# Patient Record
Sex: MALE | Race: ASIAN | NOT HISPANIC OR LATINO
[De-identification: names, ages, dates, MRNs, and addresses within clinical notes are randomized per-mention and may not be internally consistent; named-entity substitution may affect disease eponyms.]

---

## 2018-03-16 ENCOUNTER — TRANSCRIPTION ENCOUNTER (OUTPATIENT)
Age: 68
End: 2018-03-16

## 2018-04-13 ENCOUNTER — APPOINTMENT (OUTPATIENT)
Dept: RADIOLOGY | Facility: IMAGING CENTER | Age: 68
End: 2018-04-13
Payer: MEDICARE

## 2018-04-13 ENCOUNTER — OUTPATIENT (OUTPATIENT)
Dept: OUTPATIENT SERVICES | Facility: HOSPITAL | Age: 68
LOS: 1 days | End: 2018-04-13
Payer: MEDICARE

## 2018-04-13 DIAGNOSIS — Z00.8 ENCOUNTER FOR OTHER GENERAL EXAMINATION: ICD-10-CM

## 2018-04-13 PROCEDURE — 71046 X-RAY EXAM CHEST 2 VIEWS: CPT | Mod: 26

## 2018-04-13 PROCEDURE — 71046 X-RAY EXAM CHEST 2 VIEWS: CPT

## 2018-08-23 ENCOUNTER — APPOINTMENT (OUTPATIENT)
Dept: ORTHOPEDIC SURGERY | Facility: CLINIC | Age: 68
End: 2018-08-23
Payer: MEDICARE

## 2018-08-23 DIAGNOSIS — Z78.9 OTHER SPECIFIED HEALTH STATUS: ICD-10-CM

## 2018-08-23 DIAGNOSIS — S80.12XA CONTUSION OF LEFT LOWER LEG, INITIAL ENCOUNTER: ICD-10-CM

## 2018-08-23 PROCEDURE — 99214 OFFICE O/P EST MOD 30 MIN: CPT

## 2018-08-23 PROCEDURE — 73562 X-RAY EXAM OF KNEE 3: CPT | Mod: LT

## 2018-08-23 PROCEDURE — 73560 X-RAY EXAM OF KNEE 1 OR 2: CPT | Mod: RT

## 2018-10-08 ENCOUNTER — OUTPATIENT (OUTPATIENT)
Dept: OUTPATIENT SERVICES | Facility: HOSPITAL | Age: 68
LOS: 1 days | End: 2018-10-08
Payer: MEDICARE

## 2018-10-08 VITALS
OXYGEN SATURATION: 98 % | HEIGHT: 62 IN | DIASTOLIC BLOOD PRESSURE: 84 MMHG | TEMPERATURE: 98 F | WEIGHT: 180.78 LBS | SYSTOLIC BLOOD PRESSURE: 140 MMHG | RESPIRATION RATE: 16 BRPM | HEART RATE: 78 BPM

## 2018-10-08 DIAGNOSIS — M25.562 PAIN IN LEFT KNEE: ICD-10-CM

## 2018-10-08 DIAGNOSIS — Z96.652 PRESENCE OF LEFT ARTIFICIAL KNEE JOINT: Chronic | ICD-10-CM

## 2018-10-08 DIAGNOSIS — M17.12 UNILATERAL PRIMARY OSTEOARTHRITIS, LEFT KNEE: ICD-10-CM

## 2018-10-08 DIAGNOSIS — Z01.818 ENCOUNTER FOR OTHER PREPROCEDURAL EXAMINATION: ICD-10-CM

## 2018-10-08 LAB
ALBUMIN SERPL ELPH-MCNC: 4.4 G/DL — SIGNIFICANT CHANGE UP (ref 3.3–5)
ALP SERPL-CCNC: 69 U/L — SIGNIFICANT CHANGE UP (ref 30–120)
ALT FLD-CCNC: 22 U/L DA — SIGNIFICANT CHANGE UP (ref 10–60)
ANION GAP SERPL CALC-SCNC: 2 MMOL/L — LOW (ref 5–17)
APTT BLD: 34.7 SEC — SIGNIFICANT CHANGE UP (ref 27.5–37.4)
AST SERPL-CCNC: 17 U/L — SIGNIFICANT CHANGE UP (ref 10–40)
BILIRUB SERPL-MCNC: 0.4 MG/DL — SIGNIFICANT CHANGE UP (ref 0.2–1.2)
BLD GP AB SCN SERPL QL: SIGNIFICANT CHANGE UP
BUN SERPL-MCNC: 18 MG/DL — SIGNIFICANT CHANGE UP (ref 7–23)
CALCIUM SERPL-MCNC: 10.1 MG/DL — SIGNIFICANT CHANGE UP (ref 8.4–10.5)
CHLORIDE SERPL-SCNC: 103 MMOL/L — SIGNIFICANT CHANGE UP (ref 96–108)
CO2 SERPL-SCNC: 34 MMOL/L — HIGH (ref 22–31)
CREAT SERPL-MCNC: 1.02 MG/DL — SIGNIFICANT CHANGE UP (ref 0.5–1.3)
GLUCOSE SERPL-MCNC: 122 MG/DL — HIGH (ref 70–99)
HCT VFR BLD CALC: 46.1 % — SIGNIFICANT CHANGE UP (ref 39–50)
HGB BLD-MCNC: 15 G/DL — SIGNIFICANT CHANGE UP (ref 13–17)
INR BLD: 0.97 RATIO — SIGNIFICANT CHANGE UP (ref 0.88–1.16)
MCHC RBC-ENTMCNC: 27.3 PG — SIGNIFICANT CHANGE UP (ref 27–34)
MCHC RBC-ENTMCNC: 32.5 GM/DL — SIGNIFICANT CHANGE UP (ref 32–36)
MCV RBC AUTO: 84 FL — SIGNIFICANT CHANGE UP (ref 80–100)
MRSA PCR RESULT.: SIGNIFICANT CHANGE UP
NRBC # BLD: 0 /100 WBCS — SIGNIFICANT CHANGE UP (ref 0–0)
PLATELET # BLD AUTO: 334 K/UL — SIGNIFICANT CHANGE UP (ref 150–400)
POTASSIUM SERPL-MCNC: 4.3 MMOL/L — SIGNIFICANT CHANGE UP (ref 3.5–5.3)
POTASSIUM SERPL-SCNC: 4.3 MMOL/L — SIGNIFICANT CHANGE UP (ref 3.5–5.3)
PROT SERPL-MCNC: 7.9 G/DL — SIGNIFICANT CHANGE UP (ref 6–8.3)
PROTHROM AB SERPL-ACNC: 10.6 SEC — SIGNIFICANT CHANGE UP (ref 9.8–12.7)
RBC # BLD: 5.49 M/UL — SIGNIFICANT CHANGE UP (ref 4.2–5.8)
RBC # FLD: 13.9 % — SIGNIFICANT CHANGE UP (ref 10.3–14.5)
S AUREUS DNA NOSE QL NAA+PROBE: SIGNIFICANT CHANGE UP
SODIUM SERPL-SCNC: 139 MMOL/L — SIGNIFICANT CHANGE UP (ref 135–145)
WBC # BLD: 6.1 K/UL — SIGNIFICANT CHANGE UP (ref 3.8–10.5)
WBC # FLD AUTO: 6.1 K/UL — SIGNIFICANT CHANGE UP (ref 3.8–10.5)

## 2018-10-08 PROCEDURE — 80053 COMPREHEN METABOLIC PANEL: CPT

## 2018-10-08 PROCEDURE — 93005 ELECTROCARDIOGRAM TRACING: CPT

## 2018-10-08 PROCEDURE — 85730 THROMBOPLASTIN TIME PARTIAL: CPT

## 2018-10-08 PROCEDURE — 86900 BLOOD TYPING SEROLOGIC ABO: CPT

## 2018-10-08 PROCEDURE — 93010 ELECTROCARDIOGRAM REPORT: CPT

## 2018-10-08 PROCEDURE — 87640 STAPH A DNA AMP PROBE: CPT

## 2018-10-08 PROCEDURE — 86850 RBC ANTIBODY SCREEN: CPT

## 2018-10-08 PROCEDURE — G0463: CPT

## 2018-10-08 PROCEDURE — 85027 COMPLETE CBC AUTOMATED: CPT

## 2018-10-08 PROCEDURE — 85610 PROTHROMBIN TIME: CPT

## 2018-10-08 PROCEDURE — 86901 BLOOD TYPING SEROLOGIC RH(D): CPT

## 2018-10-08 PROCEDURE — 36415 COLL VENOUS BLD VENIPUNCTURE: CPT

## 2018-10-08 NOTE — H&P PST ADULT - PMH
BPH (benign prostatic hyperplasia)    Hyperlipidemia    Hypothyroid    Sleep apnea  nasal mask- BPH (benign prostatic hyperplasia)    Chronic pain of left knee    Hyperlipidemia    Hypothyroid    Sleep apnea  nasal mask-

## 2018-10-08 NOTE — H&P PST ADULT - ASSESSMENT
Patient presents to PST.  Pre op instructions reviewed and understood.  Medical clearance apt to be scheduled.

## 2018-10-08 NOTE — H&P PST ADULT - HISTORY OF PRESENT ILLNESS
66 yo male presents with 68 yo male presents with over 1 year history of left knee pain.  Pain increases with extension and ambulalation.  Mild relief with aleve. 66 yo male presents with over 1 year history of left knee pain.  Pain increases with extension and ambulalation.  Mild relief with aleve, which patient will stop 1 week pre op. 66 yo male presents with over 1 year history of left knee pain.  Pain increases with extension and ambulation.  Mild relief with aleve, which patient will stop 1 week pre op.

## 2018-10-08 NOTE — H&P PST ADULT - FAMILY HISTORY
Father  Still living? No  Family history of heart attack, Age at diagnosis: Age Unknown  Family history of stroke, Age at diagnosis: Age Unknown     Mother  Still living? Yes, Estimated age: 81-90  Family history of osteoarthritis, Age at diagnosis: Age Unknown

## 2018-10-11 ENCOUNTER — APPOINTMENT (OUTPATIENT)
Dept: ORTHOPEDIC SURGERY | Facility: CLINIC | Age: 68
End: 2018-10-11
Payer: MEDICARE

## 2018-10-11 VITALS — WEIGHT: 162 LBS | HEIGHT: 63 IN | BODY MASS INDEX: 28.7 KG/M2

## 2018-10-11 PROCEDURE — 99214 OFFICE O/P EST MOD 30 MIN: CPT

## 2018-10-11 PROCEDURE — 73562 X-RAY EXAM OF KNEE 3: CPT | Mod: LT

## 2018-10-19 RX ORDER — MAGNESIUM HYDROXIDE 400 MG/1
30 TABLET, CHEWABLE ORAL DAILY
Qty: 0 | Refills: 0 | Status: DISCONTINUED | OUTPATIENT
Start: 2018-10-23 | End: 2018-10-26

## 2018-10-19 RX ORDER — POLYETHYLENE GLYCOL 3350 17 G/17G
17 POWDER, FOR SOLUTION ORAL DAILY
Qty: 0 | Refills: 0 | Status: DISCONTINUED | OUTPATIENT
Start: 2018-10-23 | End: 2018-10-26

## 2018-10-19 RX ORDER — SENNA PLUS 8.6 MG/1
2 TABLET ORAL AT BEDTIME
Qty: 0 | Refills: 0 | Status: DISCONTINUED | OUTPATIENT
Start: 2018-10-23 | End: 2018-10-26

## 2018-10-19 RX ORDER — DOCUSATE SODIUM 100 MG
100 CAPSULE ORAL THREE TIMES A DAY
Qty: 0 | Refills: 0 | Status: DISCONTINUED | OUTPATIENT
Start: 2018-10-23 | End: 2018-10-26

## 2018-10-19 RX ORDER — SODIUM CHLORIDE 9 MG/ML
1000 INJECTION, SOLUTION INTRAVENOUS
Qty: 0 | Refills: 0 | Status: DISCONTINUED | OUTPATIENT
Start: 2018-10-23 | End: 2018-10-25

## 2018-10-19 RX ORDER — ONDANSETRON 8 MG/1
4 TABLET, FILM COATED ORAL EVERY 6 HOURS
Qty: 0 | Refills: 0 | Status: DISCONTINUED | OUTPATIENT
Start: 2018-10-23 | End: 2018-10-26

## 2018-10-19 RX ORDER — PANTOPRAZOLE SODIUM 20 MG/1
40 TABLET, DELAYED RELEASE ORAL
Qty: 0 | Refills: 0 | Status: DISCONTINUED | OUTPATIENT
Start: 2018-10-23 | End: 2018-10-26

## 2018-10-22 ENCOUNTER — TRANSCRIPTION ENCOUNTER (OUTPATIENT)
Age: 68
End: 2018-10-22

## 2018-10-23 ENCOUNTER — RESULT REVIEW (OUTPATIENT)
Age: 68
End: 2018-10-23

## 2018-10-23 ENCOUNTER — INPATIENT (INPATIENT)
Facility: HOSPITAL | Age: 68
LOS: 2 days | Discharge: HOME | DRG: 470 | End: 2018-10-26
Attending: SPECIALIST | Admitting: SPECIALIST
Payer: MEDICARE

## 2018-10-23 ENCOUNTER — APPOINTMENT (OUTPATIENT)
Dept: ORTHOPEDIC SURGERY | Facility: HOSPITAL | Age: 68
End: 2018-10-23

## 2018-10-23 VITALS
TEMPERATURE: 98 F | HEART RATE: 84 BPM | DIASTOLIC BLOOD PRESSURE: 85 MMHG | RESPIRATION RATE: 21 BRPM | HEIGHT: 63 IN | OXYGEN SATURATION: 100 % | SYSTOLIC BLOOD PRESSURE: 151 MMHG | WEIGHT: 174.39 LBS

## 2018-10-23 DIAGNOSIS — N40.0 BENIGN PROSTATIC HYPERPLASIA WITHOUT LOWER URINARY TRACT SYMPTOMS: ICD-10-CM

## 2018-10-23 DIAGNOSIS — E03.9 HYPOTHYROIDISM, UNSPECIFIED: ICD-10-CM

## 2018-10-23 DIAGNOSIS — M17.12 UNILATERAL PRIMARY OSTEOARTHRITIS, LEFT KNEE: ICD-10-CM

## 2018-10-23 DIAGNOSIS — Z01.818 ENCOUNTER FOR OTHER PREPROCEDURAL EXAMINATION: ICD-10-CM

## 2018-10-23 DIAGNOSIS — Z96.652 PRESENCE OF LEFT ARTIFICIAL KNEE JOINT: Chronic | ICD-10-CM

## 2018-10-23 DIAGNOSIS — E78.5 HYPERLIPIDEMIA, UNSPECIFIED: ICD-10-CM

## 2018-10-23 DIAGNOSIS — G47.30 SLEEP APNEA, UNSPECIFIED: ICD-10-CM

## 2018-10-23 DIAGNOSIS — M25.562 PAIN IN LEFT KNEE: ICD-10-CM

## 2018-10-23 LAB
ANION GAP SERPL CALC-SCNC: 6 MMOL/L — SIGNIFICANT CHANGE UP (ref 5–17)
BUN SERPL-MCNC: 15 MG/DL — SIGNIFICANT CHANGE UP (ref 7–23)
CALCIUM SERPL-MCNC: 8.7 MG/DL — SIGNIFICANT CHANGE UP (ref 8.4–10.5)
CHLORIDE SERPL-SCNC: 101 MMOL/L — SIGNIFICANT CHANGE UP (ref 96–108)
CO2 SERPL-SCNC: 27 MMOL/L — SIGNIFICANT CHANGE UP (ref 22–31)
CREAT SERPL-MCNC: 1.2 MG/DL — SIGNIFICANT CHANGE UP (ref 0.5–1.3)
GLUCOSE SERPL-MCNC: 217 MG/DL — HIGH (ref 70–99)
HCT VFR BLD CALC: 42.8 % — SIGNIFICANT CHANGE UP (ref 39–50)
HGB BLD-MCNC: 13.9 G/DL — SIGNIFICANT CHANGE UP (ref 13–17)
POTASSIUM SERPL-MCNC: 4.2 MMOL/L — SIGNIFICANT CHANGE UP (ref 3.5–5.3)
POTASSIUM SERPL-SCNC: 4.2 MMOL/L — SIGNIFICANT CHANGE UP (ref 3.5–5.3)
SODIUM SERPL-SCNC: 134 MMOL/L — LOW (ref 135–145)

## 2018-10-23 PROCEDURE — 88305 TISSUE EXAM BY PATHOLOGIST: CPT | Mod: 26

## 2018-10-23 PROCEDURE — 99222 1ST HOSP IP/OBS MODERATE 55: CPT

## 2018-10-23 PROCEDURE — 88311 DECALCIFY TISSUE: CPT | Mod: 26

## 2018-10-23 PROCEDURE — 27447 TOTAL KNEE ARTHROPLASTY: CPT | Mod: AS,LT

## 2018-10-23 PROCEDURE — 27447 TOTAL KNEE ARTHROPLASTY: CPT | Mod: LT

## 2018-10-23 RX ORDER — TAMSULOSIN HYDROCHLORIDE 0.4 MG/1
0.4 CAPSULE ORAL AT BEDTIME
Qty: 0 | Refills: 0 | Status: DISCONTINUED | OUTPATIENT
Start: 2018-10-23 | End: 2018-10-26

## 2018-10-23 RX ORDER — OXYCODONE HYDROCHLORIDE 5 MG/1
10 TABLET ORAL
Qty: 0 | Refills: 0 | Status: DISCONTINUED | OUTPATIENT
Start: 2018-10-23 | End: 2018-10-26

## 2018-10-23 RX ORDER — SODIUM CHLORIDE 9 MG/ML
1000 INJECTION, SOLUTION INTRAVENOUS
Qty: 0 | Refills: 0 | Status: DISCONTINUED | OUTPATIENT
Start: 2018-10-23 | End: 2018-10-23

## 2018-10-23 RX ORDER — APREPITANT 80 MG/1
40 CAPSULE ORAL ONCE
Qty: 0 | Refills: 0 | Status: COMPLETED | OUTPATIENT
Start: 2018-10-23 | End: 2018-10-23

## 2018-10-23 RX ORDER — CHLORHEXIDINE GLUCONATE 213 G/1000ML
1 SOLUTION TOPICAL ONCE
Qty: 0 | Refills: 0 | Status: COMPLETED | OUTPATIENT
Start: 2018-10-23 | End: 2018-10-23

## 2018-10-23 RX ORDER — ACETAMINOPHEN 500 MG
1000 TABLET ORAL ONCE
Qty: 0 | Refills: 0 | Status: COMPLETED | OUTPATIENT
Start: 2018-10-23 | End: 2018-10-23

## 2018-10-23 RX ORDER — ONDANSETRON 8 MG/1
4 TABLET, FILM COATED ORAL ONCE
Qty: 0 | Refills: 0 | Status: DISCONTINUED | OUTPATIENT
Start: 2018-10-23 | End: 2018-10-23

## 2018-10-23 RX ORDER — CEFAZOLIN SODIUM 1 G
2000 VIAL (EA) INJECTION EVERY 8 HOURS
Qty: 0 | Refills: 0 | Status: COMPLETED | OUTPATIENT
Start: 2018-10-23 | End: 2018-10-24

## 2018-10-23 RX ORDER — OXYCODONE HYDROCHLORIDE 5 MG/1
5 TABLET ORAL
Qty: 0 | Refills: 0 | Status: DISCONTINUED | OUTPATIENT
Start: 2018-10-23 | End: 2018-10-26

## 2018-10-23 RX ORDER — HYDROMORPHONE HYDROCHLORIDE 2 MG/ML
0.5 INJECTION INTRAMUSCULAR; INTRAVENOUS; SUBCUTANEOUS
Qty: 0 | Refills: 0 | Status: DISCONTINUED | OUTPATIENT
Start: 2018-10-23 | End: 2018-10-26

## 2018-10-23 RX ORDER — LEVOTHYROXINE SODIUM 125 MCG
50 TABLET ORAL DAILY
Qty: 0 | Refills: 0 | Status: DISCONTINUED | OUTPATIENT
Start: 2018-10-23 | End: 2018-10-26

## 2018-10-23 RX ORDER — SIMVASTATIN 20 MG/1
10 TABLET, FILM COATED ORAL AT BEDTIME
Qty: 0 | Refills: 0 | Status: DISCONTINUED | OUTPATIENT
Start: 2018-10-23 | End: 2018-10-26

## 2018-10-23 RX ORDER — CEFAZOLIN SODIUM 1 G
2000 VIAL (EA) INJECTION ONCE
Qty: 0 | Refills: 0 | Status: COMPLETED | OUTPATIENT
Start: 2018-10-23 | End: 2018-10-23

## 2018-10-23 RX ORDER — ASPIRIN/CALCIUM CARB/MAGNESIUM 324 MG
81 TABLET ORAL
Qty: 0 | Refills: 0 | Status: DISCONTINUED | OUTPATIENT
Start: 2018-10-24 | End: 2018-10-26

## 2018-10-23 RX ORDER — TRANEXAMIC ACID 100 MG/ML
1000 INJECTION, SOLUTION INTRAVENOUS ONCE
Qty: 0 | Refills: 0 | Status: COMPLETED | OUTPATIENT
Start: 2018-10-23 | End: 2018-10-23

## 2018-10-23 RX ORDER — ACETAMINOPHEN 500 MG
1000 TABLET ORAL EVERY 6 HOURS
Qty: 0 | Refills: 0 | Status: COMPLETED | OUTPATIENT
Start: 2018-10-23 | End: 2018-10-24

## 2018-10-23 RX ORDER — CELECOXIB 200 MG/1
200 CAPSULE ORAL
Qty: 0 | Refills: 0 | Status: DISCONTINUED | OUTPATIENT
Start: 2018-10-23 | End: 2018-10-26

## 2018-10-23 RX ORDER — INFLUENZA VIRUS VACCINE 15; 15; 15; 15 UG/.5ML; UG/.5ML; UG/.5ML; UG/.5ML
0.5 SUSPENSION INTRAMUSCULAR ONCE
Qty: 0 | Refills: 0 | Status: COMPLETED | OUTPATIENT
Start: 2018-10-23 | End: 2018-10-26

## 2018-10-23 RX ORDER — HYDROMORPHONE HYDROCHLORIDE 2 MG/ML
0.5 INJECTION INTRAMUSCULAR; INTRAVENOUS; SUBCUTANEOUS
Qty: 0 | Refills: 0 | Status: DISCONTINUED | OUTPATIENT
Start: 2018-10-23 | End: 2018-10-23

## 2018-10-23 RX ORDER — ACETAMINOPHEN 500 MG
1000 TABLET ORAL EVERY 8 HOURS
Qty: 0 | Refills: 0 | Status: DISCONTINUED | OUTPATIENT
Start: 2018-10-24 | End: 2018-10-26

## 2018-10-23 RX ADMIN — SODIUM CHLORIDE 75 MILLILITER(S): 9 INJECTION, SOLUTION INTRAVENOUS at 13:48

## 2018-10-23 RX ADMIN — APREPITANT 40 MILLIGRAM(S): 80 CAPSULE ORAL at 13:48

## 2018-10-23 RX ADMIN — HYDROMORPHONE HYDROCHLORIDE 0.5 MILLIGRAM(S): 2 INJECTION INTRAMUSCULAR; INTRAVENOUS; SUBCUTANEOUS at 18:10

## 2018-10-23 RX ADMIN — HYDROMORPHONE HYDROCHLORIDE 0.5 MILLIGRAM(S): 2 INJECTION INTRAMUSCULAR; INTRAVENOUS; SUBCUTANEOUS at 18:30

## 2018-10-23 RX ADMIN — HYDROMORPHONE HYDROCHLORIDE 0.5 MILLIGRAM(S): 2 INJECTION INTRAMUSCULAR; INTRAVENOUS; SUBCUTANEOUS at 17:55

## 2018-10-23 RX ADMIN — HYDROMORPHONE HYDROCHLORIDE 0.5 MILLIGRAM(S): 2 INJECTION INTRAMUSCULAR; INTRAVENOUS; SUBCUTANEOUS at 18:09

## 2018-10-23 RX ADMIN — Medication 1000 MILLIGRAM(S): at 23:03

## 2018-10-23 RX ADMIN — SODIUM CHLORIDE 100 MILLILITER(S): 9 INJECTION, SOLUTION INTRAVENOUS at 18:09

## 2018-10-23 RX ADMIN — HYDROMORPHONE HYDROCHLORIDE 0.5 MILLIGRAM(S): 2 INJECTION INTRAMUSCULAR; INTRAVENOUS; SUBCUTANEOUS at 19:45

## 2018-10-23 RX ADMIN — Medication 400 MILLIGRAM(S): at 22:59

## 2018-10-23 RX ADMIN — Medication 100 MILLIGRAM(S): at 22:59

## 2018-10-23 RX ADMIN — CHLORHEXIDINE GLUCONATE 1 APPLICATION(S): 213 SOLUTION TOPICAL at 13:48

## 2018-10-23 RX ADMIN — HYDROMORPHONE HYDROCHLORIDE 0.5 MILLIGRAM(S): 2 INJECTION INTRAMUSCULAR; INTRAVENOUS; SUBCUTANEOUS at 22:59

## 2018-10-23 NOTE — CONSULT NOTE ADULT - SUBJECTIVE AND OBJECTIVE BOX
Date/Time Patient Seen:  		  Referring MD:   Data Reviewed	       Patient is a 67y old  Male who presents with a chief complaint of pain left knee (23 Oct 2018 17:59)      Subjective/HPI    in bed  seen and examined  vs and meds reviewed  awake  verbal  medical records reviewed  uses CPAP for GILSON  post op today    H and P reviewed      H&P PST Adult [Charted Location: [facility]-Pre-Surgical Testing] [Authored: 08-Oct-2018 11:45]- for Visit: [id number], Complete, Revised, Signed in Full, General       General:  ·  Admission Date: 08-Oct-2018.   ·  Arrived From home.  ·  Source of Information patient.    Care Providers:   Provider Role	Provider Name	Occupation  · Attending	Quincy Brasher MD     Other Care Providers:  · Primary Care Provider	Dr Julian  [phone]    Chief Complaint/Reason for Visit/HPI:    Reason for Admission:  Reason for Admission	" My left knee is painful."     History of Present Illness:  History of Present Illness	  66 yo male presents with over 1 year history of left knee pain.  Pain increases with extension and ambulation.  Mild relief with aleve, which patient will stop 1 week pre op.     PAST MEDICAL & SURGICAL HISTORY:  Chronic pain of left knee  Sleep apnea: nasal mask-  BPH (benign prostatic hyperplasia)  Hyperlipidemia  Hypothyroid  Status post left knee replacement: 2016  No significant past surgical history        Medication list         MEDICATIONS  (STANDING):  influenza   Vaccine 0.5 milliLiter(s) IntraMuscular once  lactated ringers. 1000 milliLiter(s) (100 mL/Hr) IV Continuous <Continuous>    MEDICATIONS  (PRN):  HYDROmorphone  Injectable 0.5 milliGRAM(s) IV Push every 10 minutes PRN Moderate Pain (4 - 6)  ondansetron Injectable 4 milliGRAM(s) IV Push once PRN Nausea and/or Vomiting         Vitals log        ICU Vital Signs Last 24 Hrs  T(C): 36.8 (23 Oct 2018 17:45), Max: 36.8 (23 Oct 2018 13:19)  T(F): 98.2 (23 Oct 2018 17:45), Max: 98.3 (23 Oct 2018 13:19)  HR: 90 (23 Oct 2018 18:28) (72 - 90)  BP: 154/80 (23 Oct 2018 18:15) (137/66 - 159/82)  BP(mean): --  ABP: --  ABP(mean): --  RR: 15 (23 Oct 2018 18:15) (14 - 21)  SpO2: 100% (23 Oct 2018 18:28) (95% - 100%)     non smoker  non drinker    lives at home        Input and Output:  I&O's Detail    23 Oct 2018 07:01  -  23 Oct 2018 18:52  --------------------------------------------------------  IN:    lactated ringers.: 1400 mL  Total IN: 1400 mL    OUT:    Intermittent Catheterization - Urethral: 50 mL  Total OUT: 50 mL    Total NET: 1350 mL          Lab Data                  Review of Systems	      Objective     Physical Examination    obese  head at  heart s1s2  lung dec BS  abd soft  cn grossly int      Pertinent Lab findings & Imaging      Irene:  NO   Adequate UO     I&O's Detail    23 Oct 2018 07:01  -  23 Oct 2018 18:52  --------------------------------------------------------  IN:    lactated ringers.: 1400 mL  Total IN: 1400 mL    OUT:    Intermittent Catheterization - Urethral: 50 mL  Total OUT: 50 mL    Total NET: 1350 mL               Discussed with:     Cultures:	        Radiology
68 yo male with pmh of cad/hypothyroid/ bph/ GILSON on cpap , s/p left total knee replacement day 0    Allergies:        Allergies:  	No Known Allergies:     Home Medications:   * Incomplete Medication History as of 08-Oct-2018 12:07 documented in Structured Notes  · 	aspirin 81 mg oral tablet: Last Dose Taken:  , 1 tab(s) orally once a day  · 	L THYROXINE ROCHE: Last Dose Taken:  , 50 microgram(s) orally once a day  · 	simvastatin 10 mg oral tablet: Last Dose Taken:  , 1 tab(s) orally once a day (at bedtime)  · 	tamsulosin 0.4 mg oral capsule: Last Dose Taken:  , 1 cap(s) orally once a day  · 	multivitamin with minerals: Last Dose Taken:  , 1 tab(s) orally once a day    .    PMH/PSH/FH/SH:    Past Medical History:  BPH (benign prostatic hyperplasia)    Chronic pain of left knee    Hyperlipidemia    Hypothyroid    Sleep apnea  nasal mask-.     Past Surgical History:  Status post left knee replacement  2016.     Family History:  Father  Still living? No  Family history of heart attack, Age at diagnosis: Age Unknown  Family history of stroke, Age at diagnosis: Age Unknown     Mother  Still living? Yes, Estimated age: 81-90  Family history of osteoarthritis, Age at diagnosis: Age Unknown.     Social History:  · Marital Status		  · Lives With	spouse	      Review of Systems:   · General	negative	  · Skin/Breast	negative	  · Ophthalmologic	negative	  · ENMT	negative	  · Respiratory and Thorax	negative	  · Cardiovascular	negative	  · Gastrointestinal	negative	  · Genitourinary	negative	  · Musculoskeletal Symptoms	arthritis; joint pain; stiffness; leg pain L	  · Neurological	negative	  · Psychiatric	negative	  · Hematology/Lymphatics	negative	  · Endocrine	negative	  · Allergic/Immunologic	negative	       Physical Exam:  · Constitutional	Well-developed, well nourished	  · Eyes	EOMI; PERRL; no drainage or redness	  · ENMT	No oral lesions; no gross abnormalities	  · Neck	No bruits; no thyromegaly or nodules	  · Breasts	not examined	  · Back	No deformity or limitation of movement	  · Respiratory	detailed exam	  · Respiratory Details	airway patent; breath sounds equal; good air movement	  · Cardiovascular	detailed exam	  · Cardiovascular Details	regular rate and rhythm  no murmur	  · Gastrointestinal	Soft, non-tender, no hepatosplenomegaly, normal bowel sounds	  · Genitourinary	not examined	  · Rectal	not examined	  · Extremities	detailed exam	  · Extremities Details	no pedal edema	  · Vascular	detailed exam	  · DP Pulse	right normal; left normal	  · Neurological	Alert & oriented; no sensory, motor or coordination deficits, normal reflexes	  · Skin	No lesions; no rash	  · Lymph Nodes	not examined	  · Musculoskeletal	detailed exam	  · Musculoskeletal Details	no calf tenderness; decreased ROM due to pain; diminished strength	  · Factors that Aggravate Pain	activity	  · Factors that Relieve Pain	medications	    Functional Assessment:    Function Level Prior:  · Ambulation	0 = independent	  · Transferring	0 = independent	  · Toileting	0 = independent	  · Bathing	0 = independent	  · Dressing	0 = independent	  · Eating	0 = independent	  · Communication	0 = understands/communicates without difficulty	  · Swallowing	(0) swallows foods and liquids w/o difficulty

## 2018-10-23 NOTE — CONSULT NOTE ADULT - PROBLEM SELECTOR RECOMMENDATION 2
GILSON  obesity  cpap 12 cm water pressure at night time with nasal mask  sleep hygiene discussed  weight management discussed  keep sat > 88 pct  will follow and monitor  discussed with pt
tele monitoring  cpap

## 2018-10-23 NOTE — BRIEF OPERATIVE NOTE - PROCEDURE
<<-----Click on this checkbox to enter Procedure Left total knee arthroplasty  10/23/2018    Active  Kwadwo Prakash

## 2018-10-23 NOTE — CONSULT NOTE ADULT - PROBLEM SELECTOR RECOMMENDATION 9
post op   PT  ortho follow up  wound care  skin care  pain regimen and bowel regimen  caution with opioids  serial labs and serial PE  medical management of comorbidities
s/p left total knee replacement day 0  pt/ot  pain contrl  encourage ambulation, incentive spirometer

## 2018-10-24 ENCOUNTER — TRANSCRIPTION ENCOUNTER (OUTPATIENT)
Age: 68
End: 2018-10-24

## 2018-10-24 LAB
ANION GAP SERPL CALC-SCNC: 7 MMOL/L — SIGNIFICANT CHANGE UP (ref 5–17)
BUN SERPL-MCNC: 13 MG/DL — SIGNIFICANT CHANGE UP (ref 7–23)
CALCIUM SERPL-MCNC: 8.9 MG/DL — SIGNIFICANT CHANGE UP (ref 8.4–10.5)
CHLORIDE SERPL-SCNC: 100 MMOL/L — SIGNIFICANT CHANGE UP (ref 96–108)
CO2 SERPL-SCNC: 29 MMOL/L — SIGNIFICANT CHANGE UP (ref 22–31)
CREAT SERPL-MCNC: 1.02 MG/DL — SIGNIFICANT CHANGE UP (ref 0.5–1.3)
GLUCOSE SERPL-MCNC: 155 MG/DL — HIGH (ref 70–99)
HCT VFR BLD CALC: 40 % — SIGNIFICANT CHANGE UP (ref 39–50)
HGB BLD-MCNC: 13.2 G/DL — SIGNIFICANT CHANGE UP (ref 13–17)
MCHC RBC-ENTMCNC: 27.7 PG — SIGNIFICANT CHANGE UP (ref 27–34)
MCHC RBC-ENTMCNC: 33 GM/DL — SIGNIFICANT CHANGE UP (ref 32–36)
MCV RBC AUTO: 83.9 FL — SIGNIFICANT CHANGE UP (ref 80–100)
NRBC # BLD: 0 /100 WBCS — SIGNIFICANT CHANGE UP (ref 0–0)
PLATELET # BLD AUTO: 327 K/UL — SIGNIFICANT CHANGE UP (ref 150–400)
POTASSIUM SERPL-MCNC: 4.1 MMOL/L — SIGNIFICANT CHANGE UP (ref 3.5–5.3)
POTASSIUM SERPL-SCNC: 4.1 MMOL/L — SIGNIFICANT CHANGE UP (ref 3.5–5.3)
RBC # BLD: 4.77 M/UL — SIGNIFICANT CHANGE UP (ref 4.2–5.8)
RBC # FLD: 14.1 % — SIGNIFICANT CHANGE UP (ref 10.3–14.5)
SODIUM SERPL-SCNC: 136 MMOL/L — SIGNIFICANT CHANGE UP (ref 135–145)
WBC # BLD: 12.55 K/UL — HIGH (ref 3.8–10.5)
WBC # FLD AUTO: 12.55 K/UL — HIGH (ref 3.8–10.5)

## 2018-10-24 PROCEDURE — 73562 X-RAY EXAM OF KNEE 3: CPT | Mod: 26,LT

## 2018-10-24 PROCEDURE — 99232 SBSQ HOSP IP/OBS MODERATE 35: CPT

## 2018-10-24 RX ORDER — CELECOXIB 200 MG/1
1 CAPSULE ORAL
Qty: 0 | Refills: 0 | COMMUNITY
Start: 2018-10-24

## 2018-10-24 RX ORDER — ACETAMINOPHEN 500 MG
2 TABLET ORAL
Qty: 0 | Refills: 0 | DISCHARGE
Start: 2018-10-24 | End: 2018-11-06

## 2018-10-24 RX ORDER — DOCUSATE SODIUM 100 MG
1 CAPSULE ORAL
Qty: 0 | Refills: 0 | DISCHARGE
Start: 2018-10-24

## 2018-10-24 RX ORDER — POLYETHYLENE GLYCOL 3350 17 G/17G
17 POWDER, FOR SOLUTION ORAL
Qty: 0 | Refills: 0 | DISCHARGE
Start: 2018-10-24

## 2018-10-24 RX ORDER — PANTOPRAZOLE SODIUM 20 MG/1
1 TABLET, DELAYED RELEASE ORAL
Qty: 30 | Refills: 0
Start: 2018-10-24 | End: 2018-11-22

## 2018-10-24 RX ORDER — OXYCODONE HYDROCHLORIDE 5 MG/1
1 TABLET ORAL
Qty: 42 | Refills: 0
Start: 2018-10-24 | End: 2018-10-30

## 2018-10-24 RX ORDER — ASPIRIN/CALCIUM CARB/MAGNESIUM 324 MG
1 TABLET ORAL
Qty: 0 | Refills: 0 | COMMUNITY
Start: 2018-10-24

## 2018-10-24 RX ORDER — PANTOPRAZOLE SODIUM 20 MG/1
1 TABLET, DELAYED RELEASE ORAL
Qty: 0 | Refills: 0 | DISCHARGE
Start: 2018-10-24

## 2018-10-24 RX ORDER — ASPIRIN/CALCIUM CARB/MAGNESIUM 324 MG
1 TABLET ORAL
Qty: 0 | Refills: 0 | COMMUNITY

## 2018-10-24 RX ORDER — CELECOXIB 200 MG/1
1 CAPSULE ORAL
Qty: 60 | Refills: 0
Start: 2018-10-24 | End: 2018-11-22

## 2018-10-24 RX ORDER — ASPIRIN/CALCIUM CARB/MAGNESIUM 324 MG
1 TABLET ORAL
Qty: 84 | Refills: 0
Start: 2018-10-24 | End: 2018-12-04

## 2018-10-24 RX ORDER — SENNA PLUS 8.6 MG/1
2 TABLET ORAL
Qty: 0 | Refills: 0 | DISCHARGE
Start: 2018-10-24

## 2018-10-24 RX ADMIN — Medication 100 MILLIGRAM(S): at 07:16

## 2018-10-24 RX ADMIN — Medication 1000 MILLIGRAM(S): at 05:30

## 2018-10-24 RX ADMIN — Medication 81 MILLIGRAM(S): at 05:29

## 2018-10-24 RX ADMIN — Medication 1000 MILLIGRAM(S): at 10:50

## 2018-10-24 RX ADMIN — Medication 1000 MILLIGRAM(S): at 17:54

## 2018-10-24 RX ADMIN — Medication 81 MILLIGRAM(S): at 17:54

## 2018-10-24 RX ADMIN — CELECOXIB 200 MILLIGRAM(S): 200 CAPSULE ORAL at 09:07

## 2018-10-24 RX ADMIN — Medication 100 MILLIGRAM(S): at 05:29

## 2018-10-24 RX ADMIN — Medication 400 MILLIGRAM(S): at 10:34

## 2018-10-24 RX ADMIN — OXYCODONE HYDROCHLORIDE 5 MILLIGRAM(S): 5 TABLET ORAL at 21:11

## 2018-10-24 RX ADMIN — CELECOXIB 200 MILLIGRAM(S): 200 CAPSULE ORAL at 09:36

## 2018-10-24 RX ADMIN — TAMSULOSIN HYDROCHLORIDE 0.4 MILLIGRAM(S): 0.4 CAPSULE ORAL at 21:11

## 2018-10-24 RX ADMIN — OXYCODONE HYDROCHLORIDE 10 MILLIGRAM(S): 5 TABLET ORAL at 09:36

## 2018-10-24 RX ADMIN — OXYCODONE HYDROCHLORIDE 5 MILLIGRAM(S): 5 TABLET ORAL at 22:05

## 2018-10-24 RX ADMIN — HYDROMORPHONE HYDROCHLORIDE 0.5 MILLIGRAM(S): 2 INJECTION INTRAMUSCULAR; INTRAVENOUS; SUBCUTANEOUS at 00:01

## 2018-10-24 RX ADMIN — OXYCODONE HYDROCHLORIDE 10 MILLIGRAM(S): 5 TABLET ORAL at 09:07

## 2018-10-24 RX ADMIN — SIMVASTATIN 10 MILLIGRAM(S): 20 TABLET, FILM COATED ORAL at 21:12

## 2018-10-24 RX ADMIN — Medication 100 MILLIGRAM(S): at 21:11

## 2018-10-24 RX ADMIN — CELECOXIB 200 MILLIGRAM(S): 200 CAPSULE ORAL at 21:12

## 2018-10-24 RX ADMIN — Medication 1000 MILLIGRAM(S): at 18:16

## 2018-10-24 RX ADMIN — PANTOPRAZOLE SODIUM 40 MILLIGRAM(S): 20 TABLET, DELAYED RELEASE ORAL at 05:29

## 2018-10-24 RX ADMIN — Medication 400 MILLIGRAM(S): at 05:29

## 2018-10-24 RX ADMIN — Medication 50 MICROGRAM(S): at 05:29

## 2018-10-24 RX ADMIN — SENNA PLUS 2 TABLET(S): 8.6 TABLET ORAL at 21:11

## 2018-10-24 RX ADMIN — Medication 100 MILLIGRAM(S): at 13:21

## 2018-10-24 RX ADMIN — CELECOXIB 200 MILLIGRAM(S): 200 CAPSULE ORAL at 21:11

## 2018-10-24 NOTE — DISCHARGE NOTE ADULT - NS AS ACTIVITY OBS
Do not drive or operate machinery/No Heavy lifting/straining/Walking-Outdoors allowed Showering allowed/Walking-Indoors allowed/No Heavy lifting/straining/Walking-Outdoors allowed/Do not drive or operate machinery

## 2018-10-24 NOTE — DISCHARGE NOTE ADULT - ADDITIONAL INSTRUCTIONS
Follow up Dr. Brasher office call MD for appt  Call Primary Care MD following  discharge from facility Follow up Dr. Brasher office in two weeks, call the office for an appointment  Call Primary Care MD following  discharge from facility

## 2018-10-24 NOTE — DISCHARGE NOTE ADULT - PATIENT PORTAL LINK FT
You can access the [facility] Patient Portal, offered by [facility], by registering with the following website: [email or web address]

## 2018-10-24 NOTE — OCCUPATIONAL THERAPY INITIAL EVALUATION ADULT - PERTINENT HX OF CURRENT PROBLEM, REHAB EVAL
66 y/o male s/p Left TKR on 10/23/18 by Dr. Brasher. 68 y/o male s/p Left TKR on 10/23/18 by Dr. Brasher. Pt s/p Right TKR 2016.

## 2018-10-24 NOTE — DISCHARGE NOTE ADULT - CARE PLAN
Principal Discharge DX:	Total knee replacement status  Goal:	improve activities of daily living  Assessment and plan of treatment:	- Physical Therapy /Occupational Therapy Total Knee Protocol - Ambulation  - Stairs                                                                                                                  -  Transfers      - Activities of daily living  - ROM Goals: Ext = 0 drg.; Flex = 110-120 deg as tolerated  - Prineo tape/Suture removal Post Op Day # 14.  - Apply Ice packs to knee for 30 min. Q 3 hrsdaily.  CONTINUE   -  -Ecotrin 81mg BID x 6 wks more  Assessment and plan of treatment:	- Use CPM 2 hours 3 times a day  - Start daily @ 6AM.  - Settings: Extension = 0; Flexion now at  xx degrees  - Advance 5-10degrees each session as tolerated  to goal 120 degrees  - Consider Pain meds prior to CPM  -  Apply ice to knee Post CPM

## 2018-10-24 NOTE — DISCHARGE NOTE ADULT - PLAN OF CARE
improve activities of daily living - Physical Therapy /Occupational Therapy Total Knee Protocol - Ambulation  - Stairs                                                                                                                  -  Transfers      - Activities of daily living  - ROM Goals: Ext = 0 drg.; Flex = 110-120 deg as tolerated  - Prineo tape/Suture removal Post Op Day # 14.  - Apply Ice packs to knee for 30 min. Q 3 hrsdaily.  CONTINUE   -  -Ecotrin 81mg BID x 6 wks more - Use CPM 2 hours 3 times a day  - Start daily @ 6AM.  - Settings: Extension = 0; Flexion now at  xx degrees  - Advance 5-10degrees each session as tolerated  to goal 120 degrees  - Consider Pain meds prior to CPM  -  Apply ice to knee Post CPM

## 2018-10-24 NOTE — DISCHARGE NOTE ADULT - MEDICATION SUMMARY - MEDICATIONS TO TAKE
I will START or STAY ON the medications listed below when I get home from the hospital:    cpm  -- s/p total knee replacement   start at 0 - 50 degrees in crease 5- 10 degrees  each session as tolerated   2 hours 3 x a day as tolerated  -- Indication: For equipement    acetaminophen 500 mg oral tablet  -- 2 tab(s) by mouth every 8 hours  -- Indication: For as needed for pain    aspirin 81 mg oral delayed release tablet  -- 1 tab(s) by mouth 2 times a day MDD:2 for prevention of blod clots for  6 weeks  -- Indication: For To prevent blood clots    oxyCODONE 5 mg oral tablet  -- 1 tab(s) by mouth every 4 hours, As Needed -Mild Pain (1 - 3) MDD:6  -- Indication: For as needed for pain    celecoxib 200 mg oral capsule  -- 1 cap(s) by mouth 2 times a day MDD:2  -- Indication: For Pain and swelling    tamsulosin 0.4 mg oral capsule  -- 1 cap(s) by mouth once a day  -- Indication: For BPH (benign prostatic hyperplasia)    simvastatin 10 mg oral tablet  -- 1 tab(s) by mouth once a day (at bedtime)  -- Indication: For High cholesterol    docusate sodium 100 mg oral capsule  -- 1 cap(s) by mouth 3 times a day  -- Indication: For Constipation    senna oral tablet  -- 2 tab(s) by mouth once a day (at bedtime)  -- Indication: For Constipation    polyethylene glycol 3350 oral powder for reconstitution  -- 17 gram(s) by mouth once a day, As needed, Constipation  -- Indication: For Constipation    pantoprazole 40 mg oral delayed release tablet  -- 1 tab(s) by mouth   -- Indication: For reflux    pantoprazole 40 mg oral delayed release tablet  -- 1 tab(s) by mouth once a day MDD:1  -- Indication: For reflux    L THYROXINE ROCHE  -- 50 microgram(s) by mouth once a day  -- Indication: For Thyroid    multivitamin with minerals  -- 1 tab(s) by mouth once a day  -- Indication: For Suppliments

## 2018-10-24 NOTE — DISCHARGE NOTE ADULT - HOSPITAL COURSE
The patient is a ___________  y.o.  ________ who presents with  known severe osteoarthritis of the _________ with severe adverse impact into quality of mobility & daily activities, and having failed conservative treatment,  for planned elective _____________ by _____________ scheduled for ___________.  A pre-operative Orthopedic & Radiographic work-up was performed by the Surgeon. The patient received an admitting History & Physical exam performed in Pre Surgical Testing detailing her current clinical problem, past medical history including all medications, and clinical exam findings. Pre surgical testing, pre-anesthesia assessment, and a pre-operative medical evaluation were performed.  After admission on 10/23/18_ and receiving pre-operative parenteral prophylactic antibiotics, the patient  underwent an uneventful and uncomplicated Left total knee replacement by Dr. Brasher. After completion of surgery and emergence from anesthesia, the patient was transferred to the PACU for routine hemodynamic, airway, and pain monitoring. After a stable PACU coure, the patient was transferred to the surgical unit for acute post-operative care.  The patient had a stable and uncomplicated hospital course with no medical complications.  A course of post-op parenteral antibiotics was received to complete surgical prophylaxis. Pre-operative medications were continued in the post-op course and adjusted as medically needed. A medical consultation from the Hospitalist service was obtained for post-operative medical co-management. Typical Physical & occupational therapy modalities post total knee replacement were performed. Lab work were followed by the medical & Orthopedic team.   The patient had a clean appearing surgical incision with no sign of surgical site infections and had a stable neuro / vascular exam of the operated extremity.  After progression of mobility guided by the PT/ OT staff,  the patient was felt to benefit from further rehabilitative care for restoration to level of function. This was felt to best be accomplished in a home.  Discharge and Orthopedic Care instructions were delineated in the Discharge Plan and reviewed with the patient. All medications were delineated in the medication reconcilitaion tool and costa points were reviewed with the patient. They were deemed stable from an Orthopedic & medical standpoint for discharge today.  Upon ( discharge from the rehab facility ) or (completion of Home care ) they will be  following up with Dr. Brasher for office  follow up Orthopedic care. The patient is a 67  y.o. male_ who presents with  known severe osteoarthritis of the left knee  with severe adverse impact into quality of mobility & daily activities, and having failed conservative treatment,  for planned elective left total knee replacement_ by  scheduled for 10/23.  A pre-operative Orthopedic & Radiographic work-up was performed by the Surgeon. The patient received an admitting History & Physical exam performed in Pre Surgical Testing detailing her current clinical problem, past medical history including all medications, and clinical exam findings. Pre surgical testing, pre-anesthesia assessment, and a pre-operative medical evaluation were performed.  After admission on 10/23/18_ and receiving pre-operative parenteral prophylactic antibiotics, the patient  underwent an uneventful and uncomplicated Left total knee replacement by Dr. Brasher. After completion of surgery and emergence from anesthesia, the patient was transferred to the PACU for routine hemodynamic, airway, and pain monitoring. After a stable PACU coure, the patient was transferred to the surgical unit for acute post-operative care.  The patient had a stable and uncomplicated hospital course with no medical complications.  A course of post-op parenteral antibiotics was received to complete surgical prophylaxis. Pre-operative medications were continued in the post-op course and adjusted as medically needed. A medical consultation from the Hospitalist service was obtained for post-operative medical co-management. Typical Physical & occupational therapy modalities post total knee replacement were performed. Lab work were followed by the medical & Orthopedic team.   The patient had a clean appearing surgical incision with no sign of surgical site infections and had a stable neuro / vascular exam of the operated extremity.  After progression of mobility guided by the PT/ OT staff,  the patient was felt to benefit from further rehabilitative care for restoration to level of function. This was felt to best be accomplished in a home.  Discharge and Orthopedic Care instructions were delineated in the Discharge Plan and reviewed with the patient. All medications were delineated in the medication reconcilitaion tool and costa points were reviewed with the patient. They were deemed stable from an Orthopedic & medical standpoint for discharge today.  Upon ( discharge from the rehab facility ) or (completion of Home care ) they will be  following up with Dr. Brasher for office  follow up Orthopedic care.

## 2018-10-24 NOTE — DISCHARGE NOTE ADULT - CARE PROVIDER_API CALL
Quincy Brasher), Orthopaedic Surgery  [address] [place] [address]  Phone: [phone]  Fax: [phone]

## 2018-10-24 NOTE — OCCUPATIONAL THERAPY INITIAL EVALUATION ADULT - ADDITIONAL COMMENTS
Pt lives in a private house with 4STE no railing and steps inside the home. + Tub Yes Pt lives in a private house with 4STE no railing and no steps to access inside. Pt has a bathtub with sliding doors. Pt owns a rolling walker, cane and commode.

## 2018-10-25 LAB
ANION GAP SERPL CALC-SCNC: 9 MMOL/L — SIGNIFICANT CHANGE UP (ref 5–17)
BUN SERPL-MCNC: 24 MG/DL — HIGH (ref 7–23)
CALCIUM SERPL-MCNC: 8.8 MG/DL — SIGNIFICANT CHANGE UP (ref 8.4–10.5)
CHLORIDE SERPL-SCNC: 103 MMOL/L — SIGNIFICANT CHANGE UP (ref 96–108)
CO2 SERPL-SCNC: 26 MMOL/L — SIGNIFICANT CHANGE UP (ref 22–31)
CREAT SERPL-MCNC: 1.14 MG/DL — SIGNIFICANT CHANGE UP (ref 0.5–1.3)
GLUCOSE SERPL-MCNC: 203 MG/DL — HIGH (ref 70–99)
HCT VFR BLD CALC: 35.7 % — LOW (ref 39–50)
HGB BLD-MCNC: 11.6 G/DL — LOW (ref 13–17)
MCHC RBC-ENTMCNC: 27.5 PG — SIGNIFICANT CHANGE UP (ref 27–34)
MCHC RBC-ENTMCNC: 32.5 GM/DL — SIGNIFICANT CHANGE UP (ref 32–36)
MCV RBC AUTO: 84.6 FL — SIGNIFICANT CHANGE UP (ref 80–100)
NRBC # BLD: 0 /100 WBCS — SIGNIFICANT CHANGE UP (ref 0–0)
PLATELET # BLD AUTO: 299 K/UL — SIGNIFICANT CHANGE UP (ref 150–400)
POTASSIUM SERPL-MCNC: 4.1 MMOL/L — SIGNIFICANT CHANGE UP (ref 3.5–5.3)
POTASSIUM SERPL-SCNC: 4.1 MMOL/L — SIGNIFICANT CHANGE UP (ref 3.5–5.3)
RBC # BLD: 4.22 M/UL — SIGNIFICANT CHANGE UP (ref 4.2–5.8)
RBC # FLD: 14.4 % — SIGNIFICANT CHANGE UP (ref 10.3–14.5)
SODIUM SERPL-SCNC: 138 MMOL/L — SIGNIFICANT CHANGE UP (ref 135–145)
WBC # BLD: 8.52 K/UL — SIGNIFICANT CHANGE UP (ref 3.8–10.5)
WBC # FLD AUTO: 8.52 K/UL — SIGNIFICANT CHANGE UP (ref 3.8–10.5)

## 2018-10-25 PROCEDURE — 99232 SBSQ HOSP IP/OBS MODERATE 35: CPT

## 2018-10-25 RX ADMIN — SENNA PLUS 2 TABLET(S): 8.6 TABLET ORAL at 21:24

## 2018-10-25 RX ADMIN — Medication 50 MICROGRAM(S): at 05:20

## 2018-10-25 RX ADMIN — Medication 81 MILLIGRAM(S): at 17:52

## 2018-10-25 RX ADMIN — Medication 81 MILLIGRAM(S): at 05:20

## 2018-10-25 RX ADMIN — CELECOXIB 200 MILLIGRAM(S): 200 CAPSULE ORAL at 09:11

## 2018-10-25 RX ADMIN — Medication 1000 MILLIGRAM(S): at 05:20

## 2018-10-25 RX ADMIN — Medication 1000 MILLIGRAM(S): at 12:58

## 2018-10-25 RX ADMIN — CELECOXIB 200 MILLIGRAM(S): 200 CAPSULE ORAL at 09:12

## 2018-10-25 RX ADMIN — TAMSULOSIN HYDROCHLORIDE 0.4 MILLIGRAM(S): 0.4 CAPSULE ORAL at 21:26

## 2018-10-25 RX ADMIN — Medication 1000 MILLIGRAM(S): at 05:19

## 2018-10-25 RX ADMIN — OXYCODONE HYDROCHLORIDE 5 MILLIGRAM(S): 5 TABLET ORAL at 21:28

## 2018-10-25 RX ADMIN — CELECOXIB 200 MILLIGRAM(S): 200 CAPSULE ORAL at 21:24

## 2018-10-25 RX ADMIN — PANTOPRAZOLE SODIUM 40 MILLIGRAM(S): 20 TABLET, DELAYED RELEASE ORAL at 05:35

## 2018-10-25 RX ADMIN — OXYCODONE HYDROCHLORIDE 5 MILLIGRAM(S): 5 TABLET ORAL at 22:20

## 2018-10-25 RX ADMIN — Medication 100 MILLIGRAM(S): at 05:20

## 2018-10-25 RX ADMIN — OXYCODONE HYDROCHLORIDE 5 MILLIGRAM(S): 5 TABLET ORAL at 12:58

## 2018-10-25 RX ADMIN — SIMVASTATIN 10 MILLIGRAM(S): 20 TABLET, FILM COATED ORAL at 21:24

## 2018-10-25 RX ADMIN — OXYCODONE HYDROCHLORIDE 5 MILLIGRAM(S): 5 TABLET ORAL at 06:19

## 2018-10-25 RX ADMIN — OXYCODONE HYDROCHLORIDE 5 MILLIGRAM(S): 5 TABLET ORAL at 18:30

## 2018-10-25 RX ADMIN — OXYCODONE HYDROCHLORIDE 5 MILLIGRAM(S): 5 TABLET ORAL at 10:00

## 2018-10-25 RX ADMIN — Medication 1000 MILLIGRAM(S): at 22:14

## 2018-10-25 RX ADMIN — CELECOXIB 200 MILLIGRAM(S): 200 CAPSULE ORAL at 22:24

## 2018-10-25 RX ADMIN — OXYCODONE HYDROCHLORIDE 5 MILLIGRAM(S): 5 TABLET ORAL at 05:20

## 2018-10-25 RX ADMIN — OXYCODONE HYDROCHLORIDE 5 MILLIGRAM(S): 5 TABLET ORAL at 17:52

## 2018-10-25 RX ADMIN — Medication 100 MILLIGRAM(S): at 21:24

## 2018-10-25 RX ADMIN — OXYCODONE HYDROCHLORIDE 5 MILLIGRAM(S): 5 TABLET ORAL at 09:11

## 2018-10-25 RX ADMIN — OXYCODONE HYDROCHLORIDE 5 MILLIGRAM(S): 5 TABLET ORAL at 13:30

## 2018-10-25 RX ADMIN — Medication 1000 MILLIGRAM(S): at 21:25

## 2018-10-25 RX ADMIN — Medication 100 MILLIGRAM(S): at 12:57

## 2018-10-26 VITALS
RESPIRATION RATE: 16 BRPM | DIASTOLIC BLOOD PRESSURE: 72 MMHG | OXYGEN SATURATION: 98 % | HEART RATE: 94 BPM | TEMPERATURE: 98 F | SYSTOLIC BLOOD PRESSURE: 132 MMHG

## 2018-10-26 LAB
HCT VFR BLD CALC: 34.7 % — LOW (ref 39–50)
HGB BLD-MCNC: 11.2 G/DL — LOW (ref 13–17)
MCHC RBC-ENTMCNC: 27.5 PG — SIGNIFICANT CHANGE UP (ref 27–34)
MCHC RBC-ENTMCNC: 32.3 GM/DL — SIGNIFICANT CHANGE UP (ref 32–36)
MCV RBC AUTO: 85.3 FL — SIGNIFICANT CHANGE UP (ref 80–100)
NRBC # BLD: 0 /100 WBCS — SIGNIFICANT CHANGE UP (ref 0–0)
PLATELET # BLD AUTO: 278 K/UL — SIGNIFICANT CHANGE UP (ref 150–400)
RBC # BLD: 4.07 M/UL — LOW (ref 4.2–5.8)
RBC # FLD: 14.6 % — HIGH (ref 10.3–14.5)
WBC # BLD: 7.8 K/UL — SIGNIFICANT CHANGE UP (ref 3.8–10.5)
WBC # FLD AUTO: 7.8 K/UL — SIGNIFICANT CHANGE UP (ref 3.8–10.5)

## 2018-10-26 PROCEDURE — 99232 SBSQ HOSP IP/OBS MODERATE 35: CPT

## 2018-10-26 RX ADMIN — Medication 1000 MILLIGRAM(S): at 15:27

## 2018-10-26 RX ADMIN — INFLUENZA VIRUS VACCINE 0.5 MILLILITER(S): 15; 15; 15; 15 SUSPENSION INTRAMUSCULAR at 15:35

## 2018-10-26 RX ADMIN — CELECOXIB 200 MILLIGRAM(S): 200 CAPSULE ORAL at 09:01

## 2018-10-26 RX ADMIN — Medication 50 MICROGRAM(S): at 05:56

## 2018-10-26 RX ADMIN — Medication 81 MILLIGRAM(S): at 16:55

## 2018-10-26 RX ADMIN — PANTOPRAZOLE SODIUM 40 MILLIGRAM(S): 20 TABLET, DELAYED RELEASE ORAL at 06:00

## 2018-10-26 RX ADMIN — Medication 100 MILLIGRAM(S): at 05:56

## 2018-10-26 RX ADMIN — OXYCODONE HYDROCHLORIDE 5 MILLIGRAM(S): 5 TABLET ORAL at 09:31

## 2018-10-26 RX ADMIN — Medication 1000 MILLIGRAM(S): at 05:55

## 2018-10-26 RX ADMIN — Medication 100 MILLIGRAM(S): at 15:25

## 2018-10-26 RX ADMIN — POLYETHYLENE GLYCOL 3350 17 GRAM(S): 17 POWDER, FOR SOLUTION ORAL at 15:25

## 2018-10-26 RX ADMIN — Medication 1000 MILLIGRAM(S): at 15:25

## 2018-10-26 RX ADMIN — OXYCODONE HYDROCHLORIDE 5 MILLIGRAM(S): 5 TABLET ORAL at 09:01

## 2018-10-26 RX ADMIN — Medication 81 MILLIGRAM(S): at 05:56

## 2018-10-26 RX ADMIN — OXYCODONE HYDROCHLORIDE 5 MILLIGRAM(S): 5 TABLET ORAL at 06:50

## 2018-10-26 RX ADMIN — OXYCODONE HYDROCHLORIDE 5 MILLIGRAM(S): 5 TABLET ORAL at 05:58

## 2018-10-26 RX ADMIN — Medication 1000 MILLIGRAM(S): at 07:21

## 2018-10-26 NOTE — PROGRESS NOTE ADULT - ASSESSMENT
68 yo male with pmh of cad/hypothyroid/ bph/ GILSON on cpap , s/p left total knee replacement day 1
68 yo male with pmh of cad/hypothyroid/ bph/ GILSON on cpap , s/p left total knee replacement day 2
68 yo male with pmh of cad/hypothyroid/ bph/ GILSON on cpap , s/p left total knee replacement day 3

## 2018-10-26 NOTE — PROGRESS NOTE ADULT - SUBJECTIVE AND OBJECTIVE BOX
Date/Time Patient Seen:  		  Referring MD:   Data Reviewed	       Patient is a 67y old  Male who presents with a chief complaint of S/P Left TKR 10/23 (25 Oct 2018 10:28)    in bed  seen and examined  vs and meds reviewed    Subjective/HPI     PAST MEDICAL & SURGICAL HISTORY:  Chronic pain of left knee  Sleep apnea: nasal mask-  BPH (benign prostatic hyperplasia)  Hyperlipidemia  Hypothyroid  Status post left knee replacement: 2016  No significant past surgical history        Medication list         MEDICATIONS  (STANDING):  acetaminophen   Tablet .. 1000 milliGRAM(s) Oral every 8 hours  aspirin enteric coated 81 milliGRAM(s) Oral two times a day  celecoxib 200 milliGRAM(s) Oral two times a day  docusate sodium 100 milliGRAM(s) Oral three times a day  influenza   Vaccine 0.5 milliLiter(s) IntraMuscular once  levothyroxine 50 MICROGram(s) Oral daily  pantoprazole    Tablet 40 milliGRAM(s) Oral <User Schedule>  senna 2 Tablet(s) Oral at bedtime  simvastatin 10 milliGRAM(s) Oral at bedtime  tamsulosin 0.4 milliGRAM(s) Oral at bedtime    MEDICATIONS  (PRN):  aluminum hydroxide/magnesium hydroxide/simethicone Suspension 30 milliLiter(s) Oral four times a day PRN Indigestion  bisacodyl Suppository 10 milliGRAM(s) Rectal daily PRN If no bowel movement by postoperative day #2  HYDROmorphone  Injectable 0.5 milliGRAM(s) IV Push every 3 hours PRN Severe Pain (7 - 10)  magnesium hydroxide Suspension 30 milliLiter(s) Oral daily PRN Constipation  ondansetron Injectable 4 milliGRAM(s) IV Push every 6 hours PRN Nausea and/or Vomiting  oxyCODONE    IR 5 milliGRAM(s) Oral every 3 hours PRN Mild Pain (1 - 3)  oxyCODONE    IR 10 milliGRAM(s) Oral every 3 hours PRN Moderate Pain (4 - 6)  polyethylene glycol 3350 17 Gram(s) Oral daily PRN Constipation         Vitals log        ICU Vital Signs Last 24 Hrs  T(C): 36.7 (26 Oct 2018 07:15), Max: 37.1 (25 Oct 2018 23:00)  T(F): 98 (26 Oct 2018 07:15), Max: 98.7 (25 Oct 2018 23:00)  HR: 74 (26 Oct 2018 07:15) (74 - 91)  BP: 135/80 (26 Oct 2018 07:15) (102/64 - 135/80)  BP(mean): --  ABP: --  ABP(mean): --  RR: 16 (26 Oct 2018 07:15) (14 - 16)  SpO2: 96% (26 Oct 2018 07:15) (96% - 98%)           Input and Output:  I&O's Detail    25 Oct 2018 07:01  -  26 Oct 2018 07:00  --------------------------------------------------------  IN:  Total IN: 0 mL    OUT:    Voided: 400 mL  Total OUT: 400 mL    Total NET: -400 mL          Lab Data                        11.2   7.80  )-----------( 278      ( 26 Oct 2018 08:11 )             34.7     10-25    138  |  103  |  24<H>  ----------------------------<  203<H>  4.1   |  26  |  1.14    Ca    8.8      25 Oct 2018 08:00              Review of Systems	      Objective     Physical Examination    heart s1s2  lung dec BS  abd soft      Pertinent Lab findings & Imaging      Irene:  NO   Adequate UO     I&O's Detail    25 Oct 2018 07:01  -  26 Oct 2018 07:00  --------------------------------------------------------  IN:  Total IN: 0 mL    OUT:    Voided: 400 mL  Total OUT: 400 mL    Total NET: -400 mL               Discussed with:     Cultures:	        Radiology
Date/Time Patient Seen:  		  Referring MD:   Data Reviewed	       Patient is a 67y old  Male who presents with a chief complaint of S/P Left TKR 10/23 (25 Oct 2018 10:28)  vs and meds reviewed      Subjective/HPI     PAST MEDICAL & SURGICAL HISTORY:  Chronic pain of left knee  Sleep apnea: nasal mask-  BPH (benign prostatic hyperplasia)  Hyperlipidemia  Hypothyroid  Status post left knee replacement: 2016  No significant past surgical history        Medication list         MEDICATIONS  (STANDING):  acetaminophen   Tablet .. 1000 milliGRAM(s) Oral every 8 hours  aspirin enteric coated 81 milliGRAM(s) Oral two times a day  celecoxib 200 milliGRAM(s) Oral two times a day  docusate sodium 100 milliGRAM(s) Oral three times a day  influenza   Vaccine 0.5 milliLiter(s) IntraMuscular once  levothyroxine 50 MICROGram(s) Oral daily  pantoprazole    Tablet 40 milliGRAM(s) Oral <User Schedule>  senna 2 Tablet(s) Oral at bedtime  simvastatin 10 milliGRAM(s) Oral at bedtime  tamsulosin 0.4 milliGRAM(s) Oral at bedtime    MEDICATIONS  (PRN):  aluminum hydroxide/magnesium hydroxide/simethicone Suspension 30 milliLiter(s) Oral four times a day PRN Indigestion  bisacodyl Suppository 10 milliGRAM(s) Rectal daily PRN If no bowel movement by postoperative day #2  HYDROmorphone  Injectable 0.5 milliGRAM(s) IV Push every 3 hours PRN Severe Pain (7 - 10)  magnesium hydroxide Suspension 30 milliLiter(s) Oral daily PRN Constipation  ondansetron Injectable 4 milliGRAM(s) IV Push every 6 hours PRN Nausea and/or Vomiting  oxyCODONE    IR 5 milliGRAM(s) Oral every 3 hours PRN Mild Pain (1 - 3)  oxyCODONE    IR 10 milliGRAM(s) Oral every 3 hours PRN Moderate Pain (4 - 6)  polyethylene glycol 3350 17 Gram(s) Oral daily PRN Constipation         Vitals log        ICU Vital Signs Last 24 Hrs  T(C): 36.7 (25 Oct 2018 11:38), Max: 36.7 (24 Oct 2018 19:02)  T(F): 98.1 (25 Oct 2018 11:38), Max: 98.1 (24 Oct 2018 23:25)  HR: 91 (25 Oct 2018 11:38) (69 - 91)  BP: 102/64 (25 Oct 2018 11:38) (102/64 - 156/88)  BP(mean): --  ABP: --  ABP(mean): --  RR: 16 (25 Oct 2018 11:38) (16 - 16)  SpO2: 96% (25 Oct 2018 11:38) (94% - 98%)           Input and Output:  I&O's Detail    24 Oct 2018 07:01  -  25 Oct 2018 07:00  --------------------------------------------------------  IN:  Total IN: 0 mL    OUT:    Accordian: 180 mL    Voided: 1200 mL  Total OUT: 1380 mL    Total NET: -1380 mL          Lab Data                        11.6   8.52  )-----------( 299      ( 25 Oct 2018 08:00 )             35.7     10-25    138  |  103  |  24<H>  ----------------------------<  203<H>  4.1   |  26  |  1.14    Ca    8.8      25 Oct 2018 08:00              Review of Systems	      Objective     Physical Examination    heart s1s2  lung dc BS  abd soft  obese    Pertinent Lab findings & Imaging      Irene:  NO   Adequate UO     I&O's Detail    24 Oct 2018 07:01  -  25 Oct 2018 07:00  --------------------------------------------------------  IN:  Total IN: 0 mL    OUT:    Accordian: 180 mL    Voided: 1200 mL  Total OUT: 1380 mL    Total NET: -1380 mL               Discussed with:     Cultures:	        Radiology
Discharge medication calendar:  Aspirin EC 81mg q12h x 6 weeks  APAP 1000mg q8h x 2-3 weeks  Celecoxib 200mg q12h x 2-3 weeks  Pantoprazole 40mg QAM x 6 weeks  Narcotic PRN  Docusate 100mg TID while taking narcotic  Miralax, Senna, or Bisacodyl PRN for treatment of constipation
GLENNY GARCIAS                                                                [id number]                                                     Allergies---No Known Allergies        Pt is a 67y year old Male s/p left TKR.   Pt. is A&O x 3, resting comfortably, with no complaints.   Pain is 2/10.   Tolerating the diet.   Denies chest pain / shortness of breath / dyspnea / nausea / vomiting / headaches or light headed ness.         Vital Signs Last 24 Hrs  T(F): 98 (10-26-18 @ 07:15), Max: 98.7 (10-25-18 @ 23:00)  HR: 74 (10-26-18 @ 07:15)  BP: 135/80 (10-26-18 @ 07:15)  RR: 16 (10-26-18 @ 07:15)  SpO2: 96% (10-26-18 @ 07:15)    I&O's Detail    25 Oct 2018 07:01  -  26 Oct 2018 07:00  --------------------------------------------------------  IN:  Total IN: 0 mL    OUT:    Voided: 400 mL  Total OUT: 400 mL    Total NET: -400 mL        PE:   Left Lower Extremity:   Dressing is C/D/I.   Dressing changed.   Incision is clean and dry.   The wound is closed with sutures.   No redness, swelling, heat, discharge or other evidence of infection, superficial or deep.   Neurovascularly intact.   No gross evidence of motor or sensory deficit.   +2 DP/PT pulses.   EHL/FHL/TA intact.   Toes are pink and mobile.   Capillary refill < 2 seconds.   Negative calf tenderness.   PAS on.   CPM: 0-90                             11.2   7.80  )--------------( 278                          10-26-18 @ 08:11               34.7         A:   Pt is a 67y year old Male S/P left total knee replacement, Post Op Day #3        Plan:    - Follow up with Medicine    - OOB with PT/OT   - Pain control    - Incentive spirometry   - Discharge Planning home   - continue Use CPM   - DVT ppx = PAS +  aspirin enteric coated 81 milliGRAM(s) Oral two times a day                                                                                                                                                                             Nelson ARMIJO
ORTHOPEDIC ATTENDING PROGRESS NOTE  GLENNY GARCIAS      67y Male                                                                                                                               POD #  2      STATUS POST:               Pre-Op Dx: Primary localized osteoarthritis of left knee    Post-Op Dx:  Primary localized osteoarthritis of left knee    Procedure: Left total knee arthroplasty                                                Pain (0-10):  Pt reports  Current Pain Management:  [ ] PCA   [ x] Po Analgesics [ ] IM /IV Anagesics     T(F): 97.9  HR: 72  BP: 123/74  RR: 16  SpO2: 95%               Physical Exam :    -  Dressing C/D/I.   -   Distal Neurvascular status intact grossly.   -   Warm well perfused; capillary refill <3 seconds   -   (+)EHL/FHL   -   (+) Sensation to light touch  -   (-) Calf tenderness Bilaterally    A/P: 67y Male s/p Left total knee arthroplasty     -   Ortho Stable  -   Pain control   -   Medicine to follow  -   DVT ppx:     [ ]SCDs     [ x] ASA     [ ] Eliquis     [ ] Lovenox  -   Weight bearing status:  WBAT [x ]        PWB    [ ]     TTWB  [ ]      NWB  [ ]   -  Dispo:     Home [x ]     Acute Rehab [ ]     RICKY [ ]     TBD [ ]
ORTHOPEDIC ATTENDING PROGRESS NOTE  GLENNY GARCIAS      67y Male                                                                                                                               POD # 1       STATUS POST:               Pre-Op Dx: Primary localized osteoarthritis of left knee    Post-Op Dx:  Primary localized osteoarthritis of left knee    Procedure: Left total knee arthroplasty                                                Pain (0-10):  Pt reports  Current Pain Management:  [ ] PCA   [x ] Po Analgesics [ ] IM /IV Anagesics     T(F): 97.6  HR: 80  BP: 143/79  RR: 16  SpO2: 95%                         13.2   12.55 )-----------( 327      ( 24 Oct 2018 07:16 )             40.0         10-24    136  |  100  |  13  ----------------------------<  155<H>  4.1   |  29  |  1.02    Ca    8.9      24 Oct 2018 07:16      Physical Exam :    -  Dressing C/D/I.   -   Distal Neurvascular status intact grossly.   -   Warm well perfused; capillary refill <3 seconds   -   (+)EHL/FHL   -   (+) Sensation to light touch  -   (-) Calf tenderness Bilaterally    A/P: 67y Male s/p Left total knee arthroplasty     -   Ortho Stable  -   Pain control   -   Medicine to follow  -   DVT ppx:     [ ]SCDs     [x ] ASA     [ ] Eliquis     [ ] Lovenox  -   Weight bearing status:  WBAT [x ]        PWB    [ ]     TTWB  [ ]      NWB  [ ]   -  Dispo:     Home [x ]     Acute Rehab [ ]     RICKY [ ]     TBD [ ]
POD #: 2  S: Pt without complaints. No SOB,CP, N/V. Tolerated Diet well.  Pain comfortable (3/10 ) on  Interval Rx.   No BM yet, + flatus, No abdominal pain.  PT activity yesterday: Stood & stepped / Walked with walker  Pain Rx:  acetaminophen   Tablet .. 1000 milliGRAM(s) Oral every 8 hours  celecoxib 200 milliGRAM(s) Oral two times a day  HYDROmorphone  Injectable 0.5 milliGRAM(s) IV Push every 3 hours PRN  ondansetron Injectable 4 milliGRAM(s) IV Push every 6 hours PRN  oxyCODONE    IR 5 milliGRAM(s) Oral every 3 hours PRN  oxyCODONE    IR 10 milliGRAM(s) Oral every 3 hours PRN    O: General: On exam, No Apparent Distress  Vital Signs Last 24 Hrs  T(C): 36.6 (25 Oct 2018 07:55), Max: 36.7 (24 Oct 2018 19:02)  T(F): 97.9 (25 Oct 2018 07:55), Max: 98.1 (24 Oct 2018 23:25)  HR: 72 (25 Oct 2018 07:55) (69 - 90)  BP: 123/74 (25 Oct 2018 07:55) (120/74 - 156/88)  RR: 16 (25 Oct 2018 07:55) (16 - 17)  SpO2: 95% (25 Oct 2018 07:55) (94% - 98%)    Lungs: BS clear bilat.  Heart: RR&R  [Abdomen]: + BS, soft , benign exam     Ext(Knee): Left Knee [ [place] ] Dressing removed > [Incision] clean, dry, & intact,   Nylon sutures intact; no  dehiscence; No  cellulitis; MIn-mod effusion Minimal soft tissue swelling knee/thigh.   ROM: Extension 0 deg. ; Flexion 90 deg. PROM  CPM = 70 deg flexion[Increased to 75 deg]  Neurologic:  Has sensation over feet & toes bilat. Full AROM bilat feet & toes. EHL/AT = 5/5  Vascular: Feet toes warm, pink. DP = 2+. No calf tenderness bilat..  Urine Out [11P-7A] = Voided ; HVAC = 50 ml O/N > Removed  VTEP: On Bilat. Venodynes + aspirin enteric coated 81 milliGRAM(s) Oral two times a day    Activity in PT yesterday Noted.[Walked 50ft. with walker]. [Sat up for 1 hours].    Hospitalist input noted.  Labs Today:   CBC:                      11.6   8.52  )-----------( 299      ( 25 Oct 2018 08:00 )             35.7       10-25  Chem:  138  |  103  |  24<H>  ----------------------------<  203<H>  4.1   |  26  |  1.14    Primary Orthopedic Assessment:  • Stable from Orthopedic perspective  • Neuro motor exam stable  • Labs: CBC / Chem stable      Plan:   • Continue:  PT/OT/WBAT with assistance of a walker/Ice to knee/ Knee ROM /CPm / Incentive spirometry encouraged   • Continue DVT prophylaxis as prescribed, including use of compression devices and ankle pumps  • Continue Pain Rx  • Plans per Medicine   • Discharge planning – anticipated discharge is Home D/C with Home care & Home PT /   when medically stable & cleared by PT/OT
Patient is a 67y old  Male who presents with a chief complaint of S/P Left TKR 10/23 (25 Oct 2018 10:28)        HPI:68 yo male with pmh of cad/hypothyroid/ bph/ GILSON on cpap , s/p left total knee replacement day 2      SUBJECTIVE & OBJECTIVE: Pt seen and examined at bedside, no acute complaints     PHYSICAL EXAM:  T(C): 36.7 (10-25-18 @ 11:38), Max: 36.7 (10-24-18 @ 19:02)  HR: 91 (10-25-18 @ 11:38) (69 - 91)  BP: 102/64 (10-25-18 @ 11:38) (102/64 - 156/88)  RR: 16 (10-25-18 @ 11:38) (16 - 16)  SpO2: 96% (10-25-18 @ 11:38) (94% - 98%)  Wt(kg): --   GENERAL: NAD, well-groomed, well-developed  ENMT: Moist mucous membranes  NECK: Supple, No JVD  NERVOUS SYSTEM:  Alert & Oriented X3,   CHEST/LUNG: Clear to auscultation bilaterally; No rales, rhonchi, wheezing, or rubs  HEART: Regular rate and rhythm; No murmurs, rubs, or gallops  ABDOMEN: Soft, Nontender, Nondistended; Bowel sounds present  EXTREMITIES:  2+ Peripheral Pulses, No clubbing, cyanosis, or edema        MEDICATIONS  (STANDING):  acetaminophen   Tablet .. 1000 milliGRAM(s) Oral every 8 hours  aspirin enteric coated 81 milliGRAM(s) Oral two times a day  celecoxib 200 milliGRAM(s) Oral two times a day  docusate sodium 100 milliGRAM(s) Oral three times a day  influenza   Vaccine 0.5 milliLiter(s) IntraMuscular once  levothyroxine 50 MICROGram(s) Oral daily  pantoprazole    Tablet 40 milliGRAM(s) Oral <User Schedule>  senna 2 Tablet(s) Oral at bedtime  simvastatin 10 milliGRAM(s) Oral at bedtime  tamsulosin 0.4 milliGRAM(s) Oral at bedtime    MEDICATIONS  (PRN):  aluminum hydroxide/magnesium hydroxide/simethicone Suspension 30 milliLiter(s) Oral four times a day PRN Indigestion  bisacodyl Suppository 10 milliGRAM(s) Rectal daily PRN If no bowel movement by postoperative day #2  HYDROmorphone  Injectable 0.5 milliGRAM(s) IV Push every 3 hours PRN Severe Pain (7 - 10)  magnesium hydroxide Suspension 30 milliLiter(s) Oral daily PRN Constipation  ondansetron Injectable 4 milliGRAM(s) IV Push every 6 hours PRN Nausea and/or Vomiting  oxyCODONE    IR 5 milliGRAM(s) Oral every 3 hours PRN Mild Pain (1 - 3)  oxyCODONE    IR 10 milliGRAM(s) Oral every 3 hours PRN Moderate Pain (4 - 6)  polyethylene glycol 3350 17 Gram(s) Oral daily PRN Constipation      LABS:                        11.6   8.52  )-----------( 299      ( 25 Oct 2018 08:00 )             35.7     10-25    138  |  103  |  24<H>  ----------------------------<  203<H>  4.1   |  26  |  1.14    Ca    8.8      25 Oct 2018 08:00            CAPILLARY BLOOD GLUCOSE          CAPILLARY BLOOD GLUCOSE        CAPILLARY BLOOD GLUCOSE                RECENT CULTURES:      RADIOLOGY & ADDITIONAL TESTS:                        DVT/GI ppx  Discussed with pt @ bedside
Patient is a 67y old  Male who presents with a chief complaint of s/p total knee replacement (24 Oct 2018 08:38)        HPI:  66 yo male with pmh of cad/hypothyroid/ bph/ GILSON on cpap , s/p left total knee replacement day 1    SUBJECTIVE & OBJECTIVE: Pt seen and examined at bedside, no acute complaints     PHYSICAL EXAM:  T(C): 36.4 (10-24-18 @ 07:52), Max: 36.8 (10-23-18 @ 13:19)  HR: 80 (10-24-18 @ 07:52) (68 - 103)  BP: 143/79 (10-24-18 @ 07:52) (91/71 - 165/80)  RR: 16 (10-24-18 @ 07:52) (13 - 21)  SpO2: 95% (10-24-18 @ 07:52) (95% - 100%)  Wt(kg): -- Height (cm): 160.02 (10-23 @ 13:19)  Weight (kg): 79.1 (10-23 @ 13:19)  BMI (kg/m2): 30.9 (10-23 @ 13:19)  BSA (m2): 1.82 (10-23 @ 13:19)  GENERAL: NAD, well-groomed, well-developed  HEAD:  Atraumatic, Normocephalic  NERVOUS SYSTEM:  Alert & Oriented X3, Motor Strength 5/5 B/L upper and lower extremities; DTRs 2+ intact and symmetric  CHEST/LUNG: decrease air entry at the bases   HEART: Regular rate and rhythm; No murmurs, rubs, or gallops  ABDOMEN: Soft, Nontender, Nondistended; Bowel sounds present  EXTREMITIES:  2+ Peripheral Pulses, No clubbing, cyanosis, or edema        MEDICATIONS  (STANDING):  acetaminophen   Tablet .. 1000 milliGRAM(s) Oral every 8 hours  acetaminophen  IVPB .. 1000 milliGRAM(s) IV Intermittent every 6 hours  aspirin enteric coated 81 milliGRAM(s) Oral two times a day  celecoxib 200 milliGRAM(s) Oral two times a day  docusate sodium 100 milliGRAM(s) Oral three times a day  influenza   Vaccine 0.5 milliLiter(s) IntraMuscular once  lactated ringers. 1000 milliLiter(s) (125 mL/Hr) IV Continuous <Continuous>  levothyroxine 50 MICROGram(s) Oral daily  pantoprazole    Tablet 40 milliGRAM(s) Oral <User Schedule>  senna 2 Tablet(s) Oral at bedtime  simvastatin 10 milliGRAM(s) Oral at bedtime  tamsulosin 0.4 milliGRAM(s) Oral at bedtime    MEDICATIONS  (PRN):  aluminum hydroxide/magnesium hydroxide/simethicone Suspension 30 milliLiter(s) Oral four times a day PRN Indigestion  HYDROmorphone  Injectable 0.5 milliGRAM(s) IV Push every 3 hours PRN Severe Pain (7 - 10)  magnesium hydroxide Suspension 30 milliLiter(s) Oral daily PRN Constipation  ondansetron Injectable 4 milliGRAM(s) IV Push every 6 hours PRN Nausea and/or Vomiting  oxyCODONE    IR 5 milliGRAM(s) Oral every 3 hours PRN Mild Pain (1 - 3)  oxyCODONE    IR 10 milliGRAM(s) Oral every 3 hours PRN Moderate Pain (4 - 6)  polyethylene glycol 3350 17 Gram(s) Oral daily PRN Constipation      LABS:                        13.2   12.55 )-----------( 327      ( 24 Oct 2018 07:16 )             40.0     10-24    136  |  100  |  13  ----------------------------<  155<H>  4.1   |  29  |  1.02    Ca    8.9      24 Oct 2018 07:16            CAPILLARY BLOOD GLUCOSE          CAPILLARY BLOOD GLUCOSE        CAPILLARY BLOOD GLUCOSE                RECENT CULTURES:      RADIOLOGY & ADDITIONAL TESTS:                        DVT/GI ppx  Discussed with pt @ bedside
Date/Time Patient Seen:  		  Referring MD:   Data Reviewed	       Patient is a 67y old  Male who presents with a chief complaint of pain left knee (23 Oct 2018 17:59)  in bed  seen and examined  vs and meds reviewed      Subjective/HPI     PAST MEDICAL & SURGICAL HISTORY:  Chronic pain of left knee  Sleep apnea: nasal mask-  BPH (benign prostatic hyperplasia)  Hyperlipidemia  Hypothyroid  Status post left knee replacement: 2016  No significant past surgical history        Medication list         MEDICATIONS  (STANDING):  acetaminophen   Tablet .. 1000 milliGRAM(s) Oral every 8 hours  acetaminophen  IVPB .. 1000 milliGRAM(s) IV Intermittent every 6 hours  aspirin enteric coated 81 milliGRAM(s) Oral two times a day  ceFAZolin   IVPB 2000 milliGRAM(s) IV Intermittent every 8 hours  celecoxib 200 milliGRAM(s) Oral two times a day  docusate sodium 100 milliGRAM(s) Oral three times a day  influenza   Vaccine 0.5 milliLiter(s) IntraMuscular once  lactated ringers. 1000 milliLiter(s) (125 mL/Hr) IV Continuous <Continuous>  levothyroxine 50 MICROGram(s) Oral daily  pantoprazole    Tablet 40 milliGRAM(s) Oral <User Schedule>  senna 2 Tablet(s) Oral at bedtime  simvastatin 10 milliGRAM(s) Oral at bedtime  tamsulosin 0.4 milliGRAM(s) Oral at bedtime    MEDICATIONS  (PRN):  aluminum hydroxide/magnesium hydroxide/simethicone Suspension 30 milliLiter(s) Oral four times a day PRN Indigestion  HYDROmorphone  Injectable 0.5 milliGRAM(s) IV Push every 3 hours PRN Severe Pain (7 - 10)  magnesium hydroxide Suspension 30 milliLiter(s) Oral daily PRN Constipation  ondansetron Injectable 4 milliGRAM(s) IV Push every 6 hours PRN Nausea and/or Vomiting  oxyCODONE    IR 5 milliGRAM(s) Oral every 3 hours PRN Mild Pain (1 - 3)  oxyCODONE    IR 10 milliGRAM(s) Oral every 3 hours PRN Moderate Pain (4 - 6)  polyethylene glycol 3350 17 Gram(s) Oral daily PRN Constipation         Vitals log        ICU Vital Signs Last 24 Hrs  T(C): 36.3 (24 Oct 2018 02:30), Max: 36.8 (23 Oct 2018 13:19)  T(F): 97.4 (24 Oct 2018 02:30), Max: 98.3 (23 Oct 2018 13:19)  HR: 90 (24 Oct 2018 02:30) (68 - 103)  BP: 150/68 (24 Oct 2018 02:30) (91/71 - 165/80)  BP(mean): --  ABP: --  ABP(mean): --  RR: 16 (24 Oct 2018 02:30) (13 - 21)  SpO2: 97% (24 Oct 2018 02:30) (95% - 100%)           Input and Output:  I&O's Detail    23 Oct 2018 07:01  -  24 Oct 2018 06:30  --------------------------------------------------------  IN:    lactated ringers.: 1400 mL    Oral Fluid: 400 mL  Total IN: 1800 mL    OUT:    Accordian: 60 mL    Intermittent Catheterization - Urethral: 50 mL    Voided: 900 mL  Total OUT: 1010 mL    Total NET: 790 mL          Lab Data                        13.9   x     )-----------( x        ( 23 Oct 2018 20:16 )             42.8     10-23    134<L>  |  101  |  15  ----------------------------<  217<H>  4.2   |  27  |  1.20    Ca    8.7      23 Oct 2018 20:16              Review of Systems	      Objective     Physical Examination    heart s1s2  lung dec BS  head at  obese      Pertinent Lab findings & Imaging      Irene:  NO   Adequate UO     I&O's Detail    23 Oct 2018 07:01  -  24 Oct 2018 06:30  --------------------------------------------------------  IN:    lactated ringers.: 1400 mL    Oral Fluid: 400 mL  Total IN: 1800 mL    OUT:    Accordian: 60 mL    Intermittent Catheterization - Urethral: 50 mL    Voided: 900 mL  Total OUT: 1010 mL    Total NET: 790 mL               Discussed with:     Cultures:	        Radiology
Patient is a 67y old  Male who presents with a chief complaint of S/P Left TKR 10/23 (25 Oct 2018 10:28)        HPI:66 yo male with pmh of cad/hypothyroid/ bph/ GILSON on cpap , s/p left total knee replacement day 3      SUBJECTIVE & OBJECTIVE: Pt seen and examined at bedside, no acute complaints      PHYSICAL EXAM:  T(C): 36.7 (10-26-18 @ 07:15), Max: 37.1 (10-25-18 @ 23:00)  HR: 74 (10-26-18 @ 07:15) (74 - 91)  BP: 135/80 (10-26-18 @ 07:15) (102/64 - 135/80)  RR: 16 (10-26-18 @ 07:15) (14 - 16)  SpO2: 96% (10-26-18 @ 07:15) (96% - 98%)  Wt(kg): --   GENERAL: NAD, well-groomed, well-developed  HEAD:  Atraumatic, Normocephalic  EYES: EOMI, PERRLA, conjunctiva and sclera clear  ENMT: Moist mucous membranes  NECK: Supple, No JVD  NERVOUS SYSTEM:  Alert & Oriented X3, Motor Strength 5/5 B/L upper and lower extremities; DTRs 2+ intact and symmetric  CHEST/LUNG: Clear to auscultation bilaterally; No rales, rhonchi, wheezing, or rubs  HEART: Regular rate and rhythm; No murmurs, rubs, or gallops  ABDOMEN: Soft, Nontender, Nondistended; Bowel sounds present  EXTREMITIES:  2+ Peripheral Pulses, No clubbing, cyanosis, or edema        MEDICATIONS  (STANDING):  acetaminophen   Tablet .. 1000 milliGRAM(s) Oral every 8 hours  aspirin enteric coated 81 milliGRAM(s) Oral two times a day  celecoxib 200 milliGRAM(s) Oral two times a day  docusate sodium 100 milliGRAM(s) Oral three times a day  influenza   Vaccine 0.5 milliLiter(s) IntraMuscular once  levothyroxine 50 MICROGram(s) Oral daily  pantoprazole    Tablet 40 milliGRAM(s) Oral <User Schedule>  senna 2 Tablet(s) Oral at bedtime  simvastatin 10 milliGRAM(s) Oral at bedtime  tamsulosin 0.4 milliGRAM(s) Oral at bedtime    MEDICATIONS  (PRN):  aluminum hydroxide/magnesium hydroxide/simethicone Suspension 30 milliLiter(s) Oral four times a day PRN Indigestion  bisacodyl Suppository 10 milliGRAM(s) Rectal daily PRN If no bowel movement by postoperative day #2  HYDROmorphone  Injectable 0.5 milliGRAM(s) IV Push every 3 hours PRN Severe Pain (7 - 10)  magnesium hydroxide Suspension 30 milliLiter(s) Oral daily PRN Constipation  ondansetron Injectable 4 milliGRAM(s) IV Push every 6 hours PRN Nausea and/or Vomiting  oxyCODONE    IR 5 milliGRAM(s) Oral every 3 hours PRN Mild Pain (1 - 3)  oxyCODONE    IR 10 milliGRAM(s) Oral every 3 hours PRN Moderate Pain (4 - 6)  polyethylene glycol 3350 17 Gram(s) Oral daily PRN Constipation      LABS:                        11.2   7.80  )-----------( 278      ( 26 Oct 2018 08:11 )             34.7     10-25    138  |  103  |  24<H>  ----------------------------<  203<H>  4.1   |  26  |  1.14    Ca    8.8      25 Oct 2018 08:00            CAPILLARY BLOOD GLUCOSE          CAPILLARY BLOOD GLUCOSE        CAPILLARY BLOOD GLUCOSE                RECENT CULTURES:      RADIOLOGY & ADDITIONAL TESTS:                        DVT/GI ppx  Discussed with pt @ bedside

## 2018-10-26 NOTE — PROGRESS NOTE ADULT - PROVIDER SPECIALTY LIST ADULT
Hospitalist
Hospitalist
Orthopedics
Pharmacy
Pulmonology
Hospitalist

## 2018-10-26 NOTE — PROGRESS NOTE ADULT - PROBLEM SELECTOR PLAN 1
post op care  I steven  oral and skin care  DM care and diet  CPAP night time for GILSON, uses Nasal Mask, keep sat > 88 pct  weight loss  sleep hygiene  am labs pending  monitor vs and HD and Sat  caution with Opioids  PT and Ortho follow up  will follow
karl  Dm  htn  cvs regimen  BP control  dm diet  dm care  PT  ortho follow up  CPAP use night time  sleep hygiene  weight management  wound care  pain management, bowel regimen  will follow and monitor
post op  GILSON  CPAP night time  I steven  sleep hygiene discussed  caution with Opioids  oral and skin care  wound care  PT  ortho follow up  medical management of comorbidities
s/p left total knee replacement  pt/ot  pain control
s/p left total knee replacement 2  pt/ot  pain control  d/c planning per SW
s/p left total knee replacement 3  pt/ot  pain control  d/c planning per SW

## 2018-10-26 NOTE — PROGRESS NOTE ADULT - PROBLEM SELECTOR PROBLEM 1
Chronic pain of left knee
Sleep apnea
Sleep apnea
Chronic pain of left knee

## 2018-11-08 ENCOUNTER — APPOINTMENT (OUTPATIENT)
Dept: ORTHOPEDIC SURGERY | Facility: CLINIC | Age: 68
End: 2018-11-08
Payer: MEDICARE

## 2018-11-08 PROCEDURE — 99024 POSTOP FOLLOW-UP VISIT: CPT

## 2018-11-08 PROCEDURE — 73560 X-RAY EXAM OF KNEE 1 OR 2: CPT | Mod: LT

## 2018-11-11 PROCEDURE — 73562 X-RAY EXAM OF KNEE 3: CPT

## 2018-11-11 PROCEDURE — 97535 SELF CARE MNGMENT TRAINING: CPT

## 2018-11-11 PROCEDURE — C1776: CPT

## 2018-11-11 PROCEDURE — 97165 OT EVAL LOW COMPLEX 30 MIN: CPT

## 2018-11-11 PROCEDURE — C1889: CPT

## 2018-11-11 PROCEDURE — 80048 BASIC METABOLIC PNL TOTAL CA: CPT

## 2018-11-11 PROCEDURE — 88305 TISSUE EXAM BY PATHOLOGIST: CPT

## 2018-11-11 PROCEDURE — 90686 IIV4 VACC NO PRSV 0.5 ML IM: CPT

## 2018-11-11 PROCEDURE — 97116 GAIT TRAINING THERAPY: CPT

## 2018-11-11 PROCEDURE — 85014 HEMATOCRIT: CPT

## 2018-11-11 PROCEDURE — 36415 COLL VENOUS BLD VENIPUNCTURE: CPT

## 2018-11-11 PROCEDURE — 97530 THERAPEUTIC ACTIVITIES: CPT

## 2018-11-11 PROCEDURE — 97110 THERAPEUTIC EXERCISES: CPT

## 2018-11-11 PROCEDURE — C1713: CPT

## 2018-11-11 PROCEDURE — 97161 PT EVAL LOW COMPLEX 20 MIN: CPT

## 2018-11-11 PROCEDURE — 94660 CPAP INITIATION&MGMT: CPT

## 2018-11-11 PROCEDURE — 85027 COMPLETE CBC AUTOMATED: CPT

## 2018-11-11 PROCEDURE — 85018 HEMOGLOBIN: CPT

## 2018-11-11 PROCEDURE — 88311 DECALCIFY TISSUE: CPT

## 2018-12-20 ENCOUNTER — APPOINTMENT (OUTPATIENT)
Dept: ORTHOPEDIC SURGERY | Facility: CLINIC | Age: 68
End: 2018-12-20
Payer: MEDICARE

## 2018-12-20 VITALS — BODY MASS INDEX: 28.7 KG/M2 | WEIGHT: 162 LBS | HEIGHT: 63 IN

## 2018-12-20 PROCEDURE — 73562 X-RAY EXAM OF KNEE 3: CPT | Mod: LT

## 2018-12-20 PROCEDURE — 99024 POSTOP FOLLOW-UP VISIT: CPT

## 2019-01-30 ENCOUNTER — APPOINTMENT (OUTPATIENT)
Dept: ORTHOPEDIC SURGERY | Facility: CLINIC | Age: 69
End: 2019-01-30
Payer: MEDICARE

## 2019-01-30 PROCEDURE — 99213 OFFICE O/P EST LOW 20 MIN: CPT

## 2019-01-30 PROCEDURE — 73562 X-RAY EXAM OF KNEE 3: CPT | Mod: LT

## 2019-04-24 ENCOUNTER — APPOINTMENT (OUTPATIENT)
Dept: ORTHOPEDIC SURGERY | Facility: CLINIC | Age: 69
End: 2019-04-24
Payer: MEDICARE

## 2019-04-24 VITALS — BODY MASS INDEX: 28.7 KG/M2 | HEIGHT: 63 IN | WEIGHT: 162 LBS

## 2019-04-24 PROCEDURE — 73562 X-RAY EXAM OF KNEE 3: CPT | Mod: LT

## 2019-04-24 PROCEDURE — 99214 OFFICE O/P EST MOD 30 MIN: CPT

## 2019-07-29 ENCOUNTER — APPOINTMENT (OUTPATIENT)
Dept: ORTHOPEDIC SURGERY | Facility: CLINIC | Age: 69
End: 2019-07-29

## 2019-08-08 ENCOUNTER — APPOINTMENT (OUTPATIENT)
Dept: ORTHOPEDIC SURGERY | Facility: CLINIC | Age: 69
End: 2019-08-08
Payer: MEDICARE

## 2019-08-08 VITALS — BODY MASS INDEX: 28.7 KG/M2 | HEIGHT: 63 IN | WEIGHT: 162 LBS

## 2019-08-08 DIAGNOSIS — M25.569 PAIN IN UNSPECIFIED KNEE: ICD-10-CM

## 2019-08-08 PROCEDURE — 73562 X-RAY EXAM OF KNEE 3: CPT | Mod: 50

## 2019-08-08 PROCEDURE — 99213 OFFICE O/P EST LOW 20 MIN: CPT

## 2019-10-02 ENCOUNTER — APPOINTMENT (OUTPATIENT)
Dept: ORTHOPEDIC SURGERY | Facility: CLINIC | Age: 69
End: 2019-10-02

## 2019-10-30 ENCOUNTER — APPOINTMENT (OUTPATIENT)
Dept: ORTHOPEDIC SURGERY | Facility: CLINIC | Age: 69
End: 2019-10-30
Payer: MEDICARE

## 2019-10-30 DIAGNOSIS — M65.342 TRIGGER FINGER, LEFT RING FINGER: ICD-10-CM

## 2019-10-30 PROCEDURE — 73562 X-RAY EXAM OF KNEE 3: CPT | Mod: LT

## 2019-10-30 PROCEDURE — 99214 OFFICE O/P EST MOD 30 MIN: CPT

## 2019-10-31 ENCOUNTER — APPOINTMENT (OUTPATIENT)
Dept: GASTROENTEROLOGY | Facility: CLINIC | Age: 69
End: 2019-10-31
Payer: MEDICARE

## 2019-10-31 VITALS
DIASTOLIC BLOOD PRESSURE: 72 MMHG | HEIGHT: 63 IN | WEIGHT: 177 LBS | TEMPERATURE: 98.1 F | HEART RATE: 67 BPM | BODY MASS INDEX: 31.36 KG/M2 | OXYGEN SATURATION: 96 % | SYSTOLIC BLOOD PRESSURE: 110 MMHG

## 2019-10-31 DIAGNOSIS — Z87.891 PERSONAL HISTORY OF NICOTINE DEPENDENCE: ICD-10-CM

## 2019-10-31 DIAGNOSIS — R06.6 HICCOUGH: ICD-10-CM

## 2019-10-31 DIAGNOSIS — Z80.0 FAMILY HISTORY OF MALIGNANT NEOPLASM OF DIGESTIVE ORGANS: ICD-10-CM

## 2019-10-31 DIAGNOSIS — Z86.39 PERSONAL HISTORY OF OTHER ENDOCRINE, NUTRITIONAL AND METABOLIC DISEASE: ICD-10-CM

## 2019-10-31 DIAGNOSIS — R12 HEARTBURN: ICD-10-CM

## 2019-10-31 DIAGNOSIS — Z82.49 FAMILY HISTORY OF ISCHEMIC HEART DISEASE AND OTHER DISEASES OF THE CIRCULATORY SYSTEM: ICD-10-CM

## 2019-10-31 PROCEDURE — 99203 OFFICE O/P NEW LOW 30 MIN: CPT

## 2019-10-31 RX ORDER — MULTIVITAMIN
TABLET ORAL
Refills: 0 | Status: ACTIVE | COMMUNITY

## 2019-10-31 RX ORDER — TAMSULOSIN HYDROCHLORIDE 0.4 MG/1
0.4 CAPSULE ORAL
Refills: 0 | Status: ACTIVE | COMMUNITY

## 2019-10-31 RX ORDER — ASPIRIN 81 MG
81 TABLET, DELAYED RELEASE (ENTERIC COATED) ORAL
Refills: 0 | Status: ACTIVE | COMMUNITY

## 2019-10-31 NOTE — PHYSICAL EXAM
[General Appearance - Alert] : alert [General Appearance - In No Acute Distress] : in no acute distress [Outer Ear] : the ears and nose were normal in appearance [Oropharynx] : the oropharynx was normal [Neck Appearance] : the appearance of the neck was normal [Neck Cervical Mass (___cm)] : no neck mass was observed [Jugular Venous Distention Increased] : there was no jugular-venous distention [Thyroid Diffuse Enlargement] : the thyroid was not enlarged [Thyroid Nodule] : there were no palpable thyroid nodules [Auscultation Breath Sounds / Voice Sounds] : lungs were clear to auscultation bilaterally [Heart Rate And Rhythm] : heart rate was normal and rhythm regular [Heart Sounds] : normal S1 and S2 [Heart Sounds Gallop] : no gallops [Murmurs] : no murmurs [Heart Sounds Pericardial Friction Rub] : no pericardial rub [Edema] : there was no peripheral edema [Bowel Sounds] : normal bowel sounds [Abdomen Soft] : soft [Abdomen Tenderness] : non-tender [] : no hepato-splenomegaly [Abdomen Mass (___ Cm)] : no abdominal mass palpated [No CVA Tenderness] : no ~M costovertebral angle tenderness [No Spinal Tenderness] : no spinal tenderness [Oriented To Time, Place, And Person] : oriented to person, place, and time [Impaired Insight] : insight and judgment were intact [Affect] : the affect was normal

## 2019-11-01 NOTE — CONSULT LETTER
[FreeTextEntry1] : Dear Dr. Eugenia Estevez,\par \par I had the pleasure of seeing your patient GLENNY GARCIAS in the office today.  My office note is attached.\par \par Thank you very much for allowing me to participate in the care of your patient.\par \par Sincerely,\par \par Tio Mackay M.D., FAC, FACP\par Director, Celiac Program at [facility]\par  of Medicine\par [employer] [facility] [place] Practice Director,\par [facility] - Gastroenterology/Internal Medicine at [place]\[facility] [address]\[place] [address]\par Tel: [phone]\par Email: [email or web address] \par \par The attached note has been created using a voice recognition system (Dragon).  There may be some misspellings and typos.  Please call my office if you have any issues or questions.

## 2019-11-01 NOTE — HISTORY OF PRESENT ILLNESS
[FreeTextEntry1] : The patient is a 68-year-old man who reports intermittent symptoms of heartburn usually with spicy foods.  He also gets intermittent episodes of hiccups.  He takes Protonix about twice a month.  He denies dysphasia or abdominal pain.  He has one solid bowel movement a day without melena or bright red blood per rectum.  The patient's weight is stable.  His last colonoscopy was in 2015.  He had a knee replacement 1 year ago.  Otherwise, the patient has not been hospitalized in the past year and denies any cardiac issues.

## 2019-11-01 NOTE — ASSESSMENT
[FreeTextEntry1] : Patient with symptoms of intermittent heartburn and hiccups.\par \par An EGD has been scheduled. The risks, benefits, alternatives, and limitations of the procedure were explained.\par \par Patient will require a colonoscopy in 2020.

## 2019-11-15 ENCOUNTER — APPOINTMENT (OUTPATIENT)
Dept: GASTROENTEROLOGY | Facility: AMBULATORY MEDICAL SERVICES | Age: 69
End: 2019-11-15
Payer: MEDICARE

## 2019-11-15 PROCEDURE — 43239 EGD BIOPSY SINGLE/MULTIPLE: CPT

## 2019-11-15 NOTE — DISCHARGE NOTE ADULT - HAS THE PATIENT RECEIVED THE INFLUENZA VACCINE THIS SEASON?
Detail Level: Detailed Plan: Parents advised to wash areas clean on extremities in 3 hours, forehead in 30 minutes. yes...

## 2019-11-21 ENCOUNTER — TRANSCRIPTION ENCOUNTER (OUTPATIENT)
Age: 69
End: 2019-11-21

## 2019-12-18 ENCOUNTER — APPOINTMENT (OUTPATIENT)
Dept: GASTROENTEROLOGY | Facility: CLINIC | Age: 69
End: 2019-12-18
Payer: MEDICARE

## 2019-12-18 VITALS
OXYGEN SATURATION: 97 % | BODY MASS INDEX: 31.54 KG/M2 | SYSTOLIC BLOOD PRESSURE: 110 MMHG | WEIGHT: 178 LBS | HEIGHT: 63 IN | TEMPERATURE: 98 F | DIASTOLIC BLOOD PRESSURE: 60 MMHG | HEART RATE: 73 BPM

## 2019-12-18 DIAGNOSIS — K44.9 DIAPHRAGMATIC HERNIA W/OUT OBSTRUCTION OR GANGRENE: ICD-10-CM

## 2019-12-18 DIAGNOSIS — K29.70 GASTRITIS, UNSPECIFIED, W/OUT BLEEDING: ICD-10-CM

## 2019-12-18 PROCEDURE — 99214 OFFICE O/P EST MOD 30 MIN: CPT

## 2019-12-18 NOTE — PHYSICAL EXAM
[General Appearance - Alert] : alert [General Appearance - In No Acute Distress] : in no acute distress [Outer Ear] : the ears and nose were normal in appearance [Neck Appearance] : the appearance of the neck was normal [Oropharynx] : the oropharynx was normal [Neck Cervical Mass (___cm)] : no neck mass was observed [Jugular Venous Distention Increased] : there was no jugular-venous distention [Thyroid Diffuse Enlargement] : the thyroid was not enlarged [Thyroid Nodule] : there were no palpable thyroid nodules [Heart Rate And Rhythm] : heart rate was normal and rhythm regular [Auscultation Breath Sounds / Voice Sounds] : lungs were clear to auscultation bilaterally [Heart Sounds] : normal S1 and S2 [Heart Sounds Gallop] : no gallops [Heart Sounds Pericardial Friction Rub] : no pericardial rub [Edema] : there was no peripheral edema [Murmurs] : no murmurs [Abdomen Soft] : soft [Bowel Sounds] : normal bowel sounds [Abdomen Tenderness] : non-tender [Abdomen Mass (___ Cm)] : no abdominal mass palpated [] : no hepato-splenomegaly [No Spinal Tenderness] : no spinal tenderness [No CVA Tenderness] : no ~M costovertebral angle tenderness [Oriented To Time, Place, And Person] : oriented to person, place, and time [Impaired Insight] : insight and judgment were intact [Affect] : the affect was normal

## 2019-12-22 NOTE — HISTORY OF PRESENT ILLNESS
[FreeTextEntry1] : The patient is status post EGD performed on November 15, 2019.  The examination was significant for a 1 cm hiatal hernia with grade 4 reflux esophagitis with linear ulcers and moderate gastritis.  Biopsies showed reflux esophagitis and the presence of H. pylori.  The patient has been on pantoprazole 40 mg a day and feels much better denying heartburn, dysphasia, or hiccups.\par \par \par Note from 10/31/2019 - The patient is a 68-year-old man who reports intermittent symptoms of heartburn usually with spicy foods.  He also gets intermittent episodes of hiccups.  He takes Protonix about twice a month.  He denies dysphasia or abdominal pain.  He has one solid bowel movement a day without melena or bright red blood per rectum.  The patient's weight is stable.  His last colonoscopy was in 2015.  He had a knee replacement 1 year ago.  Otherwise, the patient has not been hospitalized in the past year and denies any cardiac issues.

## 2019-12-22 NOTE — ASSESSMENT
[FreeTextEntry1] : Patient with H. pylori gastritis, grade 4 reflux esophagitis, and a hiatal hernia.\par \par We will treat the H. pylori with tetracycline 500 mg 3 times daily, metronidazole 500 mg 3 times daily, Pepto-Bismol 2 tablets 3 times a day, and pantoprazole 40 mg twice daily for 14 days.  I had a long discussion with the patient regarding potential side effects of the medication and the need to avoid alcohol for the entirety of the treatment and for 3 days afterwards.\par \par Patient will then continue pantoprazole 40 mg a day.\par \par Patient will return to see me in 6 to 8 weeks to assess for eradication.\par \par Patient is due for a colonoscopy next year.\par \par \par Plan from 10/31/2019 - Patient with symptoms of intermittent heartburn and hiccups.\par \par An EGD has been scheduled. The risks, benefits, alternatives, and limitations of the procedure were explained.\par \par Patient will require a colonoscopy in 2020.

## 2019-12-22 NOTE — CONSULT LETTER
[FreeTextEntry1] : Dear Dr. Eugenia Estevez,\par \par I had the pleasure of seeing your patient GLENNY GARCIAS in the office today.  My office note is attached.\par \par Thank you very much for allowing me to participate in the care of your patient.\par \par Sincerely,\par \par Tio Mackay M.D., FAC, FACP\par Director, Celiac Program at [facility]\par  of Medicine\par [employer] at [facility] Practice Director,\par [facility] - Gastroenterology/Internal Medicine at [place]\[facility] [address]\[place] [address]\par Tel: [phone]\par Email: [email or web address] \par \par The attached note has been created using a voice recognition system (Dragon).  There may be some misspellings and typos.  Please call my office if you have any issues or questions.

## 2020-01-01 ENCOUNTER — RX RENEWAL (OUTPATIENT)
Age: 70
End: 2020-01-01

## 2020-01-01 RX ORDER — PANTOPRAZOLE 40 MG/1
40 TABLET, DELAYED RELEASE ORAL
Qty: 90 | Refills: 1 | Status: ACTIVE | COMMUNITY
Start: 2019-11-15 | End: 1900-01-01

## 2020-01-13 ENCOUNTER — APPOINTMENT (OUTPATIENT)
Dept: ORTHOPEDIC SURGERY | Facility: CLINIC | Age: 70
End: 2020-01-13
Payer: MEDICARE

## 2020-01-13 VITALS — HEIGHT: 63 IN | BODY MASS INDEX: 31.54 KG/M2 | WEIGHT: 178 LBS

## 2020-01-13 PROCEDURE — 99213 OFFICE O/P EST LOW 20 MIN: CPT

## 2020-01-13 PROCEDURE — 73562 X-RAY EXAM OF KNEE 3: CPT | Mod: LT

## 2020-01-13 NOTE — PHYSICAL EXAM
[de-identified] : Constitutional\par o Appearance : well-nourished, well developed, alert, in no acute distress \par Head and Face\par o Head :\par ¦ Inspection : atraumatic, normocephalic\par o Face :\par ¦ Inspection : no visible rash or discoloration\par Respiratory\par o Respiratory Effort: breathing unlabored \par Neurologic\par o Mental Status Examination :\par ¦ Orientation : alert and oriented X 3\par Psychiatric\par o Mood and Affect: mood normal, affect appropriate\par Cardiovascular\par o Observation/Palpation : - no swelling\par Lymphatic\par o Additional Nodes : No palpable lymph nodes present\par \par Right Lower Extremity\par o Buttock : no tenderness, swelling or deformities \par o Right Hip :\par    - Inspection/Palpation : no tenderness, no swelling or deformities\par    - Range of Motion : full and painless in all planes, no crepitance\par    - Stability : joint stability intact\par    - Strength : extension, flexion, adduction, abduction, internal rotation and external rotation 4/5 \par    - Tests: Chata’s test negative\par o Right Knee :\par ¦ Inspection/Palpation : mild peripatellar  tenderness to palpation, no swelling, well-healed incision\par ¦ Range of Motion : active flexion and extension full and painless patellofemoral  crepitance with extension\par ¦ Stability : no valgus or varus instability present on provocative testing\par ¦ Strength : flexion and extension 4/5\par ¦ Tests and Signs : negative Anterior Drawer\par \par Left Upper Extremity \par o Left Hand :\par ¦ Inspection/Palpation : minimal tenderness over the A1 pulley tenderness to palpation, triggering of the ring finger. \par ¦ Range of Motion : full arc of motion in the small joints of the hand, no discomfort elicited\par ¦ Strength : all intrinsic and extrinsic hand muscles 5/5\par ¦ Stability : no joint instability on provocative testing \par o Sensation : sensation intact to light touch\par o Skin : no skin lesions or discoloration \par \par Left Lower Extremity\par o Buttock : no tenderness, swelling or deformities \par o Left Hip :\par    - Inspection/Palpation : no tenderness, no swelling or deformities\par    - Range of Motion : full and painless in all planes, no crepitance\par    - Stability : joint stability intact\par    - Strength : extension, flexion, adduction, abduction, internal rotation and external rotation 5/5 \par    - Tests: Chata’s test negative\par o Left Knee :\par ¦ Inspection/Palpation : no tenderness to palpation, no swelling, well-healed incision\par ¦ Range of Motion : active flexion and extension full and painless, no crepitance\par ¦ Stability : no valgus or varus instability present on provocative testing\par ¦ Strength : flexion and extension 4+/5\par ¦ Tests and Signs : negative Anterior Drawer\par \par Gait and Station:\par Gait: gait normal, no significant extremity swelling or lymphedema, good proprioception and balance\par \par Radiology Results\par o Left Knee : Standing AP,lateral and skyline views of the knee were obtained and revealed excellent position of the total knee replacement with central tracking of the patella.

## 2020-01-13 NOTE — DISCUSSION/SUMMARY
[de-identified] : I discussed the underlying pathophysiology of the patient's condition in great detail with the patient. I went over the patient's x-rays with them in great detail. The patient was instructed in ROM exercises they are to do at home. At-home strengthening exercises were discussed and demonstrated in great detail with the patient, with an exercise sheet being provided. I provided him with a theraband so can use at home. He should purchase Velcro ankle weight that range from 0-5 pounds and should begin a diligent at-home exercise program. He should fill a basin with cold water and squeeze a sponge as an exercise for his left hand.  Gone over these exercises extensively with him demonstrated them for him he understands the importance of doing them prior to restarting PT\par \par I showed the patient tightening and terminal  extension exercises for the legs. I recommend continuing weight loss and FU in 6 months. The patient is progressing well. For dental work no longer has to take antibiotics unless the dentist recommends so. I am prescribing PT.

## 2020-01-13 NOTE — ADDENDUM
[FreeTextEntry1] : I, German Cavanaugh, acted solely as a scribe for Dr. Brasher on 01/13/2020  .\par  \par All medical record entries made by the scribe were at my, Dr. Brasher, direction and personally dictated by me on 01/13/2020. I have reviewed the chart and agree that the record accurately reflects my personal performance of the history, physical exam, assessment and plan. I have also personally directed, reviewed, and agreed with the chart.

## 2020-01-13 NOTE — HISTORY OF PRESENT ILLNESS
[de-identified] : 68 year old male presents with left knee pain. He is s/p right TKR in 2016, and left TKR done on 10/23/18. He notes discomfort  in his right knee which has been ongoing since his  sounds of "clicking." He has some difficultly with stairs due to the pain, and pushing off his right knee. He was not able to go to PT because he was not approved. He notes difficulty kneeling on the right side has not been able to go to therapy as he has used up his visits.  He is not exercising at home on his own with weights or bands.  He feels the left knee is great he does have some mid arc discomfort on the right

## 2020-03-17 ENCOUNTER — APPOINTMENT (OUTPATIENT)
Dept: GASTROENTEROLOGY | Facility: CLINIC | Age: 70
End: 2020-03-17

## 2020-05-27 ENCOUNTER — APPOINTMENT (OUTPATIENT)
Dept: GASTROENTEROLOGY | Facility: CLINIC | Age: 70
End: 2020-05-27
Payer: MEDICARE

## 2020-05-27 VITALS
TEMPERATURE: 98.1 F | HEIGHT: 63 IN | BODY MASS INDEX: 30.12 KG/M2 | SYSTOLIC BLOOD PRESSURE: 124 MMHG | HEART RATE: 67 BPM | DIASTOLIC BLOOD PRESSURE: 70 MMHG | WEIGHT: 170 LBS | OXYGEN SATURATION: 98 %

## 2020-05-27 DIAGNOSIS — A04.8 OTHER SPECIFIED BACTERIAL INTESTINAL INFECTIONS: ICD-10-CM

## 2020-05-27 PROCEDURE — 99214 OFFICE O/P EST MOD 30 MIN: CPT

## 2020-05-27 RX ORDER — TETRACYCLINE HYDROCHLORIDE 500 MG/1
500 CAPSULE ORAL
Qty: 42 | Refills: 0 | Status: DISCONTINUED | COMMUNITY
Start: 2019-12-18 | End: 2020-05-27

## 2020-05-27 RX ORDER — BISMUTH SUBSALICYLATE 262 MG
262 TABLET,CHEWABLE ORAL 3 TIMES DAILY
Qty: 84 | Refills: 0 | Status: DISCONTINUED | COMMUNITY
Start: 2019-12-18 | End: 2020-05-27

## 2020-05-27 RX ORDER — METRONIDAZOLE 500 MG/1
500 TABLET ORAL 3 TIMES DAILY
Qty: 42 | Refills: 0 | Status: DISCONTINUED | COMMUNITY
Start: 2019-12-18 | End: 2020-05-27

## 2020-05-27 RX ORDER — PANTOPRAZOLE 40 MG/1
40 TABLET, DELAYED RELEASE ORAL
Qty: 104 | Refills: 1 | Status: DISCONTINUED | COMMUNITY
Start: 2019-12-18 | End: 2020-05-27

## 2020-05-27 NOTE — CONSULT LETTER
[FreeTextEntry1] : Dear Dr. Eugenia Estevez,\[facility] \[facility] I had the pleasure of seeing your patient GLENNY GARCIAS in the office today.  My office note is attached. PLEASE READ THE "ASSESSMENT" SECTION OF THE NOTE TO SEE MY IMPRESSION AND PLAN.\par \[facility] Thank you very much for allowing me to participate in the care of your patient.\[facility] \[facility] Sincerely,\[facility] \[facility] Tio Mackay M.D., FAC, [facility] Director, Celiac Program at [facility]  of Medicine\[facility] [employer] at [facility] Practice Director,\[facility] - Gastroenterology/Internal Medicine at [address]\[place], [place] [address] Tel: [phone]\[facility] Email: [email or web address] \[facility] \[facility] The attached note has been created using a voice recognition system (Dragon).  There may be some misspellings and typos.  Please call my office if you have any issues or questions.

## 2020-05-27 NOTE — HISTORY OF PRESENT ILLNESS
[FreeTextEntry1] : The patient is status post treatment for H. pylori with tetracycline, metronidazole, Pepto-Bismol, and pantoprazole for 14 days.  He is now on pantoprazole once a day for reflux esophagitis.  He denies heartburn, dysphasia, or abdominal pain.  He does however have frequent throat clearing.  He also notes nasal and sinus congestion.  Bowel movements are normal with 2 bowel movements a day.  He denies melena or bright red blood per rectum.  He has lost 8 pounds intentionally.\par \par \par Note from 12/18/2019 - The patient is status post EGD performed on November 15, 2019.  The examination was significant for a 1 cm hiatal hernia with grade 4 reflux esophagitis with linear ulcers and moderate gastritis.  Biopsies showed reflux esophagitis and the presence of H. pylori.  The patient has been on pantoprazole 40 mg a day and feels much better denying heartburn, dysphasia, or hiccups.\par \par \par Note from 10/31/2019 - The patient is a 68-year-old man who reports intermittent symptoms of heartburn usually with spicy foods.  He also gets intermittent episodes of hiccups.  He takes Protonix about twice a month.  He denies dysphasia or abdominal pain.  He has one solid bowel movement a day without melena or bright red blood per rectum.  The patient's weight is stable.  His last colonoscopy was in 2015.  He had a knee replacement 1 year ago.  Otherwise, the patient has not been hospitalized in the past year and denies any cardiac issues.

## 2020-05-27 NOTE — PHYSICAL EXAM
[General Appearance - Alert] : alert [General Appearance - In No Acute Distress] : in no acute distress [Neck Appearance] : the appearance of the neck was normal [Neck Cervical Mass (___cm)] : no neck mass was observed [Thyroid Diffuse Enlargement] : the thyroid was not enlarged [Jugular Venous Distention Increased] : there was no jugular-venous distention [Thyroid Nodule] : there were no palpable thyroid nodules [Auscultation Breath Sounds / Voice Sounds] : lungs were clear to auscultation bilaterally [Heart Rate And Rhythm] : heart rate was normal and rhythm regular [Murmurs] : no murmurs [Heart Sounds Gallop] : no gallops [Heart Sounds] : normal S1 and S2 [Heart Sounds Pericardial Friction Rub] : no pericardial rub [Bowel Sounds] : normal bowel sounds [Abdomen Soft] : soft [Edema] : there was no peripheral edema [] : no hepato-splenomegaly [Abdomen Tenderness] : non-tender [Abdomen Mass (___ Cm)] : no abdominal mass palpated [No CVA Tenderness] : no ~M costovertebral angle tenderness [No Spinal Tenderness] : no spinal tenderness [Impaired Insight] : insight and judgment were intact [Affect] : the affect was normal [Oriented To Time, Place, And Person] : oriented to person, place, and time

## 2020-05-27 NOTE — ASSESSMENT
[FreeTextEntry1] : Patient status post treatment for H. pylori gastritis.  He is on pantoprazole 40 mg a day for reflux esophagitis.  He denies heartburn but does have throat clearing as well as sinus and nasal congestion.\par \par Patient will go for an H. pylori breath test off of pantoprazole for 2 weeks.  The patient was advised that he may develop heartburn during this.  And that he can use Tums as needed.  He was also advised to be especially careful with his diet when he is off the pantoprazole.\par \par Patient was advised to see an ENT specialist to evaluate the congestion and throat clearing.\par \par Patient is due for a screening colonoscopy next year.\par \par \par Plan from 12/18/2019 - Patient with H. pylori gastritis, grade 4 reflux esophagitis, and a hiatal hernia.\par \par We will treat the H. pylori with tetracycline 500 mg 3 times daily, metronidazole 500 mg 3 times daily, Pepto-Bismol 2 tablets 3 times a day, and pantoprazole 40 mg twice daily for 14 days.  I had a long discussion with the patient regarding potential side effects of the medication and the need to avoid alcohol for the entirety of the treatment and for 3 days afterwards.\par \par Patient will then continue pantoprazole 40 mg a day.\par \par Patient will return to see me in 6 to 8 weeks to assess for eradication.\par \par Patient is due for a colonoscopy next year.\par \par \par Plan from 10/31/2019 - Patient with symptoms of intermittent heartburn and hiccups.\par \par An EGD has been scheduled. The risks, benefits, alternatives, and limitations of the procedure were explained.\par \par Patient will require a colonoscopy in 2020.

## 2020-06-01 ENCOUNTER — APPOINTMENT (OUTPATIENT)
Dept: OTOLARYNGOLOGY | Facility: CLINIC | Age: 70
End: 2020-06-01
Payer: MEDICARE

## 2020-06-01 VITALS
BODY MASS INDEX: 30.12 KG/M2 | HEART RATE: 65 BPM | SYSTOLIC BLOOD PRESSURE: 135 MMHG | WEIGHT: 170 LBS | DIASTOLIC BLOOD PRESSURE: 75 MMHG | HEIGHT: 63 IN | TEMPERATURE: 97.7 F

## 2020-06-01 DIAGNOSIS — R68.89 OTHER GENERAL SYMPTOMS AND SIGNS: ICD-10-CM

## 2020-06-01 DIAGNOSIS — R09.81 NASAL CONGESTION: ICD-10-CM

## 2020-06-01 DIAGNOSIS — R09.82 POSTNASAL DRIP: ICD-10-CM

## 2020-06-01 DIAGNOSIS — H69.83 OTHER SPECIFIED DISORDERS OF EUSTACHIAN TUBE, BILATERAL: ICD-10-CM

## 2020-06-01 PROCEDURE — 99204 OFFICE O/P NEW MOD 45 MIN: CPT | Mod: 25

## 2020-06-01 PROCEDURE — 31231 NASAL ENDOSCOPY DX: CPT

## 2020-06-01 PROCEDURE — 92557 COMPREHENSIVE HEARING TEST: CPT

## 2020-06-01 PROCEDURE — 92567 TYMPANOMETRY: CPT

## 2020-06-01 PROCEDURE — G0268 REMOVAL OF IMPACTED WAX MD: CPT

## 2020-06-01 NOTE — REVIEW OF SYSTEMS
[Post Nasal Drip] : post nasal drip [Sneezing] : sneezing [Hearing Loss] : hearing loss [Nasal Congestion] : nasal congestion [Throat Clearing] : throat clearing [Negative] : Heme/Lymph

## 2020-06-01 NOTE — ASSESSMENT
[FreeTextEntry1] : Patient complaining of postnasal drip difficulty breathing as well as ear discomfort and diminished hearing on examination massive cerumen impaction curetted out bilaterally revealing normal tympanic membranes endoscopically is a severely deviated septum is right nasal cavity and very swollen turbinates put him on Flonase nasal spray audiogram confirm essentially mild bilateral symmetrical hearing loss he will follow-up with us as needed no further acute interventions indicated at this time and he will continue with his Flonase nasal spray which should help him with his postnasal drip.

## 2020-06-01 NOTE — HISTORY OF PRESENT ILLNESS
[de-identified] : PAtient has had nasal congestion and ear congestion for the last few weeks. He does not have any dizziness or ringing in the ears. He thinks his hearing is good bilaterally but there is minor muffling since the ears have been clogged with what he thinks is wax. He has minor nasal congestion bilaterally that comes and goes. He has some sneezing during the allergy season but does not know if he has allergies. He feels like there is a lot of mucus in the throat and he clears his throat all the time. He does not have any throat pain right now nor issues eating and drinking.

## 2020-06-18 LAB — UREA BREATH TEST QL: NEGATIVE

## 2020-07-24 NOTE — PHYSICAL THERAPY INITIAL EVALUATION ADULT - DID THE PATIENT HAVE SURGERY?
[address]  [address] [facility], [address] [place] [address]  [phone]    Tele health follow up Note      Kelton Almaguer     Date of Visit:  7/24/2020    CC:  Tele health follow up   Chief Complaint   Patient presents with    Follow Up After Procedure      # 1 Fluoroscopic guided lumbar medial branch blocks Left at Levels: L2,3,4. HPI:    Follow up in his low back pain with no acute issues. .  Change in quality of symptoms:no. Medication side effects:not applicable . Recent diagnostic testing:none. Recent interventional procedures:Left L2,3,4 MBB good outcome with more than 60% improvement in his pain for one week.     He has not been on anticoagulation medications to include none. The patient  has not been on herbal supplements. The patient is not diabetic.     Imaging:   Lumbar spine MRI 2019    1.  At L2-3, there is a progressing left paracentral and foraminal disc   osteophyte complex abutting both the exiting L2 nerve and traversing L3   nerve root.  There is progressing moderate central canal stenosis. [place]   is progressing moderate to severe right foraminal stenosis. 2.  At L3-4, the central canal is well decompressed.  There is a   persistent left foraminal disc protrusion abutting the exiting L3 nerve.    There is stable moderate right foraminal stenosis. 3.  At L4-5, the central canal is well decompressed.  There is a   persistent right foraminal disc osteophyte complex abutting the exiting   L4 nerve.  There is progressing moderate to severe left foraminal   stenosis. 4.  At L5-S1, the central canal is well decompressed.  There is stable   mild bilateral foraminal stenosis. Anatomic variant: None.  L4-5 is considered the level of the iliac crest   and assume there are 5 lumbar-type vertebrae.     Previous treatments: Physical Therapy, Surgery L3-5 laminectomy and medications. Guadalupe Oliveros                                        Potential Aberrant Drug-Related Behavior:    None      Urine Drug Screening:  None, no opioids started      OARRS report:  07/2020 consistent     Past Medical History: Reviewed    Past Surgical History: Reviewed     Home Medications: Reviewed    Allergies: Reviewed     Social History: Reviewed     REVIEW OF SYSTEMS:     Pride Bernstein denies fever/chills, chest pain, shortness of breath, new bowel or bladder complaints. All other review of systems was negative. PHYSICAL EXAMINATION:      General:       General appearance:   pleasant and well-hydrated. , in mild to moderate discomfort and A & O x3  Build:Overweight     HEENT:     Head:normocephalic and atraumatic  Pupils:regular, round and equal.  Sclera: icterus absent,      Lungs:     Breathing:Breathing Pattern: regular, no distress     Abdomen:     Shape:obese and non-distended  Tenderness:none     Lumbar spine:     Spine inspection:surgical incision scar   CVA tenderness:yes right    Palpation:tenderness paravertebral muscles, facet loading, Left, positive and tenderness. Right negative  Range of motion:abnormal moderately Lateral bending, flexion, extension rotation Left and is  painful.     Musculoskeletal:     Trigger points in Paraveteral:absent bilaterally  SI joint tenderness:negative right, negative left              TRAE test:negative right, negative left  Piriformis tenderness:negative right, negative left  Trochanteric bursa tenderness:negative right, negative left  SLR:negative right, negative left, sitting      Extremities:     Tremors:None bilaterally upper and lower  Range of motion: pain with internal rotation of hips negative.   Intact:Yes  Edema:Normal     Neurological:     Sensory:diminished to light touch lateral aspect of right leg  Motor:                              Right Quadriceps5/5          Left Quadriceps5/5           Right Gastrocnemius5/5    Left Gastrocnemius5/5  Right Ant Tibialis5/5  Left Ant Tibialis5/5  Reflexes:    Right Quadriceps reflex2+  Left left TKR/yes

## 2020-09-09 ENCOUNTER — APPOINTMENT (OUTPATIENT)
Dept: ORTHOPEDIC SURGERY | Facility: CLINIC | Age: 70
End: 2020-09-09

## 2020-10-20 ENCOUNTER — APPOINTMENT (OUTPATIENT)
Dept: OTOLARYNGOLOGY | Facility: CLINIC | Age: 70
End: 2020-10-20

## 2021-08-16 ENCOUNTER — APPOINTMENT (OUTPATIENT)
Dept: ORTHOPEDIC SURGERY | Facility: CLINIC | Age: 71
End: 2021-08-16

## 2021-09-02 ENCOUNTER — APPOINTMENT (OUTPATIENT)
Dept: ORTHOPEDIC SURGERY | Facility: CLINIC | Age: 71
End: 2021-09-02
Payer: MEDICARE

## 2021-09-02 ENCOUNTER — NON-APPOINTMENT (OUTPATIENT)
Age: 71
End: 2021-09-02

## 2021-09-02 VITALS
BODY MASS INDEX: 30.12 KG/M2 | DIASTOLIC BLOOD PRESSURE: 83 MMHG | HEIGHT: 63 IN | SYSTOLIC BLOOD PRESSURE: 126 MMHG | WEIGHT: 170 LBS

## 2021-09-02 PROCEDURE — 73562 X-RAY EXAM OF KNEE 3: CPT | Mod: 50

## 2021-09-02 PROCEDURE — 99214 OFFICE O/P EST MOD 30 MIN: CPT

## 2021-09-02 NOTE — HISTORY OF PRESENT ILLNESS
[de-identified] : 70 year old male presents s/p bilateral TKR, right knee done in 2016 and left knee done 10/23/18. He was last seen in 01/2020. He was going to PT but stopped due to COVID-19. He has not returned to PT since. He is complaining of clicking in the right knee that worsens with exercise, deep squatting and on steps. It can be painful. He notes no swelling or buckling. He  Does however claim he has difficulty with steps and feels pain with stair descent.  Pmhx, He is fully COVID-19 vaccinated.

## 2021-09-02 NOTE — ADDENDUM
[FreeTextEntry1] : I, Everette Kuhn, acted solely as a scribe for Dr. Quincy Brasher on this date 09/02/2021.\par All medical record entries made by the Scribe were at my, Dr. Quincy Brasher, direction and personally dictated by me on 09/02/2021. I have reviewed the chart and agree that the record accurately reflects my personal performance of the history, physical exam, assessment and plan. I have also personally directed, reviewed, and agreed with the chart.

## 2021-09-02 NOTE — DISCUSSION/SUMMARY
[de-identified] : I discussed the underlying pathophysiology of the patient's condition in great detail with the patient and used models to aid in my explanation. I went over the patient's x-rays with them in great detail. We discussed a possible right knee liner exchange surgery to put in a thicker liner. I am recommending we try PT before considering surgery. At this time, he will start a course of physical therapy for strengthening and flexibility. A prescription for physical therapy was provided. He should avoid deep squatting. All of his questions were answered. He understands and consents to the plan.\par \par FU 4-6 weeks.\par after undergoing PT for both knees.

## 2021-09-02 NOTE — PHYSICAL EXAM
[de-identified] : Constitutional\par o Appearance : well-nourished, well developed, alert, in no acute distress \par Head and Face\par o Head :\par ¦ Inspection : atraumatic, normocephalic\par o Face :\par ¦ Inspection : no visible rash or discoloration\par Respiratory\par o Respiratory Effort: breathing unlabored \par Neurologic\par o Mental Status Examination :\par ¦ Orientation : alert and oriented X 3\par Psychiatric\par o Mood and Affect: mood normal, affect appropriate\par Cardiovascular\par o Observation/Palpation : - no swelling\par Lymphatic\par o Additional Nodes : No palpable lymph nodes present\par \par Right Lower Extremity\par o Buttock : no tenderness, swelling or deformities \par o Right Hip :\par    - Inspection/Palpation : no tenderness, no swelling or deformities\par    - Range of Motion : full and painless in all planes, no crepitance\par    - Stability : joint stability intact\par    - Strength : extension, flexion, adduction, abduction, internal rotation and external rotation 4/5 \par    - Tests: Chata’s test negative\par o Right Knee :\par ¦ Inspection/Palpation : mild peripatellar tenderness to palpation, no swelling, well-healed incision\par ¦ Range of Motion : 0-135° painless, goes into recurvatum, hypermobility of the patella with crepitance\par ¦ Stability : no valgus or varus instability present on provocative testing\par ¦ Strength : flexion and extension 4/5\par ¦ Tests and Signs : positive Anterior Drawer\par \par Left Lower Extremity\par o Buttock : no tenderness, swelling or deformities \par o Left Hip :\par    - Inspection/Palpation : no tenderness, no swelling or deformities\par    - Range of Motion : full and painless in all planes, no crepitance\par    - Stability : joint stability intact\par    - Strength : extension, flexion, adduction, abduction, internal rotation and external rotation 5/5 \par    - Tests: Chata’s test negative\par o Left Knee :\par ¦ Inspection/Palpation : no tenderness to palpation, no swelling, well-healed incision\par ¦ Range of Motion : 0-140°  painless, no crepitance, good patellofemoral glide \par ¦ Stability : no valgus or varus instability present on provocative testing\par ¦ Strength : flexion and extension 4+/5\par ¦ Tests and Signs : negative Anterior Drawer\par \par Gait and Station:\par Gait: gait normal, no significant extremity swelling or lymphedema, good proprioception and balance\par \par Radiology Results\par o Right Knee : Standing AP, Lateral and skyline views of the knee were obtained and revealed excellent position of his right total knee replacement with slight lateral tracking of the patella.\par o Left Knee : Standing AP, Lateral and skyline views of the knee were obtained and revealed excellent position of his left total knee replacement with central tracking of the patella.

## 2021-09-22 ENCOUNTER — INPATIENT (INPATIENT)
Facility: HOSPITAL | Age: 71
LOS: 0 days | Discharge: HOME | DRG: 287 | End: 2021-09-23
Attending: INTERNAL MEDICINE | Admitting: INTERNAL MEDICINE
Payer: COMMERCIAL

## 2021-09-22 VITALS
OXYGEN SATURATION: 98 % | TEMPERATURE: 97 F | SYSTOLIC BLOOD PRESSURE: 138 MMHG | DIASTOLIC BLOOD PRESSURE: 88 MMHG | RESPIRATION RATE: 18 BRPM | HEART RATE: 87 BPM

## 2021-09-22 DIAGNOSIS — N40.0 BENIGN PROSTATIC HYPERPLASIA WITHOUT LOWER URINARY TRACT SYMPTOMS: ICD-10-CM

## 2021-09-22 DIAGNOSIS — R91.8 OTHER NONSPECIFIC ABNORMAL FINDING OF LUNG FIELD: ICD-10-CM

## 2021-09-22 DIAGNOSIS — G47.33 OBSTRUCTIVE SLEEP APNEA (ADULT) (PEDIATRIC): ICD-10-CM

## 2021-09-22 DIAGNOSIS — R73.03 PREDIABETES: ICD-10-CM

## 2021-09-22 DIAGNOSIS — I20.0 UNSTABLE ANGINA: ICD-10-CM

## 2021-09-22 DIAGNOSIS — Z96.652 PRESENCE OF LEFT ARTIFICIAL KNEE JOINT: Chronic | ICD-10-CM

## 2021-09-22 DIAGNOSIS — R14.0 ABDOMINAL DISTENSION (GASEOUS): ICD-10-CM

## 2021-09-22 DIAGNOSIS — E03.9 HYPOTHYROIDISM, UNSPECIFIED: ICD-10-CM

## 2021-09-22 LAB
ALBUMIN SERPL ELPH-MCNC: 4.6 G/DL — SIGNIFICANT CHANGE UP (ref 3.3–5)
ALP SERPL-CCNC: 58 U/L — SIGNIFICANT CHANGE UP (ref 40–120)
ALT FLD-CCNC: 15 U/L — SIGNIFICANT CHANGE UP (ref 10–45)
ANION GAP SERPL CALC-SCNC: 13 MMOL/L — SIGNIFICANT CHANGE UP (ref 5–17)
APTT BLD: 31.4 SEC — SIGNIFICANT CHANGE UP (ref 27.5–35.5)
AST SERPL-CCNC: 20 U/L — SIGNIFICANT CHANGE UP (ref 10–40)
BASOPHILS # BLD AUTO: 0.05 K/UL — SIGNIFICANT CHANGE UP (ref 0–0.2)
BASOPHILS NFR BLD AUTO: 0.8 % — SIGNIFICANT CHANGE UP (ref 0–2)
BILIRUB SERPL-MCNC: 0.3 MG/DL — SIGNIFICANT CHANGE UP (ref 0.2–1.2)
BUN SERPL-MCNC: 14 MG/DL — SIGNIFICANT CHANGE UP (ref 7–23)
CALCIUM SERPL-MCNC: 9.7 MG/DL — SIGNIFICANT CHANGE UP (ref 8.4–10.5)
CHLORIDE SERPL-SCNC: 102 MMOL/L — SIGNIFICANT CHANGE UP (ref 96–108)
CO2 SERPL-SCNC: 24 MMOL/L — SIGNIFICANT CHANGE UP (ref 22–31)
CREAT SERPL-MCNC: 1 MG/DL — SIGNIFICANT CHANGE UP (ref 0.5–1.3)
D DIMER BLD IA.RAPID-MCNC: <150 NG/ML DDU — SIGNIFICANT CHANGE UP
EOSINOPHIL # BLD AUTO: 0.25 K/UL — SIGNIFICANT CHANGE UP (ref 0–0.5)
EOSINOPHIL NFR BLD AUTO: 3.8 % — SIGNIFICANT CHANGE UP (ref 0–6)
GLUCOSE SERPL-MCNC: 84 MG/DL — SIGNIFICANT CHANGE UP (ref 70–99)
HCT VFR BLD CALC: 44.8 % — SIGNIFICANT CHANGE UP (ref 39–50)
HGB BLD-MCNC: 14.2 G/DL — SIGNIFICANT CHANGE UP (ref 13–17)
IMM GRANULOCYTES NFR BLD AUTO: 0.3 % — SIGNIFICANT CHANGE UP (ref 0–1.5)
INR BLD: 0.92 RATIO — SIGNIFICANT CHANGE UP (ref 0.88–1.16)
LYMPHOCYTES # BLD AUTO: 1.29 K/UL — SIGNIFICANT CHANGE UP (ref 1–3.3)
LYMPHOCYTES # BLD AUTO: 19.8 % — SIGNIFICANT CHANGE UP (ref 13–44)
MCHC RBC-ENTMCNC: 27.2 PG — SIGNIFICANT CHANGE UP (ref 27–34)
MCHC RBC-ENTMCNC: 31.7 GM/DL — LOW (ref 32–36)
MCV RBC AUTO: 85.8 FL — SIGNIFICANT CHANGE UP (ref 80–100)
MONOCYTES # BLD AUTO: 0.47 K/UL — SIGNIFICANT CHANGE UP (ref 0–0.9)
MONOCYTES NFR BLD AUTO: 7.2 % — SIGNIFICANT CHANGE UP (ref 2–14)
NEUTROPHILS # BLD AUTO: 4.43 K/UL — SIGNIFICANT CHANGE UP (ref 1.8–7.4)
NEUTROPHILS NFR BLD AUTO: 68.1 % — SIGNIFICANT CHANGE UP (ref 43–77)
NRBC # BLD: 0 /100 WBCS — SIGNIFICANT CHANGE UP (ref 0–0)
PLATELET # BLD AUTO: 313 K/UL — SIGNIFICANT CHANGE UP (ref 150–400)
POTASSIUM SERPL-MCNC: 4 MMOL/L — SIGNIFICANT CHANGE UP (ref 3.5–5.3)
POTASSIUM SERPL-SCNC: 4 MMOL/L — SIGNIFICANT CHANGE UP (ref 3.5–5.3)
PROT SERPL-MCNC: 7.2 G/DL — SIGNIFICANT CHANGE UP (ref 6–8.3)
PROTHROM AB SERPL-ACNC: 11.1 SEC — SIGNIFICANT CHANGE UP (ref 10.6–13.6)
RBC # BLD: 5.22 M/UL — SIGNIFICANT CHANGE UP (ref 4.2–5.8)
RBC # FLD: 13.8 % — SIGNIFICANT CHANGE UP (ref 10.3–14.5)
SARS-COV-2 RNA SPEC QL NAA+PROBE: SIGNIFICANT CHANGE UP
SODIUM SERPL-SCNC: 139 MMOL/L — SIGNIFICANT CHANGE UP (ref 135–145)
TROPONIN T, HIGH SENSITIVITY RESULT: 7 NG/L — SIGNIFICANT CHANGE UP (ref 0–51)
TROPONIN T, HIGH SENSITIVITY RESULT: <6 NG/L — SIGNIFICANT CHANGE UP (ref 0–51)
WBC # BLD: 6.51 K/UL — SIGNIFICANT CHANGE UP (ref 3.8–10.5)
WBC # FLD AUTO: 6.51 K/UL — SIGNIFICANT CHANGE UP (ref 3.8–10.5)

## 2021-09-22 PROCEDURE — 93010 ELECTROCARDIOGRAM REPORT: CPT

## 2021-09-22 PROCEDURE — 75574 CT ANGIO HRT W/3D IMAGE: CPT | Mod: 26,MA

## 2021-09-22 PROCEDURE — 99223 1ST HOSP IP/OBS HIGH 75: CPT

## 2021-09-22 PROCEDURE — 71045 X-RAY EXAM CHEST 1 VIEW: CPT | Mod: 26

## 2021-09-22 PROCEDURE — 99285 EMERGENCY DEPT VISIT HI MDM: CPT | Mod: 25

## 2021-09-22 RX ORDER — CLOPIDOGREL BISULFATE 75 MG/1
600 TABLET, FILM COATED ORAL ONCE
Refills: 0 | Status: COMPLETED | OUTPATIENT
Start: 2021-09-22 | End: 2021-09-22

## 2021-09-22 RX ORDER — ATORVASTATIN CALCIUM 80 MG/1
80 TABLET, FILM COATED ORAL AT BEDTIME
Refills: 0 | Status: DISCONTINUED | OUTPATIENT
Start: 2021-09-22 | End: 2021-09-23

## 2021-09-22 RX ORDER — CLOPIDOGREL BISULFATE 75 MG/1
75 TABLET, FILM COATED ORAL DAILY
Refills: 0 | Status: DISCONTINUED | OUTPATIENT
Start: 2021-09-23 | End: 2021-09-23

## 2021-09-22 RX ORDER — TAMSULOSIN HYDROCHLORIDE 0.4 MG/1
0.4 CAPSULE ORAL AT BEDTIME
Refills: 0 | Status: DISCONTINUED | OUTPATIENT
Start: 2021-09-22 | End: 2021-09-23

## 2021-09-22 RX ORDER — LEVOTHYROXINE SODIUM 125 MCG
1 TABLET ORAL
Qty: 0 | Refills: 0 | DISCHARGE

## 2021-09-22 RX ORDER — METOPROLOL TARTRATE 50 MG
100 TABLET ORAL ONCE
Refills: 0 | Status: DISCONTINUED | OUTPATIENT
Start: 2021-09-22 | End: 2021-09-22

## 2021-09-22 RX ORDER — ASPIRIN/CALCIUM CARB/MAGNESIUM 324 MG
81 TABLET ORAL DAILY
Refills: 0 | Status: DISCONTINUED | OUTPATIENT
Start: 2021-09-22 | End: 2021-09-23

## 2021-09-22 RX ORDER — METOPROLOL TARTRATE 50 MG
12.5 TABLET ORAL
Refills: 0 | Status: DISCONTINUED | OUTPATIENT
Start: 2021-09-22 | End: 2021-09-23

## 2021-09-22 RX ORDER — METOPROLOL TARTRATE 50 MG
25 TABLET ORAL ONCE
Refills: 0 | Status: COMPLETED | OUTPATIENT
Start: 2021-09-22 | End: 2021-09-22

## 2021-09-22 RX ORDER — FINASTERIDE 5 MG/1
5 TABLET, FILM COATED ORAL DAILY
Refills: 0 | Status: DISCONTINUED | OUTPATIENT
Start: 2021-09-22 | End: 2021-09-23

## 2021-09-22 RX ORDER — LEVOTHYROXINE SODIUM 125 MCG
50 TABLET ORAL DAILY
Refills: 0 | Status: DISCONTINUED | OUTPATIENT
Start: 2021-09-22 | End: 2021-09-23

## 2021-09-22 RX ORDER — ACETAMINOPHEN 500 MG
650 TABLET ORAL EVERY 6 HOURS
Refills: 0 | Status: DISCONTINUED | OUTPATIENT
Start: 2021-09-22 | End: 2021-09-23

## 2021-09-22 RX ADMIN — CLOPIDOGREL BISULFATE 600 MILLIGRAM(S): 75 TABLET, FILM COATED ORAL at 22:08

## 2021-09-22 RX ADMIN — ATORVASTATIN CALCIUM 80 MILLIGRAM(S): 80 TABLET, FILM COATED ORAL at 22:08

## 2021-09-22 RX ADMIN — Medication 650 MILLIGRAM(S): at 21:34

## 2021-09-22 RX ADMIN — Medication 81 MILLIGRAM(S): at 23:43

## 2021-09-22 RX ADMIN — TAMSULOSIN HYDROCHLORIDE 0.4 MILLIGRAM(S): 0.4 CAPSULE ORAL at 22:08

## 2021-09-22 RX ADMIN — Medication 25 MILLIGRAM(S): at 15:05

## 2021-09-22 NOTE — ED PROVIDER NOTE - PHYSICAL EXAMINATION
GEN: Patient awake alert NAD.   HEENT: normocephalic, atraumatic, EOMI, no scleral icterus  CARDIAC: RRR, S1, S2, no murmur.   PULM: CTA B/L no wheeze, rhonchi, rales.   ABD: soft NT, ND, no rebound no guarding  MSK: Moving all extremities, no edema. 5/5 strength and full ROM in all extremities.     NEURO: A&Ox3, gait normal, no focal neurological deficits  SKIN: warm, dry, no rash.

## 2021-09-22 NOTE — H&P ADULT - ASSESSMENT
70-year-old male with medical history of GILSON (on nocturna CPAP), Hypothyroidism, BPH, pre-diabetes, and osteoarthritis s/p bilateral knee TKA who presented to the hospital for chest pain. Admitted for Unstable Angina with plan for cardiac catheterization tomorrow. Incidentally found bilateral pulmonary nodules.

## 2021-09-22 NOTE — H&P ADULT - PROBLEM SELECTOR PLAN 1
-Troponin 7--><6  -EKG personally reviewed and with NSR and no ST wave changes  -Initiate Aspirin 81 mg, Atorvastatin 80 mg (pt prev on Simvastatin), Metoprolol 12.5 mg BID  -Plavix load today with maintenance starting tomorrow  -Telemetry monitoring  -CT coronaries also suggestive of pulmonary hypertension (possibly related to GILSON), will follow-up TTE and cardiac catheterization   -Cardiology eval appreciated, plan for cardiac cath tomorrow

## 2021-09-22 NOTE — H&P ADULT - NSHPLABSRESULTS_GEN_ALL_CORE
Personally reviewed old records.  Personally reviewed labs.  Personally reviewed imaging.  Personally reviewed EKG.                          14.2   6.51  )-----------( 313      ( 22 Sep 2021 11:11 )             44.8       09-22    139  |  102  |  14  ----------------------------<  84  4.0   |  24  |  1.00    Ca    9.7      22 Sep 2021 11:11    TPro  7.2  /  Alb  4.6  /  TBili  0.3  /  DBili  x   /  AST  20  /  ALT  15  /  AlkPhos  58  09-22            LIVER FUNCTIONS - ( 22 Sep 2021 11:11 )  Alb: 4.6 g/dL / Pro: 7.2 g/dL / ALK PHOS: 58 U/L / ALT: 15 U/L / AST: 20 U/L / GGT: x             PT/INR - ( 22 Sep 2021 11:11 )   PT: 11.1 sec;   INR: 0.92 ratio         PTT - ( 22 Sep 2021 11:11 )  PTT:31.4 sec        < from: CT Angio Heart and Coronaries w/ IV Cont (09.22.21 @ 15:50) >    IMPRESSION:    1. Agatston Calcium Score: 388 ; Insufficient demographic data to assess WARE percentile.    2. Coronary CTA: Left dominant coronary circulation.  -Moderate stenosis along the mid left coronary artery as described above up to 65% by area just prior to 3rd OM branch.  -Severe stenosis of nondominant right coronary artery secondary to mixed plaque just beyond acute marginal takeoff.  -Additional multifocal mild disease, which nears 50% at the LAD origin.  -Calcified plaque also involves dominant first septal branch, first diagonal branches, distal LAD, and distal LCX prior to PDA branches; difficult to quantify due to small vessel caliber.    3. Prominent lobulated contrast filled structure is seen along the interatrial septum, possibly a prominent leftatrial diverticulum, image 164 series 15. Potential shunt/PFO/VSD is considered less likely given lack of high density contrast in the right atrium.    4. Main pulmonary artery enlargement may reflect pulmonary dural hypertension. Correlate with echocardiogram.    5. Pulmonary nodules measuring up to 0.6 cm of the left upper lobe in subpleural location and 0.3 cm of the right upper lobe adjacent to ill-defined groundglass can be followed with dedicated chest CT in 3 months.    < end of copied text >    < from: Xray Chest 1 View- PORTABLE-Urgent (09.22.21 @ 12:10) >    IMPRESSION:  Clear lungs.    < end of copied text >

## 2021-09-22 NOTE — CONSULT NOTE ADULT - ATTENDING COMMENTS
70 year old man presenting with chest pain, CT of heart and coronaries in ER indicates significant multivessel CAD. Arranged invasive coronary angiogram to be done this morning in anticipation of coronary revascularization.    After our Consult patient admitted to a cardiologist, Dr. Hampton, therefore we will not continue to participate in care.    To contact call Cardiology Fellow or Attending as listed on [email or web address] password: [email or web address].

## 2021-09-22 NOTE — ED ADULT NURSE REASSESSMENT NOTE - NS ED NURSE REASSESS COMMENT FT1
Spoke with Anne in CT to verify time for CT coronary, states patient will be going in about an hour and OK to give lopressor at this time.

## 2021-09-22 NOTE — ED PROVIDER NOTE - NSICDXFAMILYHX_GEN_ALL_CORE_FT
FAMILY HISTORY:  Father  Still living? No  Family history of heart attack, Age at diagnosis: Age Unknown  Family history of stroke, Age at diagnosis: Age Unknown    Mother  Still living? Yes, Estimated age: 81-90  Family history of osteoarthritis, Age at diagnosis: Age Unknown

## 2021-09-22 NOTE — ED ADULT NURSE NOTE - IS THE PATIENT ABLE TO BE SCREENED?
68 year old male with history of DM type II, etoh abuse and pancreatitis, CAD, s/p PCI, PVD admitted with worsening left foot infection     1. CAD s/p PCI  CV stable no chest pain or sob    recent echo revealing normal lv sys fx , minimal MR   recent cath performed in Feb 2018 ( CANDIDO x 1pLAD, CANDIDO x 1 dCx, CANDIDO x 1 pOM1 and RCA )  cont asa 81mg daily/ ticagrelor / BB     2. PAD  await LE bypass  patient is as cardiac optimized as can be expected and can proceed with intermediate cardiac risk   given recent PCI , needs to continue ASA 81 and Ticagrelor without interruption  if definite plans are made for le bypass, will hold brilinta for 5 days preop and plan for surgery at least 30 days post-PCI earliest date 3/9/18      3. Foot ulcer/OM  IV abx  podiatry / vascular followup   ID f/u     dvt ppx Yes

## 2021-09-22 NOTE — H&P ADULT - NSHPPHYSICALEXAM_GEN_ALL_CORE
ICU Vital Signs Last 24 Hrs  T(C): 37 (22 Sep 2021 17:00), Max: 37 (22 Sep 2021 13:59)  T(F): 98.6 (22 Sep 2021 17:00), Max: 98.6 (22 Sep 2021 13:59)  HR: 57 (22 Sep 2021 17:00) (57 - 84)  BP: 131/74 (22 Sep 2021 17:00) (131/74 - 157/88)  BP(mean): --  ABP: --  ABP(mean): --  RR: 18 (22 Sep 2021 17:00) (15 - 18)  SpO2: 95% (22 Sep 2021 17:00) (95% - 100%)

## 2021-09-22 NOTE — ED PROVIDER NOTE - NSICDXPASTMEDICALHX_GEN_ALL_CORE_FT
PAST MEDICAL HISTORY:  BPH (benign prostatic hyperplasia)     Chronic pain of left knee     Hyperlipidemia     Hypothyroid     Sleep apnea nasal mask-

## 2021-09-22 NOTE — ED PROVIDER NOTE - NS ED ROS FT
GENERAL: No fever, chills  EYES: no vision changes, no discharge.   ENT: no difficulty swallowing or speaking   CARDIAC: + chest pain no, SOB, lower extremity swelling  PULMONARY: no cough, SOB  GI: no abdominal pain, n/v/d  : no dysuria, no hematuria  SKIN: no rashes, no ecchymosis  NEURO: no headache, lightheadedness  MSK: No joint pain, myalgia, weakness.

## 2021-09-22 NOTE — ED PROVIDER NOTE - ATTENDING CONTRIBUTION TO CARE
Private Physician Chivo Acevedo  70y male pmh COVID Vaccine+, HLD, hypothyroid, GILSON, Sp Noah TKR, Pt comes to ed c/o chest pain onset 1030p . Pain throbbing/sharp, nonradiating,assocaited with weakness No shortness of breath,nvdc,fc,abd pain, palps, loc/dizziness,diphoresis. Now resolved. PE WDWN nad normocephalic atraumatic neck supple chest clear anterior & posterior cv no rubs, gallops or murmurs abd soft +bs  no mass guarding neruo no focal defects  Wilson Pena MD, Facep

## 2021-09-22 NOTE — ED PROVIDER NOTE - PROGRESS NOTE DETAILS
Endorsed to Dr TYSHAWN Pena MD, Facep Erika Jackson M.D. Resident  CT coronary shows moderate to severe stenosis of multiple coronaries. Cards consulted. Cards fellow will see patient. Erika Jackson M.D. Resident   Cards plan to cath tomorrow. Pt has no cardiologist, PMD admits to Dr. Hampton.

## 2021-09-22 NOTE — ED PROVIDER NOTE - OBJECTIVE STATEMENT
71 yo M hx of 69 yo M hx of HLD, hypothyroid, GILSON, diet controlled diabetes, pw chest pain x 1 day, now resolved. At 1030p last night, pt had acute onset left retrosternal chest pain, non exertional, non pleuritic, sharp stabbing, lasting ~1 hour, no radiation, no associated SOB, diaphoresis, nausea, vomiting. . This AM pt spoke to his pmd and pmd recommended pt present to ED. In the ED, pt is asymtpmatic, CP free.

## 2021-09-22 NOTE — H&P ADULT - HISTORY OF PRESENT ILLNESS
Chief Complaint: Chest pain    HPI: 70-year-old male with medical history of GILSON (on nocturna CPAP), Hypothyroidism, BPH, pre-diabetes, and osteoarthritis s/p bilateral knee TKA who presented to the hospital for chest pain. He stated the chest pain started last night as he was lying in bed around 10:30pm. He described it as intermittent, sharp, non-radiating, exacerbated with deep breaths, and without alleviating factors. He denied associated shortness of breath, diaphoresis, lightheadedness/dizziness. He has a headache which started today. He also mentioned he has had abdominal distension and weight gain of about 10 lbs over the past couple months despite exercising (tries to walk at least 1 mile per day).    He denies a personal history of CVA or MI. He has never had a cardiac catheterization in the past. He is a former smoker who quit 24 years ago but smoked 5-6 cigarettes/day for 5 years. Endorses former alcohol use but quit in 1995 due to his noah in god. Family history of MI in his father at age 70 years.

## 2021-09-22 NOTE — H&P ADULT - PROBLEM SELECTOR PLAN 3
-Pt with 10 lb weight gain and abdominal distension over past couple months despite exercise. Possibly related to hypothyroidism vs. abd pathology (ascites?- less likely)  -Will check TSH (pt on Synthroid) and abd US (slight fluid wave palpated on exam).

## 2021-09-22 NOTE — ED ADULT NURSE NOTE - OBJECTIVE STATEMENT
71 yo male with a PMH of HTN, HLD, BPH, hypothyroid presents to the ED via waiting room from home ambulatory with steady gait complaining of chest pain. States that chest pain started at 2230 last night when he was trying to go to bed. Describing as midsternal, L chest, non-radiating sharp constant pain. States he didn't come in last night because he did not want to worry his wife. This morning, pain persisted and patient called his PCP and was advised to come to the ER for further evaluation. Patient is endorsing "mild pain" at this time. Placed on cardiac monitor, MD Pena at bedside evaluating patient. Denies headache, dizziness, vision changes, shortness of breath, abdominal pain, nausea, vomiting, diarrhea, fevers, chills, dysuria, hematuria, recent illness travel or fall.

## 2021-09-22 NOTE — H&P ADULT - PROBLEM SELECTOR PLAN 2
-Incidental pulm nodules- 0.6 cm LY, 0.3 cm RUL  -Former smoker (see soc hx)  -Repeat CT in 3 months to follow-up. Informed patient of these findings.

## 2021-09-22 NOTE — ED PROVIDER NOTE - CLINICAL SUMMARY MEDICAL DECISION MAKING FREE TEXT BOX
69 yo M hx of HLD, hypothyroid, GILSON, diet controlled diabetes, pw chest pain x 1 day, now resolved. EKG normal sinus. Possible ACS. chest pain workup, coronary CT vs CDU for stress/echo.

## 2021-09-22 NOTE — ED ADULT NURSE NOTE - NSIMPLEMENTINTERV_GEN_ALL_ED
Implemented All Universal Safety Interventions:  [place] to call system. Call bell, personal items and telephone within reach. Instruct patient to call for assistance. Room bathroom lighting operational. Non-slip footwear when patient is off stretcher. Physically safe environment: no spills, clutter or unnecessary equipment. Stretcher in lowest position, wheels locked, appropriate side rails in place.

## 2021-09-22 NOTE — H&P ADULT - PROBLEM SELECTOR PLAN 7
-Pt unaware of CPAP settings. He is going to call spouse to see if she is aware  -CPAP ordered; RT notified via order in EMR

## 2021-09-22 NOTE — H&P ADULT - NSHPSOCIALHISTORY_GEN_ALL_CORE
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

## 2021-09-23 ENCOUNTER — TRANSCRIPTION ENCOUNTER (OUTPATIENT)
Age: 71
End: 2021-09-23

## 2021-09-23 VITALS
OXYGEN SATURATION: 95 % | RESPIRATION RATE: 16 BRPM | SYSTOLIC BLOOD PRESSURE: 129 MMHG | DIASTOLIC BLOOD PRESSURE: 97 MMHG | HEART RATE: 74 BPM

## 2021-09-23 LAB
A1C WITH ESTIMATED AVERAGE GLUCOSE RESULT: 6 % — HIGH (ref 4–5.6)
ANION GAP SERPL CALC-SCNC: 12 MMOL/L — SIGNIFICANT CHANGE UP (ref 5–17)
BASOPHILS # BLD AUTO: 0.05 K/UL — SIGNIFICANT CHANGE UP (ref 0–0.2)
BASOPHILS NFR BLD AUTO: 0.8 % — SIGNIFICANT CHANGE UP (ref 0–2)
BUN SERPL-MCNC: 16 MG/DL — SIGNIFICANT CHANGE UP (ref 7–23)
CALCIUM SERPL-MCNC: 9.5 MG/DL — SIGNIFICANT CHANGE UP (ref 8.4–10.5)
CHLORIDE SERPL-SCNC: 101 MMOL/L — SIGNIFICANT CHANGE UP (ref 96–108)
CHOLEST SERPL-MCNC: 155 MG/DL — SIGNIFICANT CHANGE UP
CO2 SERPL-SCNC: 26 MMOL/L — SIGNIFICANT CHANGE UP (ref 22–31)
COVID-19 SPIKE DOMAIN AB INTERP: POSITIVE
COVID-19 SPIKE DOMAIN ANTIBODY RESULT: 89.9 U/ML — HIGH
CREAT SERPL-MCNC: 1.06 MG/DL — SIGNIFICANT CHANGE UP (ref 0.5–1.3)
EOSINOPHIL # BLD AUTO: 0.36 K/UL — SIGNIFICANT CHANGE UP (ref 0–0.5)
EOSINOPHIL NFR BLD AUTO: 5.9 % — SIGNIFICANT CHANGE UP (ref 0–6)
ESTIMATED AVERAGE GLUCOSE: 126 MG/DL — HIGH (ref 68–114)
GLUCOSE SERPL-MCNC: 105 MG/DL — HIGH (ref 70–99)
HCT VFR BLD CALC: 45.4 % — SIGNIFICANT CHANGE UP (ref 39–50)
HDLC SERPL-MCNC: 38 MG/DL — LOW
HGB BLD-MCNC: 14.4 G/DL — SIGNIFICANT CHANGE UP (ref 13–17)
IMM GRANULOCYTES NFR BLD AUTO: 0.3 % — SIGNIFICANT CHANGE UP (ref 0–1.5)
LIPID PNL WITH DIRECT LDL SERPL: 85 MG/DL — SIGNIFICANT CHANGE UP
LYMPHOCYTES # BLD AUTO: 1.43 K/UL — SIGNIFICANT CHANGE UP (ref 1–3.3)
LYMPHOCYTES # BLD AUTO: 23.3 % — SIGNIFICANT CHANGE UP (ref 13–44)
MCHC RBC-ENTMCNC: 27.2 PG — SIGNIFICANT CHANGE UP (ref 27–34)
MCHC RBC-ENTMCNC: 31.7 GM/DL — LOW (ref 32–36)
MCV RBC AUTO: 85.7 FL — SIGNIFICANT CHANGE UP (ref 80–100)
MONOCYTES # BLD AUTO: 0.64 K/UL — SIGNIFICANT CHANGE UP (ref 0–0.9)
MONOCYTES NFR BLD AUTO: 10.4 % — SIGNIFICANT CHANGE UP (ref 2–14)
NEUTROPHILS # BLD AUTO: 3.64 K/UL — SIGNIFICANT CHANGE UP (ref 1.8–7.4)
NEUTROPHILS NFR BLD AUTO: 59.3 % — SIGNIFICANT CHANGE UP (ref 43–77)
NON HDL CHOLESTEROL: 117 MG/DL — SIGNIFICANT CHANGE UP
NRBC # BLD: 0 /100 WBCS — SIGNIFICANT CHANGE UP (ref 0–0)
PLATELET # BLD AUTO: 291 K/UL — SIGNIFICANT CHANGE UP (ref 150–400)
POTASSIUM SERPL-MCNC: 3.8 MMOL/L — SIGNIFICANT CHANGE UP (ref 3.5–5.3)
POTASSIUM SERPL-SCNC: 3.8 MMOL/L — SIGNIFICANT CHANGE UP (ref 3.5–5.3)
RBC # BLD: 5.3 M/UL — SIGNIFICANT CHANGE UP (ref 4.2–5.8)
RBC # FLD: 13.8 % — SIGNIFICANT CHANGE UP (ref 10.3–14.5)
SARS-COV-2 IGG+IGM SERPL QL IA: 89.9 U/ML — HIGH
SARS-COV-2 IGG+IGM SERPL QL IA: POSITIVE
SODIUM SERPL-SCNC: 139 MMOL/L — SIGNIFICANT CHANGE UP (ref 135–145)
TRIGL SERPL-MCNC: 160 MG/DL — HIGH
TSH SERPL-MCNC: 4.88 UIU/ML — HIGH (ref 0.27–4.2)
WBC # BLD: 6.14 K/UL — SIGNIFICANT CHANGE UP (ref 3.8–10.5)
WBC # FLD AUTO: 6.14 K/UL — SIGNIFICANT CHANGE UP (ref 3.8–10.5)

## 2021-09-23 PROCEDURE — 85379 FIBRIN DEGRADATION QUANT: CPT

## 2021-09-23 PROCEDURE — 84443 ASSAY THYROID STIM HORMONE: CPT

## 2021-09-23 PROCEDURE — 93454 CORONARY ARTERY ANGIO S&I: CPT | Mod: 26

## 2021-09-23 PROCEDURE — 86769 SARS-COV-2 COVID-19 ANTIBODY: CPT

## 2021-09-23 PROCEDURE — 83036 HEMOGLOBIN GLYCOSYLATED A1C: CPT

## 2021-09-23 PROCEDURE — 93458 L HRT ARTERY/VENTRICLE ANGIO: CPT

## 2021-09-23 PROCEDURE — 80053 COMPREHEN METABOLIC PANEL: CPT

## 2021-09-23 PROCEDURE — U0003: CPT

## 2021-09-23 PROCEDURE — 85025 COMPLETE CBC W/AUTO DIFF WBC: CPT

## 2021-09-23 PROCEDURE — 71045 X-RAY EXAM CHEST 1 VIEW: CPT

## 2021-09-23 PROCEDURE — 93306 TTE W/DOPPLER COMPLETE: CPT

## 2021-09-23 PROCEDURE — U0005: CPT

## 2021-09-23 PROCEDURE — 84484 ASSAY OF TROPONIN QUANT: CPT

## 2021-09-23 PROCEDURE — 93306 TTE W/DOPPLER COMPLETE: CPT | Mod: 26,77

## 2021-09-23 PROCEDURE — 85610 PROTHROMBIN TIME: CPT

## 2021-09-23 PROCEDURE — 93454 CORONARY ARTERY ANGIO S&I: CPT

## 2021-09-23 PROCEDURE — 99152 MOD SED SAME PHYS/QHP 5/>YRS: CPT

## 2021-09-23 PROCEDURE — 80048 BASIC METABOLIC PNL TOTAL CA: CPT

## 2021-09-23 PROCEDURE — 99285 EMERGENCY DEPT VISIT HI MDM: CPT

## 2021-09-23 PROCEDURE — 76705 ECHO EXAM OF ABDOMEN: CPT

## 2021-09-23 PROCEDURE — 75574 CT ANGIO HRT W/3D IMAGE: CPT | Mod: MA

## 2021-09-23 PROCEDURE — 80061 LIPID PANEL: CPT

## 2021-09-23 PROCEDURE — 94660 CPAP INITIATION&MGMT: CPT

## 2021-09-23 PROCEDURE — 76705 ECHO EXAM OF ABDOMEN: CPT | Mod: 26

## 2021-09-23 PROCEDURE — 86803 HEPATITIS C AB TEST: CPT

## 2021-09-23 PROCEDURE — 99223 1ST HOSP IP/OBS HIGH 75: CPT | Mod: GC

## 2021-09-23 PROCEDURE — 85730 THROMBOPLASTIN TIME PARTIAL: CPT

## 2021-09-23 PROCEDURE — 93306 TTE W/DOPPLER COMPLETE: CPT | Mod: 26

## 2021-09-23 RX ORDER — ASPIRIN/CALCIUM CARB/MAGNESIUM 324 MG
1 TABLET ORAL
Qty: 0 | Refills: 0 | DISCHARGE
Start: 2021-09-23 | End: 2021-11-21

## 2021-09-23 RX ORDER — ASPIRIN/CALCIUM CARB/MAGNESIUM 324 MG
1 TABLET ORAL
Qty: 60 | Refills: 0
Start: 2021-09-23 | End: 2021-11-21

## 2021-09-23 RX ORDER — ATORVASTATIN CALCIUM 80 MG/1
1 TABLET, FILM COATED ORAL
Qty: 1 | Refills: 0
Start: 2021-09-23 | End: 2021-10-22

## 2021-09-23 RX ORDER — ATORVASTATIN CALCIUM 80 MG/1
1 TABLET, FILM COATED ORAL
Qty: 30 | Refills: 0
Start: 2021-09-23 | End: 2021-10-22

## 2021-09-23 RX ORDER — ACETAMINOPHEN 500 MG
650 TABLET ORAL ONCE
Refills: 0 | Status: DISCONTINUED | OUTPATIENT
Start: 2021-09-23 | End: 2021-09-23

## 2021-09-23 RX ADMIN — Medication 30 MILLILITER(S): at 00:20

## 2021-09-23 RX ADMIN — Medication 50 MICROGRAM(S): at 06:05

## 2021-09-23 NOTE — DISCHARGE NOTE PROVIDER - CARE PROVIDER_API CALL
Juan Hampton)  Cardiovascular Disease  [address]  [place] [address]  Phone: [phone]  Fax: [phone]  Follow Up Time:    Preet Maynard)  Interventional Cardiology  [address] [facility], [address]  [place] [address]  Phone: [phone]  Fax: [phone]  Follow Up Time:

## 2021-09-23 NOTE — DISCHARGE NOTE PROVIDER - NSDCFUADDINST_GEN_ALL_CORE_FT
Wound Care:   the day AFTER your procedure remove bandage GENTLY, and clean using  mild soap and gentle warm, water stream, pat dry. leave OPEN to air. YOU MAY SHOWER   DO NOT apply lotions, creams, ointments, powder, parfumes to your incision site  DO NOT SOAK your site for 1 week ( no baths, no pools, no tubs, etc...)  Check  your groin and /or wrist  daily.A small amount of bruising, and soreness are normal    ACTIVITY: for 24 hours   - DO NOT DRIVE  - DO NOT make any important decisions or sign legal documents   - DO NOT operate heavy machinaries   - you may resume sexual activity in 48 hours, unless otherwise instructed by your cardiologist     If your procedure was done through the WRIST: for the NEXT 3DAYS:  - avoid pushing, pulling, with that affected wrist   - avoid repeated movement of that hand and wrist ( eg: typing, hammering)  - DO NOT LIFT anything more than 5 lbs     If your procedure was done through the GROIN: for the NEXT 5 DAYS  - Limit climbing stairs, DO NOT soak in bathtub or pool  - no strenous activities, pushing, pulling, straining  - Do not lift anything 10lbs or heavier     MEDICATION:   take your medications as explained ( see discharge paperwork)       Follow heart healthy diet recommended by your doctor, , if you smoke STOP SMOKING ( may call [phone] for center of tobacco control if you need assistance)     CALL your doctor to make appointment in 2 WEEKS     ***CALL YOUR DOCTOR***  if you experience: fever, chills, body aches, or severe pain, swelling, redness, heat or yellow discharge at incision site  If you experience Bleeding or excruciating pain at the procedural site, sweliing ( golf ball size) at your procedural site  If you experience CHEST PAIN  If you experience extremity numbness, tingling, temperature change ( of your procedural site)   If you are unable to reach your doctor, you may contact:   -Cardiology Office at [facility] at [phone] or   - [facility] [phone]  - [facility] [phone]

## 2021-09-23 NOTE — DISCHARGE NOTE NURSING/CASE MANAGEMENT/SOCIAL WORK - NSDCVIVACCINE_GEN_ALL_CORE_FT
influenza, injectable, quadrivalent, preservative free; 26-Oct-2018 15:35; Jovana Bolden (RN); [employer]; [id number] (Exp. Date: 30-Jun-2019); IntraMuscular; Deltoid Right.; 0.5 milliLiter(s); VIS (VIS Published: 07-Aug-2015, VIS Presented: 26-Oct-2018);

## 2021-09-23 NOTE — DISCHARGE NOTE PROVIDER - NSDCCPTREATMENT_GEN_ALL_CORE_FT
PRINCIPAL PROCEDURE  Procedure: Left heart catheterization  Findings and Treatment:       SECONDARY PROCEDURE  Procedure: Transthoracic echocardiogram  Findings and Treatment:

## 2021-09-23 NOTE — CONSULT NOTE ADULT - ASSESSMENT
71 yo M w/ PMH hypothyroid, HLD, BPH, GILSON on CPAP who presents with chest pain. Patient had constant chest pain 8-9/10 last night on the left side of chest now resolved.     #Chest pain  #Unstable angina.   EKG unremarkable. CXRAY wnl. CTA shows multivessel disease. Trop negative. Likely UA causing chest pain. Low suspicion for dissection or PE.   -Start plavix load and continue maintenance   -Continue aspirin 81mg daily   -Start atorvastatin 80mg bedtime   -Start metoprolol 12.5mg BID   -Plan for cath tomorrow 
Patient seen and examined, agree with above assessment and plan as transcribed above.    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

## 2021-09-23 NOTE — DISCHARGE NOTE PROVIDER - HOSPITAL COURSE
HPI:  Chief Complaint: Chest pain    HPI: 70-year-old male with medical history of GILSON (on nocturna CPAP), Hypothyroidism, BPH, pre-diabetes, and osteoarthritis s/p bilateral knee TKA who presented to the hospital for chest pain. He stated the chest pain started last night as he was lying in bed around 10:30pm. He described it as intermittent, sharp, non-radiating, exacerbated with deep breaths, and without alleviating factors. He denied associated shortness of breath, diaphoresis, lightheadedness/dizziness. He has a headache which started today. He also mentioned he has had abdominal distension and weight gain of about 10 lbs over the past couple months despite exercising (tries to walk at least 1 mile per day).    He denies a personal history of CVA or MI. He has never had a cardiac catheterization in the past. He is a former smoker who quit 24 years ago but smoked 5-6 cigarettes/day for 5 years. Endorses former alcohol use but quit in 1995 due to his noah in god. Family history of MI in his father at age 70 years.  (22 Sep 2021 21:37)    9/23 s/p [facility]  prelim report dCX 40%  pRCA 20%  Awaiting TTE   HPI:  Chief Complaint: Chest pain    HPI: 70-year-old male with medical history of GILSON (on nocturna CPAP), Hypothyroidism, BPH, pre-diabetes, and osteoarthritis s/p bilateral knee TKA who presented to the hospital for chest pain. He stated the chest pain started last night as he was lying in bed around 10:30pm. He described it as intermittent, sharp, non-radiating, exacerbated with deep breaths, and without alleviating factors. He denied associated shortness of breath, diaphoresis, lightheadedness/dizziness. He has a headache which started today. He also mentioned he has had abdominal distension and weight gain of about 10 lbs over the past couple months despite exercising (tries to walk at least 1 mile per day).    He denies a personal history of CVA or MI. He has never had a cardiac catheterization in the past. He is a former smoker who quit 24 years ago but smoked 5-6 cigarettes/day for 5 years. Endorses former alcohol use but quit in 1995 due to his noah in god. Family history of MI in his father at age 70 years.  (22 Sep 2021 21:37)  Troponins negative     9/23 s/p [facility]  prelim report dCX 40%  pRCA 20%.  RRA site benign- no hematoma or bleeding +radial pulse intact. Cap refill < 2 secs  Awaiting TTE   HPI:  Chief Complaint: Chest pain    HPI: 70-year-old male with medical history of GILSON (on nocturna CPAP), Hypothyroidism, BPH, pre-diabetes, and osteoarthritis s/p bilateral knee TKA who presented to the hospital for chest pain. He stated the chest pain started last night as he was lying in bed around 10:30pm. He described it as intermittent, sharp, non-radiating, exacerbated with deep breaths, and without alleviating factors. He denied associated shortness of breath, diaphoresis, lightheadedness/dizziness. He has a headache which started today. He also mentioned he has had abdominal distension and weight gain of about 10 lbs over the past couple months despite exercising (tries to walk at least 1 mile per day).    He denies a personal history of CVA or MI. He has never had a cardiac catheterization in the past. He is a former smoker who quit 24 years ago but smoked 5-6 cigarettes/day for 5 years. Endorses former alcohol use but quit in 1995 due to his noah in god. Family history of MI in his father at age 70 years.  (22 Sep 2021 21:37)  Troponins negative     9/23 s/p [facility]  prelim report dCX 40%  pRCA 20%.  RRA site benign- no hematoma or bleeding +radial pulse intact. Cap refill < 2 secs  Awaiting TTE    Seen by Dr Hampton      HPI:  Chief Complaint: Chest pain    HPI: 70-year-old male with medical history of GILSON (on nocturna CPAP), Hypothyroidism, BPH, pre-diabetes, and osteoarthritis s/p bilateral knee TKA who presented to the hospital for chest pain. He stated the chest pain started last night as he was lying in bed around 10:30pm. He described it as intermittent, sharp, non-radiating, exacerbated with deep breaths, and without alleviating factors. He denied associated shortness of breath, diaphoresis, lightheadedness/dizziness. He has a headache which started today. He also mentioned he has had abdominal distension and weight gain of about 10 lbs over the past couple months despite exercising (tries to walk at least 1 mile per day).    He denies a personal history of CVA or MI. He has never had a cardiac catheterization in the past. He is a former smoker who quit 24 years ago but smoked 5-6 cigarettes/day for 5 years. Endorses former alcohol use but quit in 1995 due to his noah in god. Family history of MI in his father at age 70 years.  (22 Sep 2021 21:37)  Troponins negative     9/23 s/p [facility]  prelim report dCX 40%  pRCA 20%.  RRA site benign- no hematoma or bleeding +radial pulse intact. Cap refill < 2 secs  TTE   1. Normal left ventricular internal dimensions and wall thicknesses.  2. Normal left ventricular systolic function. No segmental wall motion abnormalities.  3. Mild diastolic dysfunction (Stage I).  4. Normal right ventricular size and function.    Seen by Dr Hampton      HPI:  Chief Complaint: Chest pain    HPI: 70-year-old male with medical history of GILSON (on nocturna CPAP), Hypothyroidism, BPH, pre-diabetes, and osteoarthritis s/p bilateral knee TKA who presented to the hospital for chest pain. He stated the chest pain started last night as he was lying in bed around 10:30pm. He described it as intermittent, sharp, non-radiating, exacerbated with deep breaths, and without alleviating factors. He denied associated shortness of breath, diaphoresis, lightheadedness/dizziness. He has a headache which started today. He also mentioned he has had abdominal distension and weight gain of about 10 lbs over the past couple months despite exercising (tries to walk at least 1 mile per day).    He denies a personal history of CVA or MI. He has never had a cardiac catheterization in the past. He is a former smoker who quit 24 years ago but smoked 5-6 cigarettes/day for 5 years. Endorses former alcohol use but quit in 1995 due to his noah in god. Family history of MI in his father at age 70 years.  (22 Sep 2021 21:37)  Troponins negative     9/23 s/p [facility]  prelim report dCX 40%  pRCA 20%.  RRA site benign- no hematoma or bleeding +radial pulse intact. Cap refill < 2 secs  TTE   1. Normal left ventricular internal dimensions and wall thicknesses.  2. Normal left ventricular systolic function. No segmental wall motion abnormalities.  3. Mild diastolic dysfunction (Stage I).  4. Normal right ventricular size and function.    Seen by Dr Hampton and cleared for d/c home   Teaching done. Follow up with Dr Maynard in 1-2 weeks      HPI:  Chief Complaint: Chest pain    HPI: 70-year-old male with medical history of GILSON (on nocturna CPAP), Hypothyroidism, BPH, pre-diabetes, and osteoarthritis s/p bilateral knee TKA who presented to the hospital for chest pain. He stated the chest pain started last night as he was lying in bed around 10:30pm. He described it as intermittent, sharp, non-radiating, exacerbated with deep breaths, and without alleviating factors. He denied associated shortness of breath, diaphoresis, lightheadedness/dizziness. He has a headache which started today. He also mentioned he has had abdominal distension and weight gain of about 10 lbs over the past couple months despite exercising (tries to walk at least 1 mile per day).    He denies a personal history of CVA or MI. He has never had a cardiac catheterization in the past. He is a former smoker who quit 24 years ago but smoked 5-6 cigarettes/day for 5 years. Endorses former alcohol use but quit in 1995 due to his noah in god. Family history of MI in his father at age 70 years.  (22 Sep 2021 21:37)  Troponins negative     9/23 s/p [facility]  prelim report dCX 40%  pRCA 20%.  RRA site benign- no hematoma or bleeding +radial pulse intact. Cap refill < 2 secs  TTE   1. Normal left ventricular internal dimensions and wall thicknesses.  2. Normal left ventricular systolic function. No segmental wall motion abnormalities.  3. Mild diastolic dysfunction (Stage I).  4. Normal right ventricular size and function.  Bubble study  Agitated saline injection and color flow Doppler demonstrates no evidence of a patent foramen ovale or ASD.  No VSD seen.    Seen by Dr Hampton and cleared for d/c home   Teaching done. Follow up with Dr Maynard in 1-2 weeks      HPI:  Chief Complaint: Chest pain    HPI: 70-year-old male with medical history of GILSON (on nocturna CPAP), Hypothyroidism, BPH, pre-diabetes, and osteoarthritis s/p bilateral knee TKA who presented to the hospital for chest pain. He stated the chest pain started last night as he was lying in bed around 10:30pm. He described it as intermittent, sharp, non-radiating, exacerbated with deep breaths, and without alleviating factors. He denied associated shortness of breath, diaphoresis, lightheadedness/dizziness. He has a headache which started today. He also mentioned he has had abdominal distension and weight gain of about 10 lbs over the past couple months despite exercising (tries to walk at least 1 mile per day).    He denies a personal history of CVA or MI. He has never had a cardiac catheterization in the past. He is a former smoker who quit 24 years ago but smoked 5-6 cigarettes/day for 5 years. Endorses former alcohol use but quit in 1995 due to his noah in god. Family history of MI in his father at age 70 years.  (22 Sep 2021 21:37)  Troponins negative     9/23 s/p [facility]  prelim report dCX 40%  pRCA 20%.  RRA site benign- no hematoma or bleeding +radial pulse intact. Cap refill < 2 secs  TTE   1. Normal left ventricular internal dimensions and wall thicknesses.  2. Normal left ventricular systolic function. No segmental wall motion abnormalities.  3. Mild diastolic dysfunction (Stage I).  4. Normal right ventricular size and function.  Bubble study  Agitated saline injection and color flow Doppler demonstrates no evidence of a patent foramen ovale or ASD.  No VSD seen.    Seen by Dr Hampton and cleared for d/c home.   Continue ASA 81 mg daily and d/c simvastatin. Start atorvastatin 40 mg HS.   Teaching done. Follow up with Dr Maynard in 1-2 weeks

## 2021-09-23 NOTE — DISCHARGE NOTE PROVIDER - NSDCCPCAREPLAN_GEN_ALL_CORE_FT
PRINCIPAL DISCHARGE DIAGNOSIS  Diagnosis: Unstable angina pectoris  Assessment and Plan of Treatment:

## 2021-09-23 NOTE — CONSULT NOTE ADULT - SUBJECTIVE AND OBJECTIVE BOX
Patient seen and evaluated at bedside    Chief Complaint:    HPI:    69 yo M w/ PMH hypothyroid, HLD, BPH, GILSON on CPAP who presents with chest pain. Patient had constant chest pain 8-9/10 last night on the left side of chest. Did not radiate. Said he never had chest pain before. He was able to go to sleep but in the morning, was still having chest pain which was improved but still constant. Therefore, he decided to go to the ED. Since he came to the ED, his chest pain resolved and became intermittent. Gets worse with exertion per patient. Denied fevers, chills, palpitations, SOB, abd pain, NV. Takes levothyroxine simvastatin, aspirin, and tamsulosin at home. He denied any cardiac hx. He reports being pre-diabetic, no hx of HTN. Father passed away from MI. Denied smoking, drinking, drug use.     EKG shows sinus rhythm, no ST changes or T wave inversions.       PMHx:   Hypothyroid    Hyperlipidemia    BPH (benign prostatic hyperplasia)    Sleep apnea    Chronic pain of left knee        PSHx:   No significant past surgical history    Status post left knee replacement        Allergies:  No Known Allergies      Home Meds:    Current Medications:       FAMILY HISTORY:  Family history of heart attack (Father)    Family history of stroke (Father)    Family history of osteoarthritis (Mother)        Social History:  Smoking History:  Alcohol Use:  Drug Use:    REVIEW OF SYSTEMS:  Constitutional:     [x ] negative [ ] fevers [ ] chills [ ] weight loss [ ] weight gain  HEENT:                  [x ] negative [ ] dry eyes [ ] eye irritation [ ] postnasal drip [ ] nasal congestion  CV:                         [ x] negative  [ ] chest pain [ ] orthopnea [ ] palpitations [ ] murmur  Resp:                     [x ] negative [ ] cough [ ] shortness of breath [ ] dyspnea [ ] wheezing [ ] sputum [ ]hemoptysis  GI:                          [ x] negative [ ] nausea [ ] vomiting [ ] diarrhea [ ] constipation [ ] abd pain [ ] dysphagia   :                        [ x] negative [ ] dysuria [ ] nocturia [ ] hematuria [ ] increased urinary frequency  Musculoskeletal: [x ] negative [ ] back pain [ ] myalgias [ ] arthralgias [ ] fracture  Skin:                       [ x] negative [ ] rash [ ] itch  Neurological:        [ x] negative [ ] headache [ ] dizziness [ ] syncope [ ] weakness [ ] numbness  Psychiatric:           [ x] negative [ ] anxiety [ ] depression  Endocrine:            [ x] negative [ ] diabetes [ ] thyroid problem  Heme/Lymph:      [ x] negative [ ] anemia [ ] bleeding problem  Allergic/Immune: [ x] negative [ ] itchy eyes [ ] nasal discharge [ ] hives [ ] angioedema    [ x] All other systems negative  [ ] Unable to assess ROS due to      Physical Exam:  T(F): 98.6 (09-22), Max: 98.6 (09-22)  HR: 57 (09-22) (57 - 84)  BP: 131/74 (09-22) (131/74 - 157/88)  RR: 18 (09-22)  SpO2: 95% (09-22)  GENERAL: No acute distress, well-developed  HEAD:  Atraumatic, Normocephalic  ENT: EOMI, PERRLA, conjunctiva and sclera clear, Neck supple, No JVD, moist mucosa  CHEST/LUNG: Clear to auscultation bilaterally; No wheeze, equal breath sounds bilaterally   HEART: Regular rate and rhythm; No murmurs, rubs, or gallops  ABDOMEN: Soft, Nontender, Nondistended   EXTREMITIES:  No clubbing, cyanosis, or edema  PSYCH: Nl behavior, nl affect  NEUROLOGY: AAOx3, non-focal   SKIN: Normal color, No rashes or lesions  LINES:    Cardiovascular Diagnostic Testing:    ECG: Personally reviewed:    Echo: Personally reviewed:    Stress Testing:    Cath:    Imaging:    CXR: Personally reviewed    Labs: Personally reviewed                        14.2   6.51  )-----------( 313      ( 22 Sep 2021 11:11 )             44.8     09-22    139  |  102  |  14  ----------------------------<  84  4.0   |  24  |  1.00    Ca    9.7      22 Sep 2021 11:11    TPro  7.2  /  Alb  4.6  /  TBili  0.3  /  DBili  x   /  AST  20  /  ALT  15  /  AlkPhos  58  09-22    PT/INR - ( 22 Sep 2021 11:11 )   PT: 11.1 sec;   INR: 0.92 ratio         PTT - ( 22 Sep 2021 11:11 )  PTT:31.4 sec        
C A R D I O L O G Y  *********************    DATE OF SERVICE: 09-23-21    HISTORY OF PRESENT ILLNESS: HPI:  Patient is a 71 y/o male with PMH of GILSON (on nocturnal CPAP), Hypothyroidism, BPH, pre-diabetes, and osteoarthritis s/p bilateral knee TKA who presented with chest pain admitted with unstable angina found to have multivessel CAD on CT Cors. Cardiology consulted for further evaluation. He stated the chest pain started 2 nights ago as he was lying in bed around 10:30pm. He described it as intermittent, sharp, non-radiating, exacerbated with deep breaths, and without alleviating factors. He denies a personal history of CVA or MI. He has never had a cardiac catheterization in the past. He is a former smoker who quit 24 years ago but smoked 5-6 cigarettes/day for 5 years. Endorses former alcohol use but quit in 1995 due to his noah in god. Family history of MI in his father at age 70 years. Denies further chest pain, SOB, palpitations, dizziness, or syncope.      PAST MEDICAL & SURGICAL HISTORY:  Hypothyroid    Hyperlipidemia    BPH (benign prostatic hyperplasia)    Sleep apnea  nasal mask-    Chronic pain of left knee    Status post left knee replacement  2016      MEDICATIONS:  MEDICATIONS  (STANDING):  acetaminophen   Tablet .. 650 milliGRAM(s) Oral once  aspirin enteric coated 81 milliGRAM(s) Oral daily  atorvastatin 80 milliGRAM(s) Oral at bedtime  clopidogrel Tablet 75 milliGRAM(s) Oral daily  finasteride 5 milliGRAM(s) Oral daily  levothyroxine 50 MICROGram(s) Oral daily  metoprolol tartrate 12.5 milliGRAM(s) Oral two times a day  tamsulosin 0.4 milliGRAM(s) Oral at bedtime      Allergies    No Known Allergies    Intolerances        FAMILY HISTORY:  Family history of heart attack (Father)    Family history of stroke (Father)    Family history of osteoarthritis (Mother)      Non-contributary for premature coronary disease or sudden cardiac death    SOCIAL HISTORY:    [ x] Non-smoker  [ ] Smoker  [ ] Alcohol    FLU VACCINE THIS YEAR STARTS IN AUGUST:  [ ] Yes    [ ] No    IF OVER 65 HAVE YOU EVER HAD A PNA VACCINE:  [ ] Yes    [ ] No       [ ] N/A      REVIEW OF SYSTEMS:  [x ]chest pain  [  ]shortness of breath  [  ]palpitations  [  ]syncope  [ ]near syncope [ ]upper extremity weakness   [ ] lower extremity weakness  [  ]diplopia  [  ]altered mental status   [  ]fevers  [ ]chills [ ]nausea  [ ]vomitting  [  ]dysphagia    [ ]abdominal pain  [ ]melena  [ ]BRBPR    [  ]epistaxis  [  ]rash    [ ]lower extremity edema        [X] All others negative	  [ ] Unable to obtain      LABS:	 	    CARDIAC MARKERS:                              14.4   6.14  )-----------( 291      ( 23 Sep 2021 07:08 )             45.4     Hb Trend: 14.4<--    09-23    139  |  101  |  16  ----------------------------<  105<H>  3.8   |  26  |  1.06    Ca    9.5      23 Sep 2021 07:08    TPro  7.2  /  Alb  4.6  /  TBili  0.3  /  DBili  x   /  AST  20  /  ALT  15  /  AlkPhos  58  09-22    Creatinine Trend: 1.06<--, 1.00<--    Coags:      proBNP:   Lipid Profile:   HgA1c:   TSH: Thyroid Stimulating Hormone, Serum: 4.88 uIU/mL (09-23 @ 10:20)          PHYSICAL EXAM:  T(C): 36.6 (09-23-21 @ 09:50), Max: 37 (09-22-21 @ 17:00)  HR: 79 (09-23-21 @ 13:40) (53 - 87)  BP: 127/73 (09-23-21 @ 13:40) (127/73 - 191/92)  RR: 16 (09-23-21 @ 13:40) (16 - 18)  SpO2: 94% (09-23-21 @ 13:40) (94% - 100%)  Wt(kg): --   BMI (kg/m2): 30.1 (09-23-21 @ 09:50)  I&O's Summary      Gen: Appears well in NAD  HEENT:  (-)icterus (-)pallor  CV: N S1 S2 1/6 DEBI (+)2 Pulses B/l  Resp:  Clear to auscultation B/L, normal effort  GI: (+) BS Soft, NT, ND  Lymph:  (-)Edema, (-)obvious lymphadenopathy  Skin: Warm to touch, Normal turgor  Psych: Appropriate mood and affect      TELEMETRY: SR 80 	      ECG: NSR, no acute ST-T Changes    RADIOLOGY:         CXR: Clear Lungs    ASSESSMENT/PLAN: Patient is a 71 y/o male with PMH of GILSON (on nocturnal CPAP), Hypothyroidism, BPH, pre-diabetes, and osteoarthritis s/p bilateral knee TKA who presented with chest pain admitted with unstable angina found to have multivessel CAD on CT Cors. Cardiology consulted for further evaluation.    - Patient s/p cath with only moderate nonobstructive CAD - recommend medical management with ASA/Statin (can d/c plavix and metoprolol)  - Negative d-dimer  - Follow up TTE w/bubble study to r/o potential shunt/PFO/VSD seen on CT  - If TTE unremarkable - patient may be discharged home today  - Patient to follow up in our [place] office with Dr. Maynard on Tuesday 9/28 at 3:20 PM ([address], [address], [place] [address] - Office #[phone])    Sae Toledo PA-C  Pager: [phone]

## 2021-09-23 NOTE — DISCHARGE NOTE NURSING/CASE MANAGEMENT/SOCIAL WORK - PATIENT PORTAL LINK FT
You can access the FollowMyHealth Patient Portal offered by [facility] by registering at the following website: [email or web address]. By joining [email or web address] FollowMyHealth portal, you will also be able to view your health information using other applications (apps) compatible with our system.

## 2021-09-23 NOTE — DISCHARGE NOTE PROVIDER - NSDCMRMEDTOKEN_GEN_ALL_CORE_FT
aspirin 81 mg oral delayed release tablet: 1 tab(s) orally once a day MDD:1  atorvastatin 40 mg oral tablet: 1 tab(s) orally once a day MDD:1  finasteride 5 mg oral tablet: 1 tab(s) orally once a day  L THYROXINE ROCHE: 50 microgram(s) orally once a day  tamsulosin 0.4 mg oral capsule: 1 cap(s) orally once a day   aspirin 81 mg oral delayed release tablet: 1 tab(s) orally once a day MDD:1  atorvastatin 40 mg oral tablet: 1 tab(s) orally once a day (at bedtime) MDD:1  finasteride 5 mg oral tablet: 1 tab(s) orally once a day  L THYROXINE ROCHE: 50 microgram(s) orally once a day  tamsulosin 0.4 mg oral capsule: 1 cap(s) orally once a day

## 2021-09-25 LAB
HCV AB S/CO SERPL IA: 0.11 S/CO — SIGNIFICANT CHANGE UP (ref 0–0.99)
HCV AB SERPL-IMP: SIGNIFICANT CHANGE UP

## 2021-12-22 ENCOUNTER — APPOINTMENT (OUTPATIENT)
Dept: ORTHOPEDIC SURGERY | Facility: CLINIC | Age: 71
End: 2021-12-22
Payer: MEDICARE

## 2021-12-22 PROCEDURE — 99214 OFFICE O/P EST MOD 30 MIN: CPT

## 2021-12-22 NOTE — PHYSICAL EXAM
[de-identified] : Constitutional\par o Appearance : well-nourished, well developed, alert, in no acute distress \par Head and Face\par o Head :\par ¦ Inspection : atraumatic, normocephalic\par o Face :\par ¦ Inspection : no visible rash or discoloration\par Respiratory\par o Respiratory Effort: breathing unlabored \par Neurologic\par o Mental Status Examination :\par ¦ Orientation : alert and oriented X 3\par Psychiatric\par o Mood and Affect: mood normal, affect appropriate\par Cardiovascular\par o Observation/Palpation : - no swelling\par Lymphatic\par o Additional Nodes : No palpable lymph nodes present\par \par Right Lower Extremity\par o Buttock : no tenderness, swelling or deformities \par o Right Hip :\par    - Inspection/Palpation : no tenderness, no swelling or deformities\par    - Range of Motion : full and painless in all planes, no crepitance\par    - Stability : joint stability intact\par    - Strength : extension, flexion, adduction, abduction, internal rotation and external rotation 4/5 \par    - Tests: Chata’s test negative\par \par o Right Knee :\par ¦ Inspection/Palpation : no medial or lateral compartment tenderness to palpation, no swelling, well-healed incision\par ¦ Range of Motion : 0-135° painless, goes into recurvatum, hypermobility of the patella with crepitance\par ¦ Stability : no valgus or varus instability present on provocative testing\par ¦ Strength : flexion and extension 4/5\par ¦ Tests and Signs : negative Anterior Drawer\par \par Left Lower Extremity\par o Buttock : no tenderness, swelling or deformities \par o Left Hip :\par    - Inspection/Palpation : no tenderness, no swelling or deformities\par    - Range of Motion : full and painless in all planes, no crepitance\par    - Stability : joint stability intact\par    - Strength : extension, flexion, adduction, abduction, internal rotation and external rotation 5/5 \par    - Tests: Chata’s test negative\par \par o Left Knee :\par ¦ Inspection/Palpation : no tenderness to palpation, no swelling, well-healed incision\par ¦ Range of Motion : 0-140°  painless, mild crepitance, good patellofemoral glide \par ¦ Stability : no valgus or varus instability present on provocative testing\par ¦ Strength : flexion and extension 4+/5\par ¦ Tests and Signs : negative Anterior Drawer\par \par Gait and Station:\par Gait: gait normal, no significant extremity swelling or lymphedema, good proprioception and balance

## 2021-12-22 NOTE — HISTORY OF PRESENT ILLNESS
[de-identified] : 70 year old male presents s/p bilateral TKR, right 2016 and left 10/23/18. He has no complaints about the left knee. He complains of clicking in the right knee that worsens with exercise, deep squatting and on steps. He notes no swelling or buckling. He is going to PT and thinks it is helping. He still intermittent clicking and discomfort. He is concerned about the clicking. \par Pmhx, He is fully COVID-19 vaccinated. \par \par Radiology Results taken on 09/02/2021:\par o Right Knee : Standing AP, Lateral and skyline views of the knee were obtained and revealed excellent position of his right total knee replacement with slight lateral tracking of the patella.\par o Left Knee : Standing AP, Lateral and skyline views of the knee were obtained and revealed excellent position of his left total knee replacement with central tracking of the patella.

## 2021-12-22 NOTE — ADDENDUM
[FreeTextEntry1] : I, Everette Kuhn, acted solely as a scribe for Dr. Quincy Brasher on this date 12/22/2021.\par All medical record entries made by the Scribe were at my, Dr. Quincy Brasher, direction and personally dictated by me on 12/22/2021. I have reviewed the chart and agree that the record accurately reflects my personal performance of the history, physical exam, assessment and plan. I have also personally directed, reviewed, and agreed with the chart.

## 2021-12-22 NOTE — DISCUSSION/SUMMARY
[de-identified] : I went over the pathophysiology of the patient's symptoms in great detail with the patient. I am recommending the patient continues going to physical therapy to obtain increases in his strength and mobility. A prescription was provided. A discussion ensued about a possible arthroscopic surgery and lateral release of the right patella. All of his questions were answered. He understands and consents to the plan.\par \par FU 6 weeks.\par after continuing PT for his right knee.

## 2022-02-17 ENCOUNTER — APPOINTMENT (OUTPATIENT)
Dept: ORTHOPEDIC SURGERY | Facility: CLINIC | Age: 72
End: 2022-02-17
Payer: MEDICARE

## 2022-02-17 DIAGNOSIS — M25.361 OTHER INSTABILITY, RIGHT KNEE: ICD-10-CM

## 2022-02-17 PROCEDURE — 99214 OFFICE O/P EST MOD 30 MIN: CPT

## 2022-02-17 NOTE — DISCUSSION/SUMMARY
[de-identified] : I went over the pathophysiology of the patient's symptoms in great detail with the patient and his daughter Nallely. I informed the patient that surgery will be required to provide him with long term relief from his symptoms given conservative measures are not providing enough relief. We discussed the differences between arthroscopic surgery versus open surgery with a liner exchange. I am recommending an arthroscopy, arthrofibrolysis, and limited lateral release of the patella. We will try this first as a limited procedure. We had a lengthy discussion about the patient's issues, and talked about the benefits and risks of the procedure. The patient understands the most common risks include infection, allergy to the anesthetic and deep vein thrombosis. The risks are accepted. The patient was given no assurances that all the symptoms will be alleviated. The proper pre and post operative procedures and expectations were discussed in extensive detail with the patient. He will need medical clearance. He and his family will consider the surgery. All of their questions were answered. They understand and consent to the plan. \par \par FU after considering a right knee arthroscopy.

## 2022-02-17 NOTE — ADDENDUM
[FreeTextEntry1] : I, Everette Kuhn, acted solely as a scribe for Dr. Quincy Brasher on this date 02/17/2022.\par All medical record entries made by the Scribe were at my, Dr. Quincy Brasher, direction and personally dictated by me on 02/17/2022. I have reviewed the chart and agree that the record accurately reflects my personal performance of the history, physical exam, assessment and plan. I have also personally directed, reviewed, and agreed with the chart.

## 2022-02-17 NOTE — PHYSICAL EXAM
[de-identified] : Constitutional\par o Appearance : well-nourished, well developed, alert, in no acute distress \par Head and Face\par o Head :\par ¦ Inspection : atraumatic, normocephalic\par o Face :\par ¦ Inspection : no visible rash or discoloration\par Respiratory\par o Respiratory Effort: breathing unlabored \par Neurologic\par o Mental Status Examination :\par ¦ Orientation : alert and oriented X 3\par Psychiatric\par o Mood and Affect: mood normal, affect appropriate\par Cardiovascular\par o Observation/Palpation : - no swelling\par Lymphatic\par o Additional Nodes : No palpable lymph nodes present\par \par Right Lower Extremity\par o Buttock : no tenderness, swelling or deformities \par o Right Hip :\par    - Inspection/Palpation : no tenderness, no swelling or deformities\par    - Range of Motion : full and painless in all planes, no crepitance\par    - Stability : joint stability intact\par    - Strength : extension, flexion, adduction, abduction, internal rotation and external rotation, 4+/5 \par    - Tests: Chata’s test negative\par \par o Right Knee :\par ¦ Inspection/Palpation : peripatellar tenderness to palpation, no medial or lateral compartment tenderness to palpation, no swelling, well-healed incision, no warmth\par ¦ Range of Motion : 0-135° with pain, hypermobility of the patella with crepitance\par ¦ Stability : no valgus or varus instability present on provocative testing\par ¦ Strength : flexion and extension 4+/5\par ¦ Tests and Signs : negative Anterior Drawer, negative posterior sag\par \par Left Lower Extremity\par o Buttock : no tenderness, swelling or deformities \par o Left Hip :\par    - Inspection/Palpation : no tenderness, no swelling or deformities\par    - Range of Motion : full and painless in all planes, no crepitance\par    - Stability : joint stability intact\par    - Strength : extension, flexion, adduction, abduction, internal rotation and external rotation 5/5 \par    - Tests: Chata’s test negative\par \par o Left Knee :\par ¦ Inspection/Palpation : no tenderness to palpation, no swelling, well-healed incision\par ¦ Range of Motion : 0-140°  painless, mild crepitance, good patellofemoral glide \par ¦ Stability : no valgus or varus instability present on provocative testing\par ¦ Strength : flexion and extension 4+/5\par ¦ Tests and Signs : negative Anterior Drawer\par \par Gait and Station:\par Gait: gait normal, no significant extremity swelling or lymphedema, good proprioception and balance

## 2022-02-17 NOTE — HISTORY OF PRESENT ILLNESS
[de-identified] : 71 year old male presents s/p bilateral TKR, right 2016 and left 10/23/18. He has no complaints about the left knee. He complains of clicking and discomfort in the right knee that worsens with exercise, deep squatting and on steps. He notes no swelling or buckling. He is going to PT but still has clicking and discomfort. He is concerned about the clicking. He is COVID boosted. \par \par Radiology Results taken on 09/02/2021:\par o Right Knee : Standing AP, Lateral and skyline views of the knee were obtained and revealed excellent position of his right total knee replacement with slight lateral tracking of the patella.\par o Left Knee : Standing AP, Lateral and skyline views of the knee were obtained and revealed excellent position of his left total knee replacement with central tracking of the patella.

## 2022-03-18 ENCOUNTER — OUTPATIENT (OUTPATIENT)
Dept: OUTPATIENT SERVICES | Facility: HOSPITAL | Age: 72
LOS: 1 days | End: 2022-03-18
Payer: MEDICARE

## 2022-03-18 VITALS
RESPIRATION RATE: 18 BRPM | TEMPERATURE: 97 F | HEART RATE: 63 BPM | WEIGHT: 179.9 LBS | SYSTOLIC BLOOD PRESSURE: 151 MMHG | OXYGEN SATURATION: 97 % | HEIGHT: 62 IN | DIASTOLIC BLOOD PRESSURE: 74 MMHG

## 2022-03-18 DIAGNOSIS — Z96.652 PRESENCE OF LEFT ARTIFICIAL KNEE JOINT: Chronic | ICD-10-CM

## 2022-03-18 DIAGNOSIS — Z96.651 PRESENCE OF RIGHT ARTIFICIAL KNEE JOINT: Chronic | ICD-10-CM

## 2022-03-18 DIAGNOSIS — M25.361 OTHER INSTABILITY, RIGHT KNEE: ICD-10-CM

## 2022-03-18 DIAGNOSIS — Z01.818 ENCOUNTER FOR OTHER PREPROCEDURAL EXAMINATION: ICD-10-CM

## 2022-03-18 LAB
ALBUMIN SERPL ELPH-MCNC: 3.8 G/DL — SIGNIFICANT CHANGE UP (ref 3.3–5)
ALP SERPL-CCNC: 73 U/L — SIGNIFICANT CHANGE UP (ref 30–120)
ALT FLD-CCNC: 20 U/L DA — SIGNIFICANT CHANGE UP (ref 10–60)
ANION GAP SERPL CALC-SCNC: 4 MMOL/L — LOW (ref 5–17)
AST SERPL-CCNC: 17 U/L — SIGNIFICANT CHANGE UP (ref 10–40)
BILIRUB SERPL-MCNC: 0.5 MG/DL — SIGNIFICANT CHANGE UP (ref 0.2–1.2)
BUN SERPL-MCNC: 12 MG/DL — SIGNIFICANT CHANGE UP (ref 7–23)
CALCIUM SERPL-MCNC: 8.9 MG/DL — SIGNIFICANT CHANGE UP (ref 8.4–10.5)
CHLORIDE SERPL-SCNC: 104 MMOL/L — SIGNIFICANT CHANGE UP (ref 96–108)
CO2 SERPL-SCNC: 31 MMOL/L — SIGNIFICANT CHANGE UP (ref 22–31)
CREAT SERPL-MCNC: 1.07 MG/DL — SIGNIFICANT CHANGE UP (ref 0.5–1.3)
EGFR: 75 ML/MIN/1.73M2 — SIGNIFICANT CHANGE UP
GLUCOSE SERPL-MCNC: 129 MG/DL — HIGH (ref 70–99)
HCT VFR BLD CALC: 43.7 % — SIGNIFICANT CHANGE UP (ref 39–50)
HGB BLD-MCNC: 13.7 G/DL — SIGNIFICANT CHANGE UP (ref 13–17)
MCHC RBC-ENTMCNC: 26.6 PG — LOW (ref 27–34)
MCHC RBC-ENTMCNC: 31.4 GM/DL — LOW (ref 32–36)
MCV RBC AUTO: 84.7 FL — SIGNIFICANT CHANGE UP (ref 80–100)
NRBC # BLD: 0 /100 WBCS — SIGNIFICANT CHANGE UP (ref 0–0)
PLATELET # BLD AUTO: 311 K/UL — SIGNIFICANT CHANGE UP (ref 150–400)
POTASSIUM SERPL-MCNC: 4.2 MMOL/L — SIGNIFICANT CHANGE UP (ref 3.5–5.3)
POTASSIUM SERPL-SCNC: 4.2 MMOL/L — SIGNIFICANT CHANGE UP (ref 3.5–5.3)
PROT SERPL-MCNC: 7.4 G/DL — SIGNIFICANT CHANGE UP (ref 6–8.3)
RBC # BLD: 5.16 M/UL — SIGNIFICANT CHANGE UP (ref 4.2–5.8)
RBC # FLD: 14.7 % — HIGH (ref 10.3–14.5)
SODIUM SERPL-SCNC: 139 MMOL/L — SIGNIFICANT CHANGE UP (ref 135–145)
WBC # BLD: 6.15 K/UL — SIGNIFICANT CHANGE UP (ref 3.8–10.5)
WBC # FLD AUTO: 6.15 K/UL — SIGNIFICANT CHANGE UP (ref 3.8–10.5)

## 2022-03-18 PROCEDURE — 85027 COMPLETE CBC AUTOMATED: CPT

## 2022-03-18 PROCEDURE — 93005 ELECTROCARDIOGRAM TRACING: CPT

## 2022-03-18 PROCEDURE — 80053 COMPREHEN METABOLIC PANEL: CPT

## 2022-03-18 PROCEDURE — 36415 COLL VENOUS BLD VENIPUNCTURE: CPT

## 2022-03-18 PROCEDURE — 93010 ELECTROCARDIOGRAM REPORT: CPT

## 2022-03-18 PROCEDURE — G0463: CPT

## 2022-03-18 NOTE — H&P PST ADULT - NSICDXPASTMEDICALHX_GEN_ALL_CORE_FT
PAST MEDICAL HISTORY:  BPH (benign prostatic hyperplasia)     Class 1 obesity with body mass index (BMI) of 32.0 to 32.9 in adult     GERD (gastroesophageal reflux disease)     Hyperlipidemia     Hypothyroid     Osteoarthritis     Sleep apnea CPAP

## 2022-03-18 NOTE — H&P PST ADULT - NSICDXPASTSURGICALHX_GEN_ALL_CORE_FT
PAST SURGICAL HISTORY:  S/P TKR (total knee replacement), right 2016    Status post left knee replacement 2018

## 2022-03-18 NOTE — H&P PST ADULT - HISTORY OF PRESENT ILLNESS
72yo male patient who is s/p Right TKR in 2016- He states that since that time he has had difficulty with pain, limited ROM and difficulty climbing stairs despite PT. He has not had any other treatment since his TKR. He rates the pain at 7/10 and he is not taking anything for pain. HE was told that surgery is recommended and he presents today for PSTs.  70yo male patient who is s/p Right TKR in 2016- states that since that time he has had difficulty with pain, limited ROM and difficulty climbing stairs despite PT. He has not had any other treatment since his TKR. He rates the pain at 7/10 and he is not taking anything for pain. HE was told that surgery is recommended and he presents today for PSTs.

## 2022-03-18 NOTE — H&P PST ADULT - MUSCULOSKELETAL
details… detailed exam right knee/no joint erythema/no joint warmth/no calf tenderness/decreased ROM/decreased ROM due to pain/joint swelling/diminished strength

## 2022-03-18 NOTE — H&P PST ADULT - FALL HARM RISK - UNIVERSAL INTERVENTIONS
Bed in lowest position, wheels locked, appropriate side rails in place/Call bell, personal items and telephone in reach/Instruct patient to call for assistance before getting out of bed or chair/Non-slip footwear when patient is out of bed/[place] to call system/Physically safe environment - no spills, clutter or unnecessary equipment/Purposeful Proactive Rounding/Room/bathroom lighting operational, light cord in reach

## 2022-03-18 NOTE — H&P PST ADULT - ASSESSMENT
72yo male patient scheduled for surgery on 4/5/2022. He will obtain Medical Clearance from his PMD. He will be NPO as per Anesthesia and he will take Omeprazole, Tamsulosin, Finasteride and Synthroid on AM of surgery with sips of water. All pre-op instructions reviewed with patient, questions addressed. As per Covid19 protocol, he will be screened on 4/3/22 @ 8:30am.   .

## 2022-03-18 NOTE — H&P PST ADULT - PROBLEM SELECTOR PLAN 1
Exam Under Anesthesia, Arthroscopy on RIGHT Total Knee Replacement, Possible Lateral Release on 4/5/2022.

## 2022-04-03 ENCOUNTER — OUTPATIENT (OUTPATIENT)
Dept: OUTPATIENT SERVICES | Facility: HOSPITAL | Age: 72
LOS: 1 days | End: 2022-04-03
Payer: MEDICARE

## 2022-04-03 DIAGNOSIS — Z96.651 PRESENCE OF RIGHT ARTIFICIAL KNEE JOINT: Chronic | ICD-10-CM

## 2022-04-03 DIAGNOSIS — Z20.828 CONTACT WITH AND (SUSPECTED) EXPOSURE TO OTHER VIRAL COMMUNICABLE DISEASES: ICD-10-CM

## 2022-04-03 DIAGNOSIS — Z96.652 PRESENCE OF LEFT ARTIFICIAL KNEE JOINT: Chronic | ICD-10-CM

## 2022-04-03 LAB — SARS-COV-2 RNA SPEC QL NAA+PROBE: SIGNIFICANT CHANGE UP

## 2022-04-03 PROCEDURE — U0005: CPT

## 2022-04-03 PROCEDURE — U0003: CPT

## 2022-04-04 ENCOUNTER — TRANSCRIPTION ENCOUNTER (OUTPATIENT)
Age: 72
End: 2022-04-04

## 2022-04-04 VITALS
TEMPERATURE: 98 F | SYSTOLIC BLOOD PRESSURE: 145 MMHG | DIASTOLIC BLOOD PRESSURE: 63 MMHG | HEIGHT: 63 IN | OXYGEN SATURATION: 100 % | HEART RATE: 64 BPM | WEIGHT: 173.5 LBS | RESPIRATION RATE: 24 BRPM

## 2022-04-04 NOTE — ASU PATIENT PROFILE, ADULT - FALL HARM RISK - UNIVERSAL INTERVENTIONS
Bed in lowest position, wheels locked, appropriate side rails in place/Call bell, personal items and telephone in reach/Instruct patient to call for assistance before getting out of bed or chair/Non-slip footwear when patient is out of bed/[place] to call system/Physically safe environment - no spills, clutter or unnecessary equipment/Purposeful Proactive Rounding/Room/bathroom lighting operational, light cord in reach

## 2022-04-05 ENCOUNTER — OUTPATIENT (OUTPATIENT)
Dept: OUTPATIENT SERVICES | Facility: HOSPITAL | Age: 72
LOS: 1 days | End: 2022-04-05
Payer: MEDICARE

## 2022-04-05 ENCOUNTER — TRANSCRIPTION ENCOUNTER (OUTPATIENT)
Age: 72
End: 2022-04-05

## 2022-04-05 ENCOUNTER — APPOINTMENT (OUTPATIENT)
Dept: ORTHOPEDIC SURGERY | Facility: HOSPITAL | Age: 72
End: 2022-04-05

## 2022-04-05 VITALS — HEART RATE: 86 BPM | OXYGEN SATURATION: 99 % | RESPIRATION RATE: 16 BRPM

## 2022-04-05 DIAGNOSIS — Z96.651 PRESENCE OF RIGHT ARTIFICIAL KNEE JOINT: Chronic | ICD-10-CM

## 2022-04-05 DIAGNOSIS — M25.361 OTHER INSTABILITY, RIGHT KNEE: ICD-10-CM

## 2022-04-05 DIAGNOSIS — Z96.652 PRESENCE OF LEFT ARTIFICIAL KNEE JOINT: Chronic | ICD-10-CM

## 2022-04-05 PROCEDURE — 97161 PT EVAL LOW COMPLEX 20 MIN: CPT

## 2022-04-05 PROCEDURE — 29876 ARTHRS KNEE SURG SYNVCT MAJ: CPT | Mod: RT

## 2022-04-05 PROCEDURE — 29873 ARTHRS KNEE SURG W/LAT RLS: CPT | Mod: RT

## 2022-04-05 RX ORDER — HYDROMORPHONE HYDROCHLORIDE 2 MG/ML
0.5 INJECTION INTRAMUSCULAR; INTRAVENOUS; SUBCUTANEOUS
Refills: 0 | Status: DISCONTINUED | OUTPATIENT
Start: 2022-04-05 | End: 2022-04-05

## 2022-04-05 RX ORDER — ONDANSETRON 8 MG/1
4 TABLET, FILM COATED ORAL ONCE
Refills: 0 | Status: DISCONTINUED | OUTPATIENT
Start: 2022-04-05 | End: 2022-04-05

## 2022-04-05 RX ORDER — CEFAZOLIN SODIUM 1 G
2000 VIAL (EA) INJECTION ONCE
Refills: 0 | Status: COMPLETED | OUTPATIENT
Start: 2022-04-05 | End: 2022-04-05

## 2022-04-05 RX ORDER — CHLORHEXIDINE GLUCONATE 213 G/1000ML
1 SOLUTION TOPICAL ONCE
Refills: 0 | Status: COMPLETED | OUTPATIENT
Start: 2022-04-05 | End: 2022-04-05

## 2022-04-05 RX ORDER — OXYCODONE AND ACETAMINOPHEN 5; 325 MG/1; MG/1
1 TABLET ORAL
Qty: 15 | Refills: 0
Start: 2022-04-05

## 2022-04-05 RX ORDER — SODIUM CHLORIDE 9 MG/ML
1000 INJECTION, SOLUTION INTRAVENOUS
Refills: 0 | Status: DISCONTINUED | OUTPATIENT
Start: 2022-04-05 | End: 2022-04-05

## 2022-04-05 RX ORDER — ACETAMINOPHEN 500 MG
1000 TABLET ORAL ONCE
Refills: 0 | Status: COMPLETED | OUTPATIENT
Start: 2022-04-05 | End: 2022-04-05

## 2022-04-05 RX ORDER — APREPITANT 80 MG/1
40 CAPSULE ORAL ONCE
Refills: 0 | Status: COMPLETED | OUTPATIENT
Start: 2022-04-05 | End: 2022-04-05

## 2022-04-05 RX ORDER — OXYCODONE HYDROCHLORIDE 5 MG/1
5 TABLET ORAL ONCE
Refills: 0 | Status: DISCONTINUED | OUTPATIENT
Start: 2022-04-05 | End: 2022-04-05

## 2022-04-05 RX ADMIN — APREPITANT 40 MILLIGRAM(S): 80 CAPSULE ORAL at 12:57

## 2022-04-05 RX ADMIN — SODIUM CHLORIDE 75 MILLILITER(S): 9 INJECTION, SOLUTION INTRAVENOUS at 16:14

## 2022-04-05 RX ADMIN — CHLORHEXIDINE GLUCONATE 1 APPLICATION(S): 213 SOLUTION TOPICAL at 12:57

## 2022-04-05 NOTE — ASU DISCHARGE PLAN (ADULT/PEDIATRIC) - NS MD DC FALL RISK RISK
For information on Fall & Injury Prevention, visit: [email or web address] OR  [email or web address] OR  https://www.cdc.gov/steadi/patient.html

## 2022-04-05 NOTE — ASU DISCHARGE PLAN (ADULT/PEDIATRIC) - CARE PROVIDER_API CALL
Quincy Brasher)  Orthopaedic Surgery  [address]  [place] [address]  Phone: [phone]  Fax: [phone]  Follow Up Time:

## 2022-04-05 NOTE — PHYSICAL THERAPY INITIAL EVALUATION ADULT - PERTINENT HX OF CURRENT PROBLEM, REHAB EVAL
72yo male patient who is s/p Right TKR in 2016- states that since that time he has had difficulty with pain, limited ROM and difficulty climbing stairs despite PT. He has not had any other treatment since his TKR. He rates the pain at 7/10 and he is not taking anything for pain. HE was told that surgery is recommended and he presents today for PSTs.

## 2022-04-05 NOTE — PHYSICAL THERAPY INITIAL EVALUATION ADULT - ADDITIONAL COMMENTS
Pt lives in private home with 4 [place] w/o rail and 1 step inside. Pt was independent prior to surgery. Pt given cane adjusted to proper height.

## 2022-04-05 NOTE — ASU DISCHARGE PLAN (ADULT/PEDIATRIC) - ACTIVITY LEVEL
cane/Weight bearing as tolerated/Elevate extremity cane/No heavy lifting/No sports/gym/Weight bearing as tolerated/Elevate extremity/No tub baths

## 2022-04-06 PROBLEM — E66.9 OBESITY, UNSPECIFIED: Chronic | Status: ACTIVE | Noted: 2022-03-18

## 2022-04-06 PROBLEM — K21.9 GASTRO-ESOPHAGEAL REFLUX DISEASE WITHOUT ESOPHAGITIS: Chronic | Status: ACTIVE | Noted: 2022-03-18

## 2022-04-06 PROBLEM — M19.90 UNSPECIFIED OSTEOARTHRITIS, UNSPECIFIED SITE: Chronic | Status: ACTIVE | Noted: 2022-03-18

## 2022-04-06 PROBLEM — G47.30 SLEEP APNEA, UNSPECIFIED: Chronic | Status: ACTIVE | Noted: 2018-10-08

## 2022-04-14 ENCOUNTER — APPOINTMENT (OUTPATIENT)
Dept: ORTHOPEDIC SURGERY | Facility: CLINIC | Age: 72
End: 2022-04-14
Payer: MEDICARE

## 2022-04-14 PROCEDURE — 99024 POSTOP FOLLOW-UP VISIT: CPT

## 2022-04-14 PROCEDURE — 73562 X-RAY EXAM OF KNEE 3: CPT | Mod: RT

## 2022-04-14 NOTE — ADDENDUM
[FreeTextEntry1] : I, Everette Kuhn, acted solely as a scribe for Dr. Quincy Brasher on this date 04/14/2022.\par All medical record entries made by the Scribe were at my, Dr. Quincy Brasher, direction and personally dictated by me on 04/14/2022. I have reviewed the chart and agree that the record accurately reflects my personal performance of the history, physical exam, assessment and plan. I have also personally directed, reviewed, and agreed with the chart.

## 2022-04-14 NOTE — DISCUSSION/SUMMARY
[de-identified] : I went over the pathophysiology of the patient's symptoms in great detail with the patient and his daughter. I went over his x-rays and arthroscopic pictures with them in great detail. I advised them that his right knee has slightly loosened and over time if it becomes worse we could exchange the liner to a bigger one. At this time, he will start a course of physical therapy for strengthening and flexibility. A prescription was provided. I showed the patient how to massage his scars with hand cream in order to desensitize the area and advised him to do it daily when the steri strips fall off. He should be doing the exercises in the booklet he was given at the time of the surgery. At-home strengthening and stretching exercises were discussed and demonstrated with the patient. He should focus on light weight and high repetition exercises. He should follow-up in 6 weeks. All of his questions were answered. He understands and consents to the plan.\par \par FU in 6 weeks.\par after undergoing PT for his right knee.

## 2022-04-14 NOTE — PHYSICAL EXAM
[de-identified] : Constitutional\par o Appearance : well-nourished, well developed, alert, in no acute distress \par Head and Face\par o Head :\par ¦ Inspection : atraumatic, normocephalic\par o Face :\par ¦ Inspection : no visible rash or discoloration\par Respiratory\par o Respiratory Effort: breathing unlabored \par Neurologic\par o Mental Status Examination :\par ¦ Orientation : alert and oriented X 3\par Psychiatric\par o Mood and Affect: mood normal, affect appropriate\par Cardiovascular\par o Observation/Palpation : - no swelling\par Lymphatic\par o Additional Nodes : No palpable lymph nodes present\par \par Right Lower Extremity\par o Buttock : no tenderness, swelling or deformities \par o Right Hip :\par    - Inspection/Palpation : no tenderness, no swelling or deformities\par    - Range of Motion : full and painless in all planes, no crepitance\par    - Stability : joint stability intact\par    - Strength : extension, flexion, adduction, abduction, internal rotation and external rotation, 4+/5 \par    - Tests: Chata’s test negative\par \par o Right Knee :\par ¦ Inspection/Palpation : no tenderness to palpation, no swelling, sutures were removed, steri strips and benzoin were applied, well-healed incisions, resolving ecchymosis\par ¦ Range of Motion : 0-135°, hypermobility of the patella\par ¦ Stability : no valgus or varus instability present on provocative testing\par ¦ Strength : flexion and extension 4+/5\par ¦ Tests and Signs : negative Anterior Drawer, negative posterior sag\par \par Left Lower Extremity\par o Buttock : no tenderness, swelling or deformities \par o Left Hip :\par    - Inspection/Palpation : no tenderness, no swelling or deformities\par    - Range of Motion : full and painless in all planes, no crepitance\par    - Stability : joint stability intact\par    - Strength : extension, flexion, adduction, abduction, internal rotation and external rotation 5/5 \par    - Tests: Chata’s test negative\par \par o Left Knee :\par ¦ Inspection/Palpation : no tenderness to palpation, no swelling, incision well-healed \par ¦ Range of Motion : 0-140°  painless, mild crepitance, good patellofemoral glide \par ¦ Stability : no valgus or varus instability present on provocative testing\par ¦ Strength : flexion and extension 4+/5\par ¦ Tests and Signs : negative Anterior Drawer\par \par Gait and Station:\par Gait: heel/toe on the right, no significant extremity swelling or lymphedema, good proprioception and balance\par \par Radiology Results:\par o Right Knee : Standing AP, Lateral and skyline views of the knee were obtained and reveal slight lateral tracking of the patella with maintenance in position of the total knee replacement.

## 2022-04-14 NOTE — HISTORY OF PRESENT ILLNESS
[de-identified] : 71 year old male returns for the 1st postoperative visit, 9 days s/p EUA; arthroscopy, right TKR; arthrofibrolysis and limited lateral release, right TKR done 4/5/2022. He is s/p bilateral TKR, right 2016 and left 10/23/18. The patient is recovering at home. He reports mild postoperative pain. He notes that the popping in his knee is already gone. He presents ambulating independently. I went over the arthroscopic pictures with them in great detail. \par He has no complaints about the left knee. He is COVID boosted. \par \par Radiology Results taken on 09/02/2021:\par o Left Knee : Standing AP, Lateral and skyline views of the knee were obtained and revealed excellent position of his left total knee replacement with central tracking of the patella.

## 2022-04-14 NOTE — REASON FOR VISIT
[Post Operative Visit] : a post operative visit for [Aftercare Following Surgery] : aftercare following surgery [Other: _____] : [unfilled] [FreeTextEntry2] : s/p EUA; arthroscopy, right TKR; arthrofibrolysis and limited lateral release, right TKR done 4/5/2022

## 2022-05-26 ENCOUNTER — APPOINTMENT (OUTPATIENT)
Dept: ORTHOPEDIC SURGERY | Facility: CLINIC | Age: 72
End: 2022-05-26
Payer: MEDICARE

## 2022-05-26 DIAGNOSIS — M17.0 BILATERAL PRIMARY OSTEOARTHRITIS OF KNEE: ICD-10-CM

## 2022-05-26 PROCEDURE — 99024 POSTOP FOLLOW-UP VISIT: CPT

## 2022-05-26 NOTE — PHYSICAL EXAM
[de-identified] : Constitutional\par o Appearance : well-nourished, well developed, alert, in no acute distress \par Head and Face\par o Head :\par ¦ Inspection : atraumatic, normocephalic\par o Face :\par ¦ Inspection : no visible rash or discoloration\par Respiratory\par o Respiratory Effort: breathing unlabored \par Neurologic\par o Mental Status Examination :\par ¦ Orientation : alert and oriented X 3\par Psychiatric\par o Mood and Affect: mood normal, affect appropriate\par Cardiovascular\par o Observation/Palpation : - no swelling\par Lymphatic\par o Additional Nodes : No palpable lymph nodes present\par \par Right Lower Extremity\par o Buttock : no tenderness, swelling or deformities \par o Right Hip :\par    - Inspection/Palpation : no tenderness, no swelling or deformities\par    - Range of Motion : full and painless in all planes, no crepitance\par    - Stability : joint stability intact\par    - Strength : extension, flexion, adduction, abduction, internal rotation and external rotation, 4/5 \par    - Tests: Chata’s test negative\par \par o Right Knee :\par ¦ Inspection/Palpation : no tenderness to palpation, no swelling, well-healed incisions\par ¦ Range of Motion : -3-130°, hypermobility of the patella\par ¦ Stability : no valgus or varus instability present on provocative testing\par ¦ Strength : flexion and extension 4/5\par ¦ Tests and Signs : negative Anterior Drawer, negative posterior sag\par \par Left Lower Extremity\par o Buttock : no tenderness, swelling or deformities \par o Left Hip :\par    - Inspection/Palpation : no tenderness, no swelling or deformities\par    - Range of Motion : full and painless in all planes, no crepitance\par    - Stability : joint stability intact\par    - Strength : extension, flexion, adduction, abduction, internal rotation and external rotation 5/5 \par    - Tests: Chata’s test negative\par \par o Left Knee :\par ¦ Inspection/Palpation : no tenderness to palpation, no swelling, incision well-healed \par ¦ Range of Motion : 0-140°  painless, mild crepitance, good patellofemoral glide \par ¦ Stability : no valgus or varus instability present on provocative testing\par ¦ Strength : flexion and extension 4+/5\par ¦ Tests and Signs : negative Anterior Drawer\par \par Gait and Station:\par Gait: heel/toe on the right, no significant extremity swelling or lymphedema, good proprioception and balance

## 2022-05-26 NOTE — HISTORY OF PRESENT ILLNESS
[de-identified] : 71 year old male s/p bilateral TKR (right 2016, left 10/13/13) returns for the 2nd postoperative visit, 7 weeks s/p EUA; arthroscopy, right TKR; arthrofibrolysis and limited lateral release, right TKR done 4/5/2022. He is attending outpatient PT. He was going twice a week until last week when he went three times. He tries to exercise at home, too. He has 2.5 lb ankle weights. He says his pain is less. He presents ambulating independently. He has no complaints about the left knee. He is COVID boosted. \par \par Radiology Results taken on 04/14/2022:\par o Right Knee : Standing AP, Lateral and skyline views of the knee were obtained and reveal slight lateral tracking of the patella with maintenance in position of the total knee replacement.\par \par Radiology Results taken on 09/02/2021:\par o Left Knee : Standing AP, Lateral and skyline views of the knee were obtained and revealed excellent position of his left total knee replacement with central tracking of the patella.

## 2022-05-26 NOTE — ADDENDUM
[FreeTextEntry1] : I, Everette Kuhn, acted solely as a scribe for Dr. Quincy Brasher on this date 05/26/2022.\par All medical record entries made by the Scribe were at my, Dr. Quincy Brasher, direction and personally dictated by me on 05/26/2022. I have reviewed the chart and agree that the record accurately reflects my personal performance of the history, physical exam, assessment and plan. I have also personally directed, reviewed, and agreed with the chart.

## 2022-05-26 NOTE — DISCUSSION/SUMMARY
[de-identified] : I went over the pathophysiology of the patient's symptoms in great detail with the patient. At-home strengthening exercises were discussed and demonstrated in great detail with the patient, with an exercise sheet being provided. He should purchase adjustable Velcro ankle weights that range from 0-5 pounds and begin a diligent at-home exercise program. He should focus on light weight and high repetition exercises. I am recommending the patient continues going to physical therapy to obtain increases in his strength and mobility. A prescription was provided. All of his questions were answered. He understands and consents to the plan.\par \par FU in 6 weeks.\par after continuing PT for his right knee.

## 2022-05-26 NOTE — REASON FOR VISIT
[Post Operative Visit] : a post operative visit for [FreeTextEntry2] : s/p EUA; arthroscopy, right TKR; arthrofibrolysis and limited lateral release, right TKR done 4/5/2022

## 2022-06-04 NOTE — H&P PST ADULT - FUNCTIONAL ASSESSMENT - DAILY ACTIVITY 4.
Patient's mother calling in stating that her daughter is feeling much better today but she is still having pockets of pus in the back of her throat  She states that her fever is resolved today and her pain is better  She would like to know if the results for her strep are back yet and if she needs to be started on antibiotics  The office called her back yesterday regarding her results but was unable to get ahold of her and there are no notes regarding the result of her rapid strep test so unable to relay them to patient  Patient's throat culture and Covid are still pending  Advised mother to continue with home care if it is helping and to call back or go to an  with any worsening symptoms  Reason for Disposition   [1] Sore throat is the only symptom AND [2] present > 48 hours    Answer Assessment - Initial Assessment Questions  1  ONSET: "When did the throat start hurting?" (Hours or days ago)       Yesterday     2  SEVERITY: "How bad is the sore throat?"      * MILD: doesn't interfere with eating or normal activities     * MODERATE: interferes with eating some solids and normal activities     * SEVERE PAIN: excruciating pain, interferes with most normal activities     * SEVERE DYSPHAGIA: can't swallow liquids, drooling      Mild today, much better than it was yesterday     3  STREP EXPOSURE: "Has there been any exposure to strep within the past week?" If so, ask: "What type of contact occurred?"       Unsure     4  VIRAL SYMPTOMS: "Are there any symptoms of a cold, such as a runny nose, cough, hoarse voice/cry or red eyes?"      Sore throat and pus pockets in the back of the throat     5  FEVER: "Does your child have a fever?" If so, ask: "What is it?", "How was it measured?" and "When did it start?"       No fever today     6  PUS ON THE TONSILS: Only ask about this if the caller has already told you that they've looked at the throat  Pus in the back of throat     7   CHILD'S APPEARANCE: "How sick is your child acting?" " What is he doing right now?" If asleep, ask: "How was he acting before he went to sleep?"      Acting normal, eating and drinking soft foods, not as much as usual     Protocols used: SORE THROAT-PEDIATRIC-AH 4 = No assist / stand by assistance

## 2022-07-07 ENCOUNTER — APPOINTMENT (OUTPATIENT)
Dept: ORTHOPEDIC SURGERY | Facility: CLINIC | Age: 72
End: 2022-07-07

## 2022-07-07 DIAGNOSIS — M17.10 UNILATERAL PRIMARY OSTEOARTHRITIS, UNSPECIFIED KNEE: ICD-10-CM

## 2022-07-07 DIAGNOSIS — T84.82XD FIBROSIS DUE TO INTERNAL ORTHOPEDIC PROSTHETIC DEVICES, IMPLANTS AND GRAFTS, SUBSEQUENT ENCOUNTER: ICD-10-CM

## 2022-07-07 PROCEDURE — 99214 OFFICE O/P EST MOD 30 MIN: CPT

## 2022-07-07 RX ORDER — FINASTERIDE 5 MG/1
5 TABLET, FILM COATED ORAL
Qty: 90 | Refills: 0 | Status: ACTIVE | COMMUNITY
Start: 2022-03-11

## 2022-07-07 RX ORDER — ATORVASTATIN CALCIUM 40 MG/1
40 TABLET, FILM COATED ORAL
Qty: 90 | Refills: 0 | Status: ACTIVE | COMMUNITY
Start: 2021-10-26

## 2022-07-07 NOTE — PHYSICAL EXAM
[de-identified] : Constitutional\par o Appearance : well-nourished, well developed, alert, in no acute distress \par Head and Face\par o Head :\par ¦ Inspection : atraumatic, normocephalic\par o Face :\par ¦ Inspection : no visible rash or discoloration\par Respiratory\par o Respiratory Effort: breathing unlabored \par Neurologic\par o Mental Status Examination :\par ¦ Orientation : alert and oriented X 3\par Psychiatric\par o Mood and Affect: mood normal, affect appropriate\par Cardiovascular\par o Observation/Palpation : - no swelling\par Lymphatic\par o Additional Nodes : No palpable lymph nodes present\par \par Right Lower Extremity\par o Buttock : no tenderness, swelling or deformities \par o Right Hip :\par    - Inspection/Palpation : no tenderness, no swelling or deformities\par    - Range of Motion : full and painless in all planes, no crepitance\par    - Stability : joint stability intact\par    - Strength : extension, flexion, adduction, abduction, internal rotation and external rotation, 5/5 \par    - Tests: Chata’s test negative\par \par o Right Knee :\par ¦ Inspection/Palpation : no tenderness to palpation, no swelling, well-healed incisions\par ¦ Range of Motion : 0-125°, mild recurvatum, hypermobility of the patella\par ¦ Stability : mild valgus / varus instability present on provocative testing\par ¦ Strength : flexion and extension 5/5\par ¦ Tests and Signs : mildly positive Anterior Drawer, negative posterior sag\par \par Left Lower Extremity\par o Buttock : no tenderness, swelling or deformities \par o Left Hip :\par    - Inspection/Palpation : no tenderness, no swelling or deformities\par    - Range of Motion : full and painless in all planes, no crepitance\par    - Stability : joint stability intact\par    - Strength : extension, flexion, adduction, abduction, internal rotation and external rotation, 5/5 \par    - Tests: Chata’s test negative\par \par o Left Knee :\par ¦ Inspection/Palpation : no tenderness to palpation, no swelling, incision well-healed \par ¦ Range of Motion : 0-140° painless, mild crepitance, good patellofemoral glide \par ¦ Stability : no valgus or varus instability present on provocative testing\par ¦ Strength : flexion and extension 5/5\par ¦ Tests and Signs : negative Anterior Drawer\par \par Gait and Station:\par Gait: heel/toe on the right and left, no significant extremity swelling or lymphedema, good proprioception and balance

## 2022-07-07 NOTE — HISTORY OF PRESENT ILLNESS
[de-identified] : 71 year old male s/p bilateral TKR (Right 2016, Left 10/13/13) returns 3 months s/p arthrofibrolysis and limited lateral release, right TKR done on 4/5/2022. He is out of PT at this point. He started going to the gym yesterday. He reports that his knee feels a little better, but he still has pain scale of 7/10 in the right knee. He occasionally has difficulty extending his knee. He presents ambulating independently. He has no complaints about the left knee. He is COVID boosted. \par \par Radiology Results 04/14/2022:\par o Right Knee : Standing AP, Lateral and skyline views of the knee were obtained and reveal slight lateral tracking of the patella with maintenance in position of the total knee replacement.\par \par Radiology Results 09/02/2021:\par o Left Knee : Standing AP, Lateral and skyline views of the knee were obtained and revealed excellent position of his left total knee replacement with central tracking of the patella.

## 2022-07-07 NOTE — DISCUSSION/SUMMARY
[de-identified] : I went over the pathophysiology of the patient's symptoms in great detail and used models to aid in my explanation. I would like him to keep exercising for 6 weeks to see if his knee improves. Gym and pool exercises were discussed and demonstrated with the patient. I instructed him on tightening exercises. He will follow-up in 6 weeks. If he continues to have pain, the next suggestion would be a liner exchange to a thicker liner. All of his questions were answered. He understands and consents to the plan.\par \par FU in 6 weeks.

## 2022-07-07 NOTE — ADDENDUM
[FreeTextEntry1] : I, Everette Kuhn, acted solely as a scribe for Dr. Quincy Brasher on this date 07/07/2022.\par All medical record entries made by the Scribe were at my, Dr. Quincy Brasher, direction and personally dictated by me on 07/07/2022. I have reviewed the chart and agree that the record accurately reflects my personal performance of the history, physical exam, assessment and plan. I have also personally directed, reviewed, and agreed with the chart.

## 2022-07-07 NOTE — REASON FOR VISIT
[Follow-Up Visit] : a follow-up visit for [FreeTextEntry2] : s/p arthrofibrolysis and limited lateral release, right TKR. DOS 4/5/2022.

## 2022-07-28 NOTE — END OF VISIT
[Time Spent: ___ minutes] : I have spent [unfilled] minutes of time on the encounter. [place] Counseling:  I discussed with the patient the risks of imiquimod including but not limited to erythema, scaling, itching, weeping, crusting, and pain.  Patient understands that the inflammatory response to imiquimod is variable from person to person and was educated regarded proper titration schedule.  If flu-like symptoms develop, patient knows to discontinue the medication and contact us.

## 2022-08-18 ENCOUNTER — APPOINTMENT (OUTPATIENT)
Dept: ORTHOPEDIC SURGERY | Facility: CLINIC | Age: 72
End: 2022-08-18

## 2022-09-13 ENCOUNTER — APPOINTMENT (OUTPATIENT)
Dept: ORTHOPEDIC SURGERY | Facility: CLINIC | Age: 72
End: 2022-09-13

## 2022-09-13 VITALS
BODY MASS INDEX: 30.48 KG/M2 | HEIGHT: 63 IN | WEIGHT: 172 LBS | SYSTOLIC BLOOD PRESSURE: 120 MMHG | HEART RATE: 66 BPM | DIASTOLIC BLOOD PRESSURE: 80 MMHG

## 2022-09-13 DIAGNOSIS — Z96.651 PAIN DUE TO INTERNAL ORTHOPEDIC PROSTHETIC DEVICES, IMPLANTS AND GRAFTS, INITIAL ENCOUNTER: ICD-10-CM

## 2022-09-13 DIAGNOSIS — T84.84XA PAIN DUE TO INTERNAL ORTHOPEDIC PROSTHETIC DEVICES, IMPLANTS AND GRAFTS, INITIAL ENCOUNTER: ICD-10-CM

## 2022-09-13 DIAGNOSIS — Z96.653 PRESENCE OF ARTIFICIAL KNEE JOINT, BILATERAL: ICD-10-CM

## 2022-09-13 PROCEDURE — 73564 X-RAY EXAM KNEE 4 OR MORE: CPT | Mod: 50

## 2022-09-13 PROCEDURE — 99215 OFFICE O/P EST HI 40 MIN: CPT

## 2022-09-13 NOTE — DISCUSSION/SUMMARY
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  This could be due to implant positioning.  This is acutely worse since the arthroscopic lateral release and lysis of adhesions.  He has failed a course of conservative management and I recommend revision total knee arthroplasty this time.  He is here for second opinion The pain does not radiate down the leg and it is not associated with numbness or tingling or weakness. today and will be follow-up with his surgeon Dr. Brasher to to indicate the surgery.  Several questions sought and answered to

## 2022-09-13 NOTE — PHYSICAL EXAM
[de-identified] : Patient is well nourished, well-developed, in no acute distress, with appropriate mood and affect. The patient is oriented to time, place, and person. Respirations are even and unlabored. Gait evaluation does reveal a limp. There is no inguinal adenopathy. Bilateral limbs are well-perfused, without skin lesions, shows a grossly normal motor and sensory examination. The right knee motion is significantly reduced and does cause significant pain. The right knee moves from 0 to 125 degrees. The knee is stable within that range-of-motion to AP and ML stress. The alignment of the knee is neutral.  Patella clunk and obvious maltracking noted as I go from extension to flexion of flexion to extension.  The patella is laterally tracking and popping out of the trochlear groove.  Well-healed midline surgical scar.  Muscle strength is normal. Pedal pulses are palpable. Hip examination was negative. The left knee motion is painless and the left knee moves from 0 to 125 degrees. The knee is stable within that range-of-motion to AP and ML stress. The alignment of the knee is neutral.  Well-healed midline surgical scar.  Muscle strength is normal. Pedal pulses are palpable. Hip examination was negative. [de-identified] : AP, lateral, tunnel, and sunrise knee x-rays of the bilateral knee were ordered and obtained in the office and demonstrate satisfactory position and alignment of the components are present. No signs of loosening are seen.  There is lateral patellar tracking noted in the right knee which is worsening from previous imaging and much worse than 2 years ago.

## 2022-09-13 NOTE — HISTORY OF PRESENT ILLNESS
[de-identified] : This is very nice 71-year-old gentleman experiencing right knee pain, which is severe in intensity.  History of right total knee arthroplasty in 2016 by .  This was complicated by chronic pain and some patella maltracking and underwent arthroscopic lateral release April 8, 2022 which did not help and in fact made the pain worse.  He is now having instability with stairs.  He notes clicking in the knee.  It is getting stuck in position.  Had a successful left total knee arthroplasty with the same surgeon a few years ago.  Physical therapy not helped the right knee.  The right knee is giving way.  NSAIDs do not help.  Use ambulatory assistive devices such as canes.  The patient denies any radiation of the pain to the feet and it is not associated with numbness, tingling, or weakness.I reviewed Dr. Delong's notes for the purpose of today's visit.

## 2022-09-13 NOTE — REASON FOR VISIT
[Initial Visit] : an initial visit for [Artificial Knee Joint] : artificial knee joint [Knee Pain] : knee pain [Family Member] : family member

## 2022-10-11 ENCOUNTER — APPOINTMENT (OUTPATIENT)
Dept: ORTHOPEDIC SURGERY | Facility: CLINIC | Age: 72
End: 2022-10-11

## 2022-10-11 ENCOUNTER — APPOINTMENT (OUTPATIENT)
Dept: GASTROENTEROLOGY | Facility: CLINIC | Age: 72
End: 2022-10-11

## 2022-10-11 ENCOUNTER — OUTPATIENT (OUTPATIENT)
Dept: OUTPATIENT SERVICES | Facility: HOSPITAL | Age: 72
LOS: 1 days | End: 2022-10-11
Payer: MEDICARE

## 2022-10-11 ENCOUNTER — APPOINTMENT (OUTPATIENT)
Dept: CT IMAGING | Facility: IMAGING CENTER | Age: 72
End: 2022-10-11

## 2022-10-11 VITALS
HEART RATE: 7 BPM | TEMPERATURE: 96.9 F | DIASTOLIC BLOOD PRESSURE: 70 MMHG | WEIGHT: 175 LBS | SYSTOLIC BLOOD PRESSURE: 120 MMHG | BODY MASS INDEX: 31.01 KG/M2 | HEIGHT: 63 IN | OXYGEN SATURATION: 98 %

## 2022-10-11 DIAGNOSIS — Z20.822 ENCOUNTER FOR PREPROCEDURAL LABORATORY EXAMINATION: ICD-10-CM

## 2022-10-11 DIAGNOSIS — T84.018A BROKEN INTERNAL JOINT PROSTHESIS, OTHER SITE, INITIAL ENCOUNTER: ICD-10-CM

## 2022-10-11 DIAGNOSIS — M25.361 OTHER INSTABILITY, RIGHT KNEE: ICD-10-CM

## 2022-10-11 DIAGNOSIS — Z12.11 ENCOUNTER FOR SCREENING FOR MALIGNANT NEOPLASM OF COLON: ICD-10-CM

## 2022-10-11 DIAGNOSIS — Z96.651 PRESENCE OF RIGHT ARTIFICIAL KNEE JOINT: Chronic | ICD-10-CM

## 2022-10-11 DIAGNOSIS — Z96.652 PRESENCE OF LEFT ARTIFICIAL KNEE JOINT: Chronic | ICD-10-CM

## 2022-10-11 DIAGNOSIS — Z01.812 ENCOUNTER FOR PREPROCEDURAL LABORATORY EXAMINATION: ICD-10-CM

## 2022-10-11 DIAGNOSIS — K21.00 GASTRO-ESOPHAGEAL REFLUX DISEASE WITH ESOPHAGITIS, WITHOUT BLEEDING: ICD-10-CM

## 2022-10-11 DIAGNOSIS — Z96.659 BROKEN INTERNAL JOINT PROSTHESIS, OTHER SITE, INITIAL ENCOUNTER: ICD-10-CM

## 2022-10-11 PROCEDURE — 99213 OFFICE O/P EST LOW 20 MIN: CPT

## 2022-10-11 PROCEDURE — 73700 CT LOWER EXTREMITY W/O DYE: CPT

## 2022-10-11 PROCEDURE — 99215 OFFICE O/P EST HI 40 MIN: CPT | Mod: 25

## 2022-10-11 PROCEDURE — 73700 CT LOWER EXTREMITY W/O DYE: CPT | Mod: 26,RT,MH

## 2022-10-11 PROCEDURE — 20610 DRAIN/INJ JOINT/BURSA W/O US: CPT

## 2022-10-11 RX ORDER — LEVOTHYROXINE SODIUM 50 UG/1
50 TABLET ORAL
Qty: 90 | Refills: 0 | Status: DISCONTINUED | COMMUNITY
Start: 2022-03-11 | End: 2022-10-11

## 2022-10-11 RX ORDER — FLUTICASONE PROPIONATE 50 UG/1
50 SPRAY, METERED NASAL DAILY
Qty: 1 | Refills: 2 | Status: DISCONTINUED | COMMUNITY
Start: 2020-06-01 | End: 2022-10-11

## 2022-10-11 RX ORDER — SIMVASTATIN 10 MG/1
10 TABLET, FILM COATED ORAL
Refills: 0 | Status: DISCONTINUED | COMMUNITY
End: 2022-10-11

## 2022-10-11 RX ORDER — OXYCODONE AND ACETAMINOPHEN 5; 325 MG/1; MG/1
5-325 TABLET ORAL
Qty: 15 | Refills: 0 | Status: DISCONTINUED | COMMUNITY
Start: 2022-04-05 | End: 2022-10-11

## 2022-10-11 RX ORDER — MULTIVIT-MIN/IRON/FOLIC ACID/K 18-600-40
CAPSULE ORAL
Refills: 0 | Status: ACTIVE | COMMUNITY

## 2022-10-11 RX ORDER — OMEPRAZOLE 40 MG/1
40 CAPSULE, DELAYED RELEASE ORAL
Qty: 90 | Refills: 0 | Status: DISCONTINUED | COMMUNITY
Start: 2022-03-11 | End: 2022-10-11

## 2022-10-11 NOTE — REASON FOR VISIT
[Follow-Up Visit] : a follow-up visit for [Artificial Knee Joint] : an artificial knee joint [Knee Pain] : knee pain [Family Member] : family member

## 2022-10-12 LAB
B PERT IGG+IGM PNL SER: ABNORMAL
COLOR FLD: NORMAL
CRP SERPL-MCNC: <3 MG/L
EOSINOPHIL # FLD MANUAL: 0 %
ERYTHROCYTE [SEDIMENTATION RATE] IN BLOOD BY WESTERGREN METHOD: 16 MM/HR
FLUID INTAKE SUBSTANCE CLASS: NORMAL
LYMPHOCYTES # FLD MANUAL: 58 %
MESOTHL CELL NFR FLD: 0 %
MONOS+MACROS NFR FLD MANUAL: 34 %
NEUTS SEG # FLD MANUAL: 8 %
NRBC # FLD: 0 %
RBC # FLD MANUAL: 9000 /UL
SYCRY CLARITY: NORMAL
SYCRY COLOR: NORMAL
SYCRY ID: NORMAL
SYCRY TUBE: NORMAL
TOTAL CELLS COUNTED FLD: 706 /UL
TUBE TYPE: NORMAL
UNIDENT CELLS NFR FLD MANUAL: 0 %
VARIANT LYMPHS # FLD MANUAL: 0 %

## 2022-10-13 NOTE — ASSESSMENT
[FreeTextEntry1] : Patient with a history of grade 4 reflux esophagitis who is doing well clinically on pantoprazole 40 mg a day.  He is in need of a screening colonoscopy.\par \par An EGD and colonoscopy have been scheduled. The risks, benefits, alternatives, and limitations of the procedures, including the possibility of missed lesions, were explained.\par \par Patient will continue pantoprazole 40 mg a day.\par \par \par Plan from 5/27/2020 - Patient status post treatment for H. pylori gastritis.  He is on pantoprazole 40 mg a day for reflux esophagitis.  He denies heartburn but does have throat clearing as well as sinus and nasal congestion.\par \par Patient will go for an H. pylori breath test off of pantoprazole for 2 weeks.  The patient was advised that he may develop heartburn during this.  And that he can use Tums as needed.  He was also advised to be especially careful with his diet when he is off the pantoprazole.\par \par Patient was advised to see an ENT specialist to evaluate the congestion and throat clearing.\par \par Patient is due for a screening colonoscopy next year.\par \par \par Plan from 12/18/2019 - Patient with H. pylori gastritis, grade 4 reflux esophagitis, and a hiatal hernia.\par \par We will treat the H. pylori with tetracycline 500 mg 3 times daily, metronidazole 500 mg 3 times daily, Pepto-Bismol 2 tablets 3 times a day, and pantoprazole 40 mg twice daily for 14 days.  I had a long discussion with the patient regarding potential side effects of the medication and the need to avoid alcohol for the entirety of the treatment and for 3 days afterwards.\par \par Patient will then continue pantoprazole 40 mg a day.\par \par Patient will return to see me in 6 to 8 weeks to assess for eradication.\par \par Patient is due for a colonoscopy next year.\par \par \par Plan from 10/31/2019 - Patient with symptoms of intermittent heartburn and hiccups.\par \par An EGD has been scheduled. The risks, benefits, alternatives, and limitations of the procedure were explained.\par \par Patient will require a colonoscopy in 2020.

## 2022-10-13 NOTE — CONSULT LETTER
[FreeTextEntry1] : Dear Dr. Eugenia Estevez,\par \par I had the pleasure of seeing your patient GLENNY GARCIAS in the office today.  My office note is attached. PLEASE READ THE "ASSESSMENT" SECTION OF THE NOTE TO SEE MY IMPRESSION AND PLAN.\par \par Thank you very much for allowing me to participate in the care of your patient.\par \par Sincerely,\par \par Tio Mackay M.D., FAC, FACP\par Director, Celiac Program at [facility]\par  of Medicine, [employer] at [facility] Adjunct  of Medicine, [facility] Practice Director, [facility] - Gastroenterology at [place]\[facility] [address]\[place] [address]\par Tel: [phone]\par Email: [email or web address]\par \par \par The attached note has been created using a voice recognition system (Dragon).  There may be some misspellings and typos.  Please call my office if you have any issues or questions.

## 2022-10-13 NOTE — PHYSICAL EXAM
[de-identified] : Patient is well nourished, well-developed, in no acute distress, with appropriate mood and affect. The patient is oriented to time, place, and person. Respirations are even and unlabored. Gait evaluation does reveal a limp. There is no inguinal adenopathy. Bilateral limbs are well-perfused, without skin lesions, shows a grossly normal motor and sensory examination. The right knee motion is significantly reduced and does cause significant pain. The right knee moves from 0 to 125 degrees. The knee is stable within that range-of-motion to AP and ML stress. The alignment of the knee is neutral.  Patella clunk and obvious maltracking noted as I go from extension to flexion of flexion to extension.  The patella is laterally tracking and popping out of the trochlear groove.  Well-healed midline surgical scar.  Muscle strength is normal. Pedal pulses are palpable. Hip examination was negative. The left knee motion is painless and the left knee moves from 0 to 125 degrees. The knee is stable within that range-of-motion to AP and ML stress. The alignment of the knee is neutral.  Well-healed midline surgical scar.  Muscle strength is normal. Pedal pulses are palpable. Hip examination was negative. [de-identified] : AP, lateral, tunnel, and sunrise knee x-rays of the bilateral knee were brought in by the patient which I reviewed and demonstrate satisfactory position and alignment of the components are present. No signs of loosening are seen.  There is lateral patellar tracking noted in the right knee which is worsening from previous imaging and much worse than 2 years ago.

## 2022-10-13 NOTE — PROCEDURE
[de-identified] : Informed consent for the right knee aspiration was obtained. All risks, benefits and alternatives were discussed. These included but were not limited to bleeding, infection, allergic reaction and reaccumulation of fluid.  All questions were answered. A time out was performed. The right knee was prepped and draped in sterile fashion. Using sterile technique, the right knee was aspirated of approximately 20 cc of clear yellow fluid using a 18-gauge needle. A sterile dressing was applied. Post aspiration instructions were reviewed. The patient tolerated the procedure well.  Sent to lab for cell count, culture and gram stain

## 2022-10-13 NOTE — HISTORY OF PRESENT ILLNESS
[FreeTextEntry1] : The patient has a history of grade 4 reflux esophagitis.  We reviewed the results of his H. pylori breath test performed on May 27, 2020 which was negative.  The patient has been on pantoprazole 40 mg a day.  He denies heartburn, dysphagia, abdominal pain.  He does note abdominal bloating.  He reports 3 solid bowel movements a day without melena or bright red blood per rectum.  The patient has gained 5 pounds.  His last colonoscopy was in March 2013.\par \par \par Note from 5/27/2020 - The patient is status post treatment for H. pylori with tetracycline, metronidazole, Pepto-Bismol, and pantoprazole for 14 days.  He is now on pantoprazole once a day for reflux esophagitis.  He denies heartburn, dysphasia, or abdominal pain.  He does however have frequent throat clearing.  He also notes nasal and sinus congestion.  Bowel movements are normal with 2 bowel movements a day.  He denies melena or bright red blood per rectum.  He has lost 8 pounds intentionally.\par \par \par Note from 12/18/2019 - The patient is status post EGD performed on November 15, 2019.  The examination was significant for a 1 cm hiatal hernia with grade 4 reflux esophagitis with linear ulcers and moderate gastritis.  Biopsies showed reflux esophagitis and the presence of H. pylori.  The patient has been on pantoprazole 40 mg a day and feels much better denying heartburn, dysphasia, or hiccups.\par \par \par Note from 10/31/2019 - The patient is a 68-year-old man who reports intermittent symptoms of heartburn usually with spicy foods.  He also gets intermittent episodes of hiccups.  He takes Protonix about twice a month.  He denies dysphasia or abdominal pain.  He has one solid bowel movement a day without melena or bright red blood per rectum.  The patient's weight is stable.  His last colonoscopy was in 2015.  He had a knee replacement 1 year ago.  Otherwise, the patient has not been hospitalized in the past year and denies any cardiac issues.

## 2022-10-13 NOTE — DISCUSSION/SUMMARY
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  This could be due to implant positioning.  This is acutely worse since the arthroscopic lateral release and lysis of adhesions.  He has failed a course of conservative management and would like me to proceed with a right total knee arthroplasty revision using robotic navigation for assistance.\par \par The patient is an appropriate candidate for consideration of right revision total knee arthroplasty. This recommendation is based on the patient's pain, function, and bone stock. An extensive discussion was conducted of the natural history of this particular problem and the variety of surgical and non-surgical treatment options available to the patient. A risk/benefit analysis was discussed with the patient reviewing the advantages and disadvantages of surgical intervention at this time. A full explanation was given of the nature and the purpose of the procedure and anesthesia, its benefits, possible alternative methods of diagnosis or treatment, the risks involved, the possibility of complications, the foreseeable consequences of the procedure and the possible results of the non-treatment. I reviewed the plan of care and I also used a model of a revision joint replacement implant equivalent to the one that will be used for their revision total joint replacement. The ability to secure the implant utilizing cement or cementless (press-fit) was discussed with the patient. The patient agrees with the plan of care, as well as the use of implants for their revision joint replacement. \par \par No guarantee or assurance was made as to the results that may be obtained. Specifically, the risks were identified to include, but are not limited to the following: Infection, phlebitis, pulmonary embolism, death, paralysis, dislocation, pain, stiffness, instability, limp, weakness, breakage, leg-length inequality, uncontrolled bleeding, nerve injury, blood vessel injury, pressure sores, anesthetic risks, delayed healing of wound and bone, and wear and loosening. Further discussion was undertaken with the patient about the details of surgical preparation, treatment, and postoperative rehabilitation including medical clearance, autotransfusion, the hospital course, and the postoperative rehabilitation involved. Reimplantation may require cemented or cementless components, or both, depending upon a variety of factors that must be assessed at the time of surgery. The need for bone graft (either autograft or allograft) to enhance the chance for success of the procedure(s) was discussed. All in all, I feel that this patient is a good candidate for surgical reconstruction.\par \par The patient and I discussed the current SARS-CoV-2 (COVID-19) pandemic which has affected our local hospitals. We discussed that our hospitals treat patients with COVID-19. All efforts will be made to avoid cohorting the patient with diagnosed or suspected COVID-19 patient. They also understand that we will screen them 24-48 hours prior to surgery. Despite our best efforts, there is a potential risk for iatrogenic transmission of COVID-19 to the patient during the perioperative period. Brandy COVID-19 during the perioperative period may increase the patient´s risks of an adverse outcome including postoperative pneumonia, difficulty breathing, requirement for a breathing tube (general endotracheal intubation), and death. The patient is understanding of this risk, and is willing to proceed with surgery at this time.

## 2022-10-13 NOTE — HISTORY OF PRESENT ILLNESS
[de-identified] : This is very nice 71-year-old gentleman experiencing right knee pain, which is severe in intensity.  History of right total knee arthroplasty in 2016 by .  This was complicated by chronic pain and some patella maltracking and underwent arthroscopic lateral release April 8, 2022 which did not help and in fact made the pain worse.  He is now having instability with stairs.  He notes clicking in the knee.  It is getting stuck in position.  Had a successful left total knee arthroplasty with the same surgeon a few years ago.  Physical therapy not helped the right knee.  The right knee is giving way.  NSAIDs do not help.  Use ambulatory assistive devices such as canes.  The patient denies any radiation of the pain to the feet and it is not associated with numbness, tingling, or weakness.  He would like to transfer his care to me now.

## 2022-10-25 LAB — BACTERIA FLD CULT: NORMAL

## 2022-10-25 NOTE — ASU PREOP CHECKLIST - NS PREOP CHK TEST_COVID_DT_GEN_ALL_CORE
03-Apr-2022 08:30 Dapsone Pregnancy And Lactation Text: This medication is Pregnancy Category C and is not considered safe during pregnancy or breast feeding.

## 2022-11-10 LAB — FUNGUS FLD CULT: NORMAL

## 2022-12-14 RX ORDER — POLYETHYLENE GLYCOL-3350 AND ELECTROLYTES WITH FLAVOR PACK 240; 5.84; 2.98; 6.72; 22.72 G/278.26G; G/278.26G; G/278.26G; G/278.26G; G/278.26G
240 POWDER, FOR SOLUTION ORAL
Qty: 1 | Refills: 0 | Status: ACTIVE | COMMUNITY
Start: 2022-12-14 | End: 1900-01-01

## 2022-12-23 ENCOUNTER — APPOINTMENT (OUTPATIENT)
Dept: GASTROENTEROLOGY | Facility: AMBULATORY MEDICAL SERVICES | Age: 72
End: 2022-12-23

## 2022-12-23 PROCEDURE — 45380 COLONOSCOPY AND BIOPSY: CPT | Mod: 59

## 2022-12-23 PROCEDURE — 43239 EGD BIOPSY SINGLE/MULTIPLE: CPT | Mod: 59

## 2022-12-23 PROCEDURE — 45385 COLONOSCOPY W/LESION REMOVAL: CPT | Mod: PT

## 2023-01-23 ENCOUNTER — APPOINTMENT (OUTPATIENT)
Dept: GASTROENTEROLOGY | Facility: CLINIC | Age: 73
End: 2023-01-23
Payer: MEDICARE

## 2023-01-23 VITALS
HEART RATE: 72 BPM | DIASTOLIC BLOOD PRESSURE: 82 MMHG | OXYGEN SATURATION: 97 % | BODY MASS INDEX: 30.62 KG/M2 | WEIGHT: 172.8 LBS | HEIGHT: 63 IN | SYSTOLIC BLOOD PRESSURE: 152 MMHG | TEMPERATURE: 97.5 F

## 2023-01-23 DIAGNOSIS — K64.4 RESIDUAL HEMORRHOIDAL SKIN TAGS: ICD-10-CM

## 2023-01-23 DIAGNOSIS — K22.70 BARRETT'S ESOPHAGUS W/OUT DYSPLASIA: ICD-10-CM

## 2023-01-23 DIAGNOSIS — K57.30 DIVERTICULOSIS OF LARGE INTESTINE W/OUT PERFORATION OR ABSCESS W/OUT BLEEDING: ICD-10-CM

## 2023-01-23 DIAGNOSIS — K64.8 OTHER HEMORRHOIDS: ICD-10-CM

## 2023-01-23 DIAGNOSIS — D12.6 BENIGN NEOPLASM OF COLON, UNSPECIFIED: ICD-10-CM

## 2023-01-23 PROCEDURE — 99214 OFFICE O/P EST MOD 30 MIN: CPT

## 2023-01-23 RX ORDER — SODIUM SULFATE, POTASSIUM SULFATE AND MAGNESIUM SULFATE 1.6; 3.13; 17.5 G/177ML; G/177ML; G/177ML
17.5-3.13-1.6 SOLUTION ORAL
Qty: 1 | Refills: 0 | Status: DISCONTINUED | COMMUNITY
Start: 2022-10-11 | End: 2023-01-23

## 2023-01-23 NOTE — ASSESSMENT
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  He also had 3 adenomatous colonic polyps and has moderate sigmoid diverticulosis.  He is feeling well on pantoprazole 40 mg a day.\par \par I explained to the patient the significance of the finding of Mcwilliams's esophagus.  We will continue pantoprazole 40 mg a day.\par \par Information was given to the patient regarding diverticulosis and a high-fiber diet.\par \par We will repeat EGD and colonoscopy in 3 years that is December 2025.\par \par Patient will return to see me in 1 year or sooner if needed.\par \par \par Plan from 10/11/2022 - Patient with a history of grade 4 reflux esophagitis who is doing well clinically on pantoprazole 40 mg a day.  He is in need of a screening colonoscopy.\par \par An EGD and colonoscopy have been scheduled. The risks, benefits, alternatives, and limitations of the procedures, including the possibility of missed lesions, were explained.\par \par Patient will continue pantoprazole 40 mg a day.\par \par \par Plan from 5/27/2020 - Patient status post treatment for H. pylori gastritis.  He is on pantoprazole 40 mg a day for reflux esophagitis.  He denies heartburn but does have throat clearing as well as sinus and nasal congestion.\par \par Patient will go for an H. pylori breath test off of pantoprazole for 2 weeks.  The patient was advised that he may develop heartburn during this.  And that he can use Tums as needed.  He was also advised to be especially careful with his diet when he is off the pantoprazole.\par \par Patient was advised to see an ENT specialist to evaluate the congestion and throat clearing.\par \par Patient is due for a screening colonoscopy next year.\par \par \par Plan from 12/18/2019 - Patient with H. pylori gastritis, grade 4 reflux esophagitis, and a hiatal hernia.\par \par We will treat the H. pylori with tetracycline 500 mg 3 times daily, metronidazole 500 mg 3 times daily, Pepto-Bismol 2 tablets 3 times a day, and pantoprazole 40 mg twice daily for 14 days.  I had a long discussion with the patient regarding potential side effects of the medication and the need to avoid alcohol for the entirety of the treatment and for 3 days afterwards.\par \par Patient will then continue pantoprazole 40 mg a day.\par \par Patient will return to see me in 6 to 8 weeks to assess for eradication.\par \par Patient is due for a colonoscopy next year.\par \par \par Plan from 10/31/2019 - Patient with symptoms of intermittent heartburn and hiccups.\par \par An EGD has been scheduled. The risks, benefits, alternatives, and limitations of the procedure were explained.\par \par Patient will require a colonoscopy in 2020.

## 2023-01-23 NOTE — CONSULT LETTER
[FreeTextEntry1] : Dear Dr. Eugenia Estevez,\par \par I had the pleasure of seeing your patient GLENNY GARCIAS in the office today.  My office note is attached. PLEASE READ THE "ASSESSMENT" SECTION OF THE NOTE TO SEE MY IMPRESSION AND PLAN.\par \par Thank you very much for allowing me to participate in the care of your patient.\par \par Sincerely,\par \par Tio Mackay M.D., FAC, FACP\par Director, Celiac Program at [facility]\par  of Medicine, [employer] at [facility] Adjunct  of Medicine, [facility] Practice Director, [facility] - Gastroenterology at [place]\[facility] [address]\[place] [address]\par Tel: [phone]\par Email: [email or web address]\par \par \par The attached note has been created using a voice recognition system (Dragon).  There may be some misspellings and typos.  Please call my office if you have any issues or questions.

## 2023-01-23 NOTE — HISTORY OF PRESENT ILLNESS
[FreeTextEntry1] : The patient has a history of grade 4 reflux esophagitis and has been on pantoprazole 40 mg a day.  We reviewed the EGD and colonoscopy performed on December 23, 2022.  EGD revealed improvement of the reflux esophagitis but now showed evidence of Mcwilliams's appearing mucosa with intestinal metaplasia extending for 2 to 3 cm.  Biopsies were otherwise negative.  Colonoscopy was significant for removal of 7 polyps, 3 tubular adenomas and 4 hyperplastic.  The patient also has moderate diverticulosis involving the sigmoid colon as well as internal and external hemorrhoids which are asymptomatic.  The patient is feeling well on pantoprazole 40 mg a day.  He denies heartburn, dysphagia, nausea, vomiting, abdominal pain.  He reports 2-3 solid bowel movements a day without melena or bright red blood per rectum.\par \par \par Note from 10/11/2022 - The patient has a history of grade 4 reflux esophagitis.  We reviewed the results of his H. pylori breath test performed on May 27, 2020 which was negative.  The patient has been on pantoprazole 40 mg a day.  He denies heartburn, dysphagia, abdominal pain.  He does note abdominal bloating.  He reports 3 solid bowel movements a day without melena or bright red blood per rectum.  The patient has gained 5 pounds.  His last colonoscopy was in March 2013.\par \par \par Note from 5/27/2020 - The patient is status post treatment for H. pylori with tetracycline, metronidazole, Pepto-Bismol, and pantoprazole for 14 days.  He is now on pantoprazole once a day for reflux esophagitis.  He denies heartburn, dysphasia, or abdominal pain.  He does however have frequent throat clearing.  He also notes nasal and sinus congestion.  Bowel movements are normal with 2 bowel movements a day.  He denies melena or bright red blood per rectum.  He has lost 8 pounds intentionally.\par \par \par Note from 12/18/2019 - The patient is status post EGD performed on November 15, 2019.  The examination was significant for a 1 cm hiatal hernia with grade 4 reflux esophagitis with linear ulcers and moderate gastritis.  Biopsies showed reflux esophagitis and the presence of H. pylori.  The patient has been on pantoprazole 40 mg a day and feels much better denying heartburn, dysphasia, or hiccups.\par \par \par Note from 10/31/2019 - The patient is a 68-year-old man who reports intermittent symptoms of heartburn usually with spicy foods.  He also gets intermittent episodes of hiccups.  He takes Protonix about twice a month.  He denies dysphasia or abdominal pain.  He has one solid bowel movement a day without melena or bright red blood per rectum.  The patient's weight is stable.  His last colonoscopy was in 2015.  He had a knee replacement 1 year ago.  Otherwise, the patient has not been hospitalized in the past year and denies any cardiac issues.

## 2023-02-17 ENCOUNTER — OUTPATIENT (OUTPATIENT)
Dept: OUTPATIENT SERVICES | Facility: HOSPITAL | Age: 73
LOS: 1 days | End: 2023-02-17
Payer: MEDICARE

## 2023-02-17 VITALS
OXYGEN SATURATION: 97 % | HEART RATE: 87 BPM | DIASTOLIC BLOOD PRESSURE: 72 MMHG | TEMPERATURE: 98 F | WEIGHT: 173.94 LBS | SYSTOLIC BLOOD PRESSURE: 125 MMHG | RESPIRATION RATE: 17 BRPM | HEIGHT: 63 IN

## 2023-02-17 DIAGNOSIS — T84.092A OTHER MECHANICAL COMPLICATION OF INTERNAL RIGHT KNEE PROSTHESIS, INITIAL ENCOUNTER: ICD-10-CM

## 2023-02-17 DIAGNOSIS — Z96.652 PRESENCE OF LEFT ARTIFICIAL KNEE JOINT: Chronic | ICD-10-CM

## 2023-02-17 DIAGNOSIS — Z96.651 PRESENCE OF RIGHT ARTIFICIAL KNEE JOINT: Chronic | ICD-10-CM

## 2023-02-17 DIAGNOSIS — G47.33 OBSTRUCTIVE SLEEP APNEA (ADULT) (PEDIATRIC): ICD-10-CM

## 2023-02-17 DIAGNOSIS — T84.022A INSTABILITY OF INTERNAL RIGHT KNEE PROSTHESIS, INITIAL ENCOUNTER: ICD-10-CM

## 2023-02-17 DIAGNOSIS — Z01.818 ENCOUNTER FOR OTHER PREPROCEDURAL EXAMINATION: ICD-10-CM

## 2023-02-17 DIAGNOSIS — T84.022D INSTABILITY OF INTERNAL RIGHT KNEE PROSTHESIS, SUBSEQUENT ENCOUNTER: ICD-10-CM

## 2023-02-17 PROCEDURE — 86900 BLOOD TYPING SEROLOGIC ABO: CPT

## 2023-02-17 PROCEDURE — 73700 CT LOWER EXTREMITY W/O DYE: CPT | Mod: ME

## 2023-02-17 PROCEDURE — 87640 STAPH A DNA AMP PROBE: CPT

## 2023-02-17 PROCEDURE — G1004: CPT

## 2023-02-17 PROCEDURE — 86850 RBC ANTIBODY SCREEN: CPT

## 2023-02-17 PROCEDURE — G0463: CPT

## 2023-02-17 PROCEDURE — 83036 HEMOGLOBIN GLYCOSYLATED A1C: CPT

## 2023-02-17 PROCEDURE — 86901 BLOOD TYPING SEROLOGIC RH(D): CPT

## 2023-02-17 PROCEDURE — 80048 BASIC METABOLIC PNL TOTAL CA: CPT

## 2023-02-17 PROCEDURE — 73700 CT LOWER EXTREMITY W/O DYE: CPT | Mod: 26,RT,ME

## 2023-02-17 PROCEDURE — 36415 COLL VENOUS BLD VENIPUNCTURE: CPT

## 2023-02-17 PROCEDURE — 87641 MR-STAPH DNA AMP PROBE: CPT

## 2023-02-17 PROCEDURE — 85027 COMPLETE CBC AUTOMATED: CPT

## 2023-02-17 RX ORDER — LIDOCAINE HCL 20 MG/ML
0.2 VIAL (ML) INJECTION ONCE
Refills: 0 | Status: DISCONTINUED | OUTPATIENT
Start: 2023-03-10 | End: 2023-03-10

## 2023-02-17 RX ORDER — SODIUM CHLORIDE 9 MG/ML
3 INJECTION INTRAMUSCULAR; INTRAVENOUS; SUBCUTANEOUS EVERY 8 HOURS
Refills: 0 | Status: DISCONTINUED | OUTPATIENT
Start: 2023-03-10 | End: 2023-03-10

## 2023-02-17 RX ORDER — CHLORHEXIDINE GLUCONATE 213 G/1000ML
1 SOLUTION TOPICAL ONCE
Refills: 0 | Status: DISCONTINUED | OUTPATIENT
Start: 2023-03-10 | End: 2023-03-10

## 2023-02-17 NOTE — H&P PST ADULT - HISTORY OF PRESENT ILLNESS
72 yr old male with hx of HTN, Obesity, BPH, Unilateral osteoarthritis Right Knee s/p right total knee in 2016. Now with instability of prosthesis referred for revision     *COVID  denies s/s   denies foreign travel  denies known exposure  denies infection  swab 3/7 Rakan

## 2023-02-17 NOTE — H&P PST ADULT - NSICDXPROCEDURE_GEN_ALL_CORE_FT
PROCEDURES:  Right total knee replacement 17-Feb-2023 16:18:10  Karis Asher  Revision, total arthroplasty, knee, robot-assisted, using ALLYSON system 17-Feb-2023 16:18:40  Karis Asher

## 2023-02-17 NOTE — H&P PST ADULT - NSICDXPASTMEDICALHX_GEN_ALL_CORE_FT
PAST MEDICAL HISTORY:  BPH (benign prostatic hyperplasia)     Class 1 obesity with body mass index (BMI) of 32.0 to 32.9 in adult     GERD (gastroesophageal reflux disease)     Hyperlipidemia     Hypothyroid     Instability of internal right knee prosthesis     Osteoarthritis     Sleep apnea CPAP

## 2023-02-17 NOTE — H&P PST ADULT - MUSCULOSKELETAL
details… no calf tenderness/decreased ROM due to pain/strength 5/5 bilateral upper extremities/decreased strength/abnormal gait

## 2023-02-17 NOTE — H&P PST ADULT - ASSESSMENT
DASI: walks flat surface, has difficulty with stairs due to knee issues Mets 6  Symptoms : Denies SOB, VILLARREAL, palpitations  Airway : no airway abnormalities , denies prior anesthesia complications   Mallampati : 3  Denies loose teeth    Corneal abrasion risk : Denies     CAPRINI SCORE [CLOT]    AGE RELATED RISK FACTORS                                                       MOBILITY RELATED FACTORS  [ ] Age 41-60 years                                            (1 Point)                  [ ] Bed rest                                                        (1 Point)  [x ] Age: 61-74 years                                           (2 Points)                 [ ] Plaster cast                                                   (2 Points)  [ ] Age= 75 years                                              (3 Points)                 [ ] Bed bound for more than 72 hours                 (2 Points)    DISEASE RELATED RISK FACTORS                                               GENDER SPECIFIC FACTORS  [ ] Edema in the lower extremities                       (1 Point)                  [ ] Pregnancy                                                     (1 Point)  [ ] Varicose veins                                               (1 Point)                  [ ] Post-partum < 6 weeks                                   (1 Point)             [ x] BMI > 25 Kg/m2                                            (1 Point)                  [ ] Hormonal therapy  or oral contraception          (1 Point)                 [ ] Sepsis (in the previous month)                        (1 Point)                  [ ] History of pregnancy complications                 (1 point)  [ ] Pneumonia or serious lung disease                                               [ ] Unexplained or recurrent                     (1 Point)           (in the previous month)                               (1 Point)  [ ] Abnormal pulmonary function test                     (1 Point)                 SURGERY RELATED RISK FACTORS  [ ] Acute myocardial infarction                              (1 Point)                 [ ]  Section                                             (1 Point)  [ ] Congestive heart failure (in the previous month)  (1 Point)               [ ] Minor surgery                                                  (1 Point)   [ ] Inflammatory bowel disease                             (1 Point)                 [ ] Arthroscopic surgery                                        (2 Points)  [ ] Central venous access                                      (2 Points)                [ ] General surgery lasting more than 45 minutes   (2 Points)       [ ] Stroke (in the previous month)                          (5 Points)               [ x] Elective arthroplasty                                         (5 Points)                                                                                                                                               HEMATOLOGY RELATED FACTORS                                                 TRAUMA RELATED RISK FACTORS  [ ] Prior episodes of VTE                                     (3 Points)                [ ] Fracture of the hip, pelvis, or leg                       (5 Points)  [ ] Positive family history for VTE                         (3 Points)                 [ ] Acute spinal cord injury (in the previous month)  (5 Points)  [ ] Prothrombin 09679 A                                     (3 Points)                 [ ] Paralysis  (less than 1 month)                             (5 Points)  [ ] Factor V Leiden                                             (3 Points)                  [ ] Multiple Trauma within 1 month                        (5 Points)  [ ] Lupus anticoagulants                                     (3 Points)                                                           [ ] Anticardiolipin antibodies                               (3 Points)                                                       [ ] High homocysteine in the blood                      (3 Points)                                             [ ] Other congenital or acquired thrombophilia      (3 Points)                                                [ ] Heparin induced thrombocytopenia                  (3 Points)                                          Total Score [      8    ]    Caprini Score 0 - 2:  Low Risk, No VTE Prophylaxis required for most patients, encourage ambulation  Caprini Score 3 - 6:  At Risk, pharmacologic VTE prophylaxis is indicated for most patients (in the absence of a contraindication)  Caprini Score Greater than or = 7:  High Risk, pharmacologic VTE prophylaxis is indicated for most patients (in the absence of a contraindication)

## 2023-02-21 ENCOUNTER — NON-APPOINTMENT (OUTPATIENT)
Age: 73
End: 2023-02-21

## 2023-02-21 PROBLEM — T84.022A INSTABILITY OF INTERNAL RIGHT KNEE PROSTHESIS, INITIAL ENCOUNTER: Chronic | Status: ACTIVE | Noted: 2023-02-17

## 2023-03-01 DIAGNOSIS — M17.0 BILATERAL PRIMARY OSTEOARTHRITIS OF KNEE: ICD-10-CM

## 2023-03-02 ENCOUNTER — NON-APPOINTMENT (OUTPATIENT)
Age: 73
End: 2023-03-02

## 2023-03-02 RX ORDER — MUPIROCIN 20 MG/G
2 OINTMENT TOPICAL
Qty: 1 | Refills: 0 | Status: ACTIVE | COMMUNITY
Start: 2023-03-02 | End: 1900-01-01

## 2023-03-07 ENCOUNTER — OUTPATIENT (OUTPATIENT)
Dept: OUTPATIENT SERVICES | Facility: HOSPITAL | Age: 73
LOS: 1 days | End: 2023-03-07

## 2023-03-07 DIAGNOSIS — Z96.651 PRESENCE OF RIGHT ARTIFICIAL KNEE JOINT: Chronic | ICD-10-CM

## 2023-03-07 DIAGNOSIS — Z96.652 PRESENCE OF LEFT ARTIFICIAL KNEE JOINT: Chronic | ICD-10-CM

## 2023-03-07 DIAGNOSIS — Z11.52 ENCOUNTER FOR SCREENING FOR COVID-19: ICD-10-CM

## 2023-03-07 LAB — SARS-COV-2 RNA SPEC QL NAA+PROBE: SIGNIFICANT CHANGE UP

## 2023-03-09 ENCOUNTER — TRANSCRIPTION ENCOUNTER (OUTPATIENT)
Age: 73
End: 2023-03-09

## 2023-03-10 ENCOUNTER — INPATIENT (INPATIENT)
Facility: HOSPITAL | Age: 73
LOS: 1 days | Discharge: HOME | DRG: 465 | End: 2023-03-12
Attending: ORTHOPAEDIC SURGERY | Admitting: ORTHOPAEDIC SURGERY
Payer: MEDICARE

## 2023-03-10 ENCOUNTER — TRANSCRIPTION ENCOUNTER (OUTPATIENT)
Age: 73
End: 2023-03-10

## 2023-03-10 ENCOUNTER — APPOINTMENT (OUTPATIENT)
Dept: ORTHOPEDIC SURGERY | Facility: HOSPITAL | Age: 73
End: 2023-03-10

## 2023-03-10 VITALS
RESPIRATION RATE: 18 BRPM | HEART RATE: 75 BPM | HEIGHT: 63 IN | TEMPERATURE: 98 F | OXYGEN SATURATION: 98 % | SYSTOLIC BLOOD PRESSURE: 152 MMHG | DIASTOLIC BLOOD PRESSURE: 88 MMHG | WEIGHT: 173.94 LBS

## 2023-03-10 DIAGNOSIS — T84.092A OTHER MECHANICAL COMPLICATION OF INTERNAL RIGHT KNEE PROSTHESIS, INITIAL ENCOUNTER: ICD-10-CM

## 2023-03-10 DIAGNOSIS — T84.022A INSTABILITY OF INTERNAL RIGHT KNEE PROSTHESIS, INITIAL ENCOUNTER: ICD-10-CM

## 2023-03-10 DIAGNOSIS — Z96.652 PRESENCE OF LEFT ARTIFICIAL KNEE JOINT: Chronic | ICD-10-CM

## 2023-03-10 DIAGNOSIS — Z96.651 PRESENCE OF RIGHT ARTIFICIAL KNEE JOINT: Chronic | ICD-10-CM

## 2023-03-10 PROCEDURE — 27486 REVISE/REPLACE KNEE JOINT: CPT | Mod: RT

## 2023-03-10 PROCEDURE — 73560 X-RAY EXAM OF KNEE 1 OR 2: CPT | Mod: 26,RT

## 2023-03-10 DEVICE — IMPLANTABLE DEVICE: Type: IMPLANTABLE DEVICE | Site: RIGHT | Status: FUNCTIONAL

## 2023-03-10 DEVICE — MAKO BONE PIN 4MM X 140MM: Type: IMPLANTABLE DEVICE | Site: RIGHT | Status: FUNCTIONAL

## 2023-03-10 DEVICE — BAR TIBIAL LOCKING MODULAR: Type: IMPLANTABLE DEVICE | Site: RIGHT | Status: FUNCTIONAL

## 2023-03-10 DEVICE — MAKO BONE PIN 4MM X 110MM: Type: IMPLANTABLE DEVICE | Site: RIGHT | Status: FUNCTIONAL

## 2023-03-10 RX ORDER — LEVOTHYROXINE SODIUM 125 MCG
75 TABLET ORAL DAILY
Refills: 0 | Status: DISCONTINUED | OUTPATIENT
Start: 2023-03-10 | End: 2023-03-12

## 2023-03-10 RX ORDER — ATORVASTATIN CALCIUM 80 MG/1
40 TABLET, FILM COATED ORAL AT BEDTIME
Refills: 0 | Status: DISCONTINUED | OUTPATIENT
Start: 2023-03-10 | End: 2023-03-12

## 2023-03-10 RX ORDER — SENNA PLUS 8.6 MG/1
2 TABLET ORAL AT BEDTIME
Refills: 0 | Status: DISCONTINUED | OUTPATIENT
Start: 2023-03-10 | End: 2023-03-12

## 2023-03-10 RX ORDER — FINASTERIDE 5 MG/1
1 TABLET, FILM COATED ORAL
Qty: 0 | Refills: 0 | DISCHARGE

## 2023-03-10 RX ORDER — ACETAMINOPHEN 500 MG
975 TABLET ORAL EVERY 8 HOURS
Refills: 0 | Status: DISCONTINUED | OUTPATIENT
Start: 2023-03-11 | End: 2023-03-12

## 2023-03-10 RX ORDER — PANTOPRAZOLE SODIUM 20 MG/1
40 TABLET, DELAYED RELEASE ORAL
Refills: 0 | Status: DISCONTINUED | OUTPATIENT
Start: 2023-03-10 | End: 2023-03-12

## 2023-03-10 RX ORDER — SODIUM CHLORIDE 9 MG/ML
500 INJECTION INTRAMUSCULAR; INTRAVENOUS; SUBCUTANEOUS ONCE
Refills: 0 | Status: COMPLETED | OUTPATIENT
Start: 2023-03-10 | End: 2023-03-10

## 2023-03-10 RX ORDER — CELECOXIB 200 MG/1
200 CAPSULE ORAL EVERY 12 HOURS
Refills: 0 | Status: DISCONTINUED | OUTPATIENT
Start: 2023-03-11 | End: 2023-03-12

## 2023-03-10 RX ORDER — ASCORBIC ACID 60 MG
1 TABLET,CHEWABLE ORAL
Qty: 0 | Refills: 0 | DISCHARGE

## 2023-03-10 RX ORDER — HYDROMORPHONE HYDROCHLORIDE 2 MG/ML
1 INJECTION INTRAMUSCULAR; INTRAVENOUS; SUBCUTANEOUS
Refills: 0 | Status: DISCONTINUED | OUTPATIENT
Start: 2023-03-10 | End: 2023-03-10

## 2023-03-10 RX ORDER — PREGABALIN 225 MG/1
1 CAPSULE ORAL
Qty: 0 | Refills: 0 | DISCHARGE

## 2023-03-10 RX ORDER — TAMSULOSIN HYDROCHLORIDE 0.4 MG/1
0.4 CAPSULE ORAL
Refills: 0 | Status: DISCONTINUED | OUTPATIENT
Start: 2023-03-10 | End: 2023-03-12

## 2023-03-10 RX ORDER — ACETAMINOPHEN 500 MG
1000 TABLET ORAL ONCE
Refills: 0 | Status: COMPLETED | OUTPATIENT
Start: 2023-03-10 | End: 2023-03-10

## 2023-03-10 RX ORDER — LEVOTHYROXINE SODIUM 125 MCG
75 TABLET ORAL
Qty: 0 | Refills: 0 | DISCHARGE

## 2023-03-10 RX ORDER — OXYCODONE HYDROCHLORIDE 5 MG/1
5 TABLET ORAL EVERY 4 HOURS
Refills: 0 | Status: DISCONTINUED | OUTPATIENT
Start: 2023-03-10 | End: 2023-03-12

## 2023-03-10 RX ORDER — TRAMADOL HYDROCHLORIDE 50 MG/1
50 TABLET ORAL ONCE
Refills: 0 | Status: DISCONTINUED | OUTPATIENT
Start: 2023-03-10 | End: 2023-03-10

## 2023-03-10 RX ORDER — SODIUM CHLORIDE 9 MG/ML
500 INJECTION INTRAMUSCULAR; INTRAVENOUS; SUBCUTANEOUS ONCE
Refills: 0 | Status: COMPLETED | OUTPATIENT
Start: 2023-03-10 | End: 2023-03-11

## 2023-03-10 RX ORDER — METOCLOPRAMIDE HCL 10 MG
10 TABLET ORAL ONCE
Refills: 0 | Status: DISCONTINUED | OUTPATIENT
Start: 2023-03-10 | End: 2023-03-10

## 2023-03-10 RX ORDER — HYDROMORPHONE HYDROCHLORIDE 2 MG/ML
0.5 INJECTION INTRAMUSCULAR; INTRAVENOUS; SUBCUTANEOUS ONCE
Refills: 0 | Status: DISCONTINUED | OUTPATIENT
Start: 2023-03-10 | End: 2023-03-12

## 2023-03-10 RX ORDER — ACETAMINOPHEN 500 MG
1000 TABLET ORAL ONCE
Refills: 0 | Status: COMPLETED | OUTPATIENT
Start: 2023-03-11 | End: 2023-03-11

## 2023-03-10 RX ORDER — TRAMADOL HYDROCHLORIDE 50 MG/1
50 TABLET ORAL EVERY 6 HOURS
Refills: 0 | Status: DISCONTINUED | OUTPATIENT
Start: 2023-03-10 | End: 2023-03-12

## 2023-03-10 RX ORDER — OXYCODONE HYDROCHLORIDE 5 MG/1
5 TABLET ORAL ONCE
Refills: 0 | Status: DISCONTINUED | OUTPATIENT
Start: 2023-03-10 | End: 2023-03-10

## 2023-03-10 RX ORDER — KETOROLAC TROMETHAMINE 30 MG/ML
15 SYRINGE (ML) INJECTION EVERY 6 HOURS
Refills: 0 | Status: DISCONTINUED | OUTPATIENT
Start: 2023-03-10 | End: 2023-03-11

## 2023-03-10 RX ORDER — MULTIVIT-MIN/FERROUS GLUCONATE 9 MG/15 ML
0 LIQUID (ML) ORAL
Qty: 0 | Refills: 0 | DISCHARGE

## 2023-03-10 RX ORDER — CEFAZOLIN SODIUM 1 G
2000 VIAL (EA) INJECTION EVERY 8 HOURS
Refills: 0 | Status: COMPLETED | OUTPATIENT
Start: 2023-03-10 | End: 2023-03-11

## 2023-03-10 RX ORDER — FOLIC ACID 0.8 MG
1 TABLET ORAL DAILY
Refills: 0 | Status: DISCONTINUED | OUTPATIENT
Start: 2023-03-10 | End: 2023-03-12

## 2023-03-10 RX ORDER — ASPIRIN/CALCIUM CARB/MAGNESIUM 324 MG
81 TABLET ORAL
Refills: 0 | Status: DISCONTINUED | OUTPATIENT
Start: 2023-03-10 | End: 2023-03-12

## 2023-03-10 RX ORDER — PANTOPRAZOLE SODIUM 20 MG/1
40 TABLET, DELAYED RELEASE ORAL ONCE
Refills: 0 | Status: COMPLETED | OUTPATIENT
Start: 2023-03-10 | End: 2023-03-10

## 2023-03-10 RX ORDER — FINASTERIDE 5 MG/1
5 TABLET, FILM COATED ORAL DAILY
Refills: 0 | Status: DISCONTINUED | OUTPATIENT
Start: 2023-03-10 | End: 2023-03-12

## 2023-03-10 RX ORDER — CHOLECALCIFEROL (VITAMIN D3) 125 MCG
1 CAPSULE ORAL
Qty: 0 | Refills: 0 | DISCHARGE

## 2023-03-10 RX ORDER — ASCORBIC ACID 60 MG
500 TABLET,CHEWABLE ORAL
Refills: 0 | Status: DISCONTINUED | OUTPATIENT
Start: 2023-03-10 | End: 2023-03-12

## 2023-03-10 RX ORDER — OXYCODONE HYDROCHLORIDE 5 MG/1
10 TABLET ORAL EVERY 4 HOURS
Refills: 0 | Status: DISCONTINUED | OUTPATIENT
Start: 2023-03-10 | End: 2023-03-12

## 2023-03-10 RX ORDER — CEFAZOLIN SODIUM 1 G
2000 VIAL (EA) INJECTION ONCE
Refills: 0 | Status: COMPLETED | OUTPATIENT
Start: 2023-03-10 | End: 2023-03-10

## 2023-03-10 RX ORDER — TAMSULOSIN HYDROCHLORIDE 0.4 MG/1
1 CAPSULE ORAL
Qty: 0 | Refills: 0 | DISCHARGE

## 2023-03-10 RX ORDER — ACETAMINOPHEN 500 MG
975 TABLET ORAL EVERY 8 HOURS
Refills: 0 | Status: DISCONTINUED | OUTPATIENT
Start: 2023-03-10 | End: 2023-03-10

## 2023-03-10 RX ORDER — ONDANSETRON 8 MG/1
4 TABLET, FILM COATED ORAL EVERY 6 HOURS
Refills: 0 | Status: DISCONTINUED | OUTPATIENT
Start: 2023-03-10 | End: 2023-03-12

## 2023-03-10 RX ORDER — MAGNESIUM HYDROXIDE 400 MG/1
30 TABLET, CHEWABLE ORAL DAILY
Refills: 0 | Status: DISCONTINUED | OUTPATIENT
Start: 2023-03-10 | End: 2023-03-12

## 2023-03-10 RX ORDER — HYDROMORPHONE HYDROCHLORIDE 2 MG/ML
0.5 INJECTION INTRAMUSCULAR; INTRAVENOUS; SUBCUTANEOUS
Refills: 0 | Status: DISCONTINUED | OUTPATIENT
Start: 2023-03-10 | End: 2023-03-10

## 2023-03-10 RX ORDER — PREGABALIN 225 MG/1
1000 CAPSULE ORAL AT BEDTIME
Refills: 0 | Status: DISCONTINUED | OUTPATIENT
Start: 2023-03-10 | End: 2023-03-12

## 2023-03-10 RX ORDER — DEXAMETHASONE 0.5 MG/5ML
8 ELIXIR ORAL ONCE
Refills: 0 | Status: COMPLETED | OUTPATIENT
Start: 2023-03-11 | End: 2023-03-11

## 2023-03-10 RX ORDER — POLYETHYLENE GLYCOL 3350 17 G/17G
17 POWDER, FOR SOLUTION ORAL AT BEDTIME
Refills: 0 | Status: DISCONTINUED | OUTPATIENT
Start: 2023-03-10 | End: 2023-03-12

## 2023-03-10 RX ADMIN — Medication 15 MILLIGRAM(S): at 22:14

## 2023-03-10 RX ADMIN — TRAMADOL HYDROCHLORIDE 50 MILLIGRAM(S): 50 TABLET ORAL at 13:50

## 2023-03-10 RX ADMIN — SENNA PLUS 2 TABLET(S): 8.6 TABLET ORAL at 22:13

## 2023-03-10 RX ADMIN — PREGABALIN 1000 MICROGRAM(S): 225 CAPSULE ORAL at 22:13

## 2023-03-10 RX ADMIN — TAMSULOSIN HYDROCHLORIDE 0.4 MILLIGRAM(S): 0.4 CAPSULE ORAL at 22:12

## 2023-03-10 RX ADMIN — SODIUM CHLORIDE 500 MILLILITER(S): 9 INJECTION INTRAMUSCULAR; INTRAVENOUS; SUBCUTANEOUS at 18:20

## 2023-03-10 RX ADMIN — PANTOPRAZOLE SODIUM 40 MILLIGRAM(S): 20 TABLET, DELAYED RELEASE ORAL at 13:51

## 2023-03-10 RX ADMIN — ATORVASTATIN CALCIUM 40 MILLIGRAM(S): 80 TABLET, FILM COATED ORAL at 22:14

## 2023-03-10 RX ADMIN — Medication 15 MILLIGRAM(S): at 22:44

## 2023-03-10 RX ADMIN — TRAMADOL HYDROCHLORIDE 50 MILLIGRAM(S): 50 TABLET ORAL at 14:20

## 2023-03-10 RX ADMIN — Medication 1 TABLET(S): at 22:13

## 2023-03-10 RX ADMIN — POLYETHYLENE GLYCOL 3350 17 GRAM(S): 17 POWDER, FOR SOLUTION ORAL at 22:13

## 2023-03-10 RX ADMIN — Medication 100 MILLIGRAM(S): at 22:16

## 2023-03-10 RX ADMIN — Medication 81 MILLIGRAM(S): at 22:13

## 2023-03-10 RX ADMIN — SODIUM CHLORIDE 500 MILLILITER(S): 9 INJECTION INTRAMUSCULAR; INTRAVENOUS; SUBCUTANEOUS at 22:23

## 2023-03-10 NOTE — PHYSICAL THERAPY INITIAL EVALUATION ADULT - ADDITIONAL COMMENTS
Pt lives in private home with 4 ROSAMARIA w/o rail and 1 step inside. Pt was independent prior to surgery. Pt given cane adjusted to proper height. has a RW

## 2023-03-10 NOTE — CHART NOTE - NSCHARTNOTEFT_GEN_A_CORE
Post-Operative Check    Pt evaluated in RR resting without complaints. Pt states pain is well tolerated. No Chest Pain, SOB, N/V.    Vitals:  T(C): 36.6 (03-10-23 @ 20:00), Max: 36.8 (03-10-23 @ 12:56)  HR: 74 (03-10-23 @ 21:00) (68 - 94)  BP: 122/58 (03-10-23 @ 21:00) (114/55 - 152/88)  RR: 16 (03-10-23 @ 21:00) (16 - 18)  SpO2: 96% (03-10-23 @ 21:00) (90% - 100%    Exam:  Alert and Oriented, No Acute Distress. VSS.   Laterality: L/R Hip/Knee      Prevena vac is clean, dry, intact, and holding suction      (+) PF/DF/EHL/FHL 5/5     Sensation intact to light touch      2+ DP/PT pulse  Calves soft, non-tender bilaterally    Xray: < from: Xray Knee 1 or 2 Views, Right (03.10.23 @ 16:45) >  Impression:  Status-post right total knee arthroplasty.    A/P: 72y Male s/p Right Total Knee Arthroplasty. VSS. NAD  -PT/OT - WBAT/OOB  -IS bedside   -DVT PPx: Aspirin 81mg BID, SCD, Early OOB and Amb  -GI PPx: Protonix 40 mg   -Pain Control  -Continue Current Tx  -Continue post-op abx x 24hrs  -f/u AM labs.   -f/u OR cultures  -Dispo planning: PACU to Floor, pending PT/OT eval.     Nelson Rice PA-C  Orthopedic Surgery Team  Team Pager #[phone]

## 2023-03-10 NOTE — PRE-ANESTHESIA EVALUATION ADULT - NSANTHPMHFT_GEN_ALL_CORE
72yoM w/ PMHx of HTN, HLD, GILSON (on CPAP), hypothyroidism, BPH, GERD, OA. A&Ox4, ambulatory without assistive walking devices at baseline. Brought home CPAP. Takes aspirin 81mg daily, held since 3/1 per PCP's instructions.

## 2023-03-10 NOTE — PHYSICAL THERAPY INITIAL EVALUATION ADULT - PERTINENT HX OF CURRENT PROBLEM, REHAB EVAL
72 yr old male with hx of HTN, Obesity, BPH, Unilateral osteoarthritis Right Knee s/p right total knee in 2016. Now with instability of prosthesis referred for revision

## 2023-03-10 NOTE — BRIEF OPERATIVE NOTE - NSICDXBRIEFPROCEDURE_GEN_ALL_CORE_FT
PROCEDURES:  Revision, total arthroplasty, knee, robot-assisted, using ALLYSON system 17-Feb-2023 16:18:40 synovectomy with poly exchange Karis Asher

## 2023-03-10 NOTE — BRIEF OPERATIVE NOTE - OPERATION/FINDINGS
large synovial scar tissue, located at the proximal pole of patella, with catching preoperative/intraoperatively; and subsequent resolve after removal of excess scar tissue. tracked well. mild medial gapping, increase size of poly with improvement in stability

## 2023-03-10 NOTE — PATIENT PROFILE ADULT - FALL HARM RISK - UNIVERSAL INTERVENTIONS
Bed in lowest position, wheels locked, appropriate side rails in place/Call bell, personal items and telephone in reach/Instruct patient to call for assistance before getting out of bed or chair/Non-slip footwear when patient is out of bed/[place] to call system/Physically safe environment - no spills, clutter or unnecessary equipment/Purposeful Proactive Rounding/Room/bathroom lighting operational, light cord in reach

## 2023-03-10 NOTE — PRE-ANESTHESIA EVALUATION ADULT - NSANTHPEFT_GEN_ALL_CORE
Airway: MP II  Teeth: no loose teeth or dentures  Neck: FROM  CV: RRR  Lungs: CTAB  Regional site: wnl

## 2023-03-11 ENCOUNTER — TRANSCRIPTION ENCOUNTER (OUTPATIENT)
Age: 73
End: 2023-03-11

## 2023-03-11 DIAGNOSIS — I25.10 ATHEROSCLEROTIC HEART DISEASE OF NATIVE CORONARY ARTERY WITHOUT ANGINA PECTORIS: ICD-10-CM

## 2023-03-11 DIAGNOSIS — N40.0 BENIGN PROSTATIC HYPERPLASIA WITHOUT LOWER URINARY TRACT SYMPTOMS: ICD-10-CM

## 2023-03-11 DIAGNOSIS — E03.9 HYPOTHYROIDISM, UNSPECIFIED: ICD-10-CM

## 2023-03-11 DIAGNOSIS — R73.03 PREDIABETES: ICD-10-CM

## 2023-03-11 DIAGNOSIS — G47.30 SLEEP APNEA, UNSPECIFIED: ICD-10-CM

## 2023-03-11 LAB
ANION GAP SERPL CALC-SCNC: 9 MMOL/L — SIGNIFICANT CHANGE UP (ref 5–17)
BUN SERPL-MCNC: 15 MG/DL — SIGNIFICANT CHANGE UP (ref 7–23)
CALCIUM SERPL-MCNC: 9 MG/DL — SIGNIFICANT CHANGE UP (ref 8.4–10.5)
CHLORIDE SERPL-SCNC: 102 MMOL/L — SIGNIFICANT CHANGE UP (ref 96–108)
CO2 SERPL-SCNC: 27 MMOL/L — SIGNIFICANT CHANGE UP (ref 22–31)
CREAT SERPL-MCNC: 0.97 MG/DL — SIGNIFICANT CHANGE UP (ref 0.5–1.3)
EGFR: 83 ML/MIN/1.73M2 — SIGNIFICANT CHANGE UP
GLUCOSE SERPL-MCNC: 157 MG/DL — HIGH (ref 70–99)
GRAM STN FLD: SIGNIFICANT CHANGE UP
GRAM STN FLD: SIGNIFICANT CHANGE UP
HCT VFR BLD CALC: 40.8 % — SIGNIFICANT CHANGE UP (ref 39–50)
HGB BLD-MCNC: 12.8 G/DL — LOW (ref 13–17)
MCHC RBC-ENTMCNC: 26.6 PG — LOW (ref 27–34)
MCHC RBC-ENTMCNC: 31.4 GM/DL — LOW (ref 32–36)
MCV RBC AUTO: 84.8 FL — SIGNIFICANT CHANGE UP (ref 80–100)
NIGHT BLUE STAIN TISS: SIGNIFICANT CHANGE UP
NRBC # BLD: 0 /100 WBCS — SIGNIFICANT CHANGE UP (ref 0–0)
PLATELET # BLD AUTO: 284 K/UL — SIGNIFICANT CHANGE UP (ref 150–400)
POTASSIUM SERPL-MCNC: 4.9 MMOL/L — SIGNIFICANT CHANGE UP (ref 3.5–5.3)
POTASSIUM SERPL-SCNC: 4.9 MMOL/L — SIGNIFICANT CHANGE UP (ref 3.5–5.3)
RBC # BLD: 4.81 M/UL — SIGNIFICANT CHANGE UP (ref 4.2–5.8)
RBC # FLD: 14.6 % — HIGH (ref 10.3–14.5)
SODIUM SERPL-SCNC: 138 MMOL/L — SIGNIFICANT CHANGE UP (ref 135–145)
SPECIMEN SOURCE: SIGNIFICANT CHANGE UP
WBC # BLD: 9.55 K/UL — SIGNIFICANT CHANGE UP (ref 3.8–10.5)
WBC # FLD AUTO: 9.55 K/UL — SIGNIFICANT CHANGE UP (ref 3.8–10.5)

## 2023-03-11 PROCEDURE — 99222 1ST HOSP IP/OBS MODERATE 55: CPT

## 2023-03-11 RX ORDER — TRAMADOL HYDROCHLORIDE 50 MG/1
1 TABLET ORAL
Qty: 0 | Refills: 0 | DISCHARGE
Start: 2023-03-11

## 2023-03-11 RX ORDER — SENNA PLUS 8.6 MG/1
2 TABLET ORAL
Qty: 0 | Refills: 0 | DISCHARGE
Start: 2023-03-11

## 2023-03-11 RX ORDER — POLYETHYLENE GLYCOL 3350 17 G/17G
17 POWDER, FOR SOLUTION ORAL
Qty: 0 | Refills: 0 | DISCHARGE
Start: 2023-03-11

## 2023-03-11 RX ORDER — ASPIRIN/CALCIUM CARB/MAGNESIUM 324 MG
1 TABLET ORAL
Qty: 0 | Refills: 0 | DISCHARGE
Start: 2023-03-11

## 2023-03-11 RX ORDER — OXYCODONE HYDROCHLORIDE 5 MG/1
1 TABLET ORAL
Qty: 0 | Refills: 0 | DISCHARGE
Start: 2023-03-11

## 2023-03-11 RX ORDER — ACETAMINOPHEN 500 MG
3 TABLET ORAL
Qty: 0 | Refills: 0 | DISCHARGE
Start: 2023-03-11

## 2023-03-11 RX ADMIN — Medication 15 MILLIGRAM(S): at 05:46

## 2023-03-11 RX ADMIN — Medication 1 TABLET(S): at 11:16

## 2023-03-11 RX ADMIN — Medication 400 MILLIGRAM(S): at 00:07

## 2023-03-11 RX ADMIN — Medication 500 MILLIGRAM(S): at 05:17

## 2023-03-11 RX ADMIN — SODIUM CHLORIDE 500 MILLILITER(S): 9 INJECTION INTRAMUSCULAR; INTRAVENOUS; SUBCUTANEOUS at 05:18

## 2023-03-11 RX ADMIN — Medication 1 TABLET(S): at 05:16

## 2023-03-11 RX ADMIN — SENNA PLUS 2 TABLET(S): 8.6 TABLET ORAL at 21:39

## 2023-03-11 RX ADMIN — Medication 81 MILLIGRAM(S): at 21:39

## 2023-03-11 RX ADMIN — OXYCODONE HYDROCHLORIDE 5 MILLIGRAM(S): 5 TABLET ORAL at 21:39

## 2023-03-11 RX ADMIN — OXYCODONE HYDROCHLORIDE 10 MILLIGRAM(S): 5 TABLET ORAL at 09:45

## 2023-03-11 RX ADMIN — FINASTERIDE 5 MILLIGRAM(S): 5 TABLET, FILM COATED ORAL at 11:17

## 2023-03-11 RX ADMIN — Medication 15 MILLIGRAM(S): at 11:40

## 2023-03-11 RX ADMIN — PANTOPRAZOLE SODIUM 40 MILLIGRAM(S): 20 TABLET, DELAYED RELEASE ORAL at 05:16

## 2023-03-11 RX ADMIN — POLYETHYLENE GLYCOL 3350 17 GRAM(S): 17 POWDER, FOR SOLUTION ORAL at 21:43

## 2023-03-11 RX ADMIN — Medication 100 MILLIGRAM(S): at 06:30

## 2023-03-11 RX ADMIN — Medication 1 TABLET(S): at 16:17

## 2023-03-11 RX ADMIN — ATORVASTATIN CALCIUM 40 MILLIGRAM(S): 80 TABLET, FILM COATED ORAL at 21:39

## 2023-03-11 RX ADMIN — OXYCODONE HYDROCHLORIDE 5 MILLIGRAM(S): 5 TABLET ORAL at 22:09

## 2023-03-11 RX ADMIN — TAMSULOSIN HYDROCHLORIDE 0.4 MILLIGRAM(S): 0.4 CAPSULE ORAL at 08:51

## 2023-03-11 RX ADMIN — PREGABALIN 1000 MICROGRAM(S): 225 CAPSULE ORAL at 21:39

## 2023-03-11 RX ADMIN — Medication 400 MILLIGRAM(S): at 07:02

## 2023-03-11 RX ADMIN — Medication 101.6 MILLIGRAM(S): at 05:17

## 2023-03-11 RX ADMIN — Medication 15 MILLIGRAM(S): at 16:17

## 2023-03-11 RX ADMIN — Medication 81 MILLIGRAM(S): at 08:50

## 2023-03-11 RX ADMIN — Medication 1000 MILLIGRAM(S): at 00:37

## 2023-03-11 RX ADMIN — Medication 400 MILLIGRAM(S): at 16:17

## 2023-03-11 RX ADMIN — Medication 1 TABLET(S): at 21:38

## 2023-03-11 RX ADMIN — Medication 1 MILLIGRAM(S): at 11:16

## 2023-03-11 RX ADMIN — Medication 500 MILLIGRAM(S): at 16:18

## 2023-03-11 RX ADMIN — Medication 15 MILLIGRAM(S): at 11:17

## 2023-03-11 RX ADMIN — OXYCODONE HYDROCHLORIDE 10 MILLIGRAM(S): 5 TABLET ORAL at 08:51

## 2023-03-11 RX ADMIN — Medication 15 MILLIGRAM(S): at 05:16

## 2023-03-11 RX ADMIN — Medication 75 MICROGRAM(S): at 05:17

## 2023-03-11 RX ADMIN — CELECOXIB 200 MILLIGRAM(S): 200 CAPSULE ORAL at 21:39

## 2023-03-11 RX ADMIN — CELECOXIB 200 MILLIGRAM(S): 200 CAPSULE ORAL at 22:09

## 2023-03-11 RX ADMIN — TAMSULOSIN HYDROCHLORIDE 0.4 MILLIGRAM(S): 0.4 CAPSULE ORAL at 21:39

## 2023-03-11 RX ADMIN — Medication 1000 MILLIGRAM(S): at 07:48

## 2023-03-11 NOTE — OCCUPATIONAL THERAPY INITIAL EVALUATION ADULT - PERSONAL SAFETY AND JUDGMENT, REHAB EVAL
ROUTINE ELECTROENCEPHALOGRAM REPORT      Referring provider: GIRISH Pierre.    DOS: 5/23/2019 (total recording of 27 minutes)    INDICATION:  Beata Gomes 32 y.o. female presenting with seizures.     CURRENT ANTIEPILEPTIC REGIMEN: Valproic Acid 500 mg po bid.     TECHNIQUE: 30 channel routine electroencephalogram (EEG) was performed in accordance with the international 10-20 system. The study was reviewed in bipolar and referential montages. The recording examined the patient during wakeful state.     DESCRIPTION OF THE RECORD:  During the wakefulness, the background showed a symmetrical 10 Hz alpha activity posteriorly with amplitude of 70 mV.  There was reactivity to eye closure/opening.  A normal anterior-posterior gradient was noted with faster beta frequencies seen anteriorly.      ACTIVATION PROCEDURES:   Hyperventilation was performed by the patient for a total of 3 minutes. The technician performing the test noted good effort. This technique failed to produce electroencephalographic changes.      Intermittent Photic stimulation was performed in a stepwise fashion from 1 to 30 Hz and elicited a normal response (photic driving), most noticeable in the posterior leads.      ICTAL AND/OR INTERICTAL FINDINGS:   No focal or generalized epileptiform activity noted. No regional slowing was seen during this routine study.  No clinical events or seizures were reported or recorded during the study.     EKG: sampling of the EKG recording demonstrated sinus rhythm.       INTERPRETATION:  This is a normal routine EEG recording in the awake state.  Clinical correlation is recommended.    Note: A normal EEG does not rule out epilepsy.  If the clinical suspicion remains high for seizures, a prolonged recording to capture clinical or subclinical events may be helpful.        Vamsi Ramírez MD   Epilepsy and Neurodiagnostics.   Clinical  of Neurology [facility]  Medicine.   Diplomate in Neurology, Epilepsy, and Electrodiagnostic Medicine.   Office: [phone]  Fax: [phone]   intact

## 2023-03-11 NOTE — OCCUPATIONAL THERAPY INITIAL EVALUATION ADULT - PERTINENT HX OF CURRENT PROBLEM, REHAB EVAL
72 yr old male with hx of HTN, Obesity, BPH, Unilateral osteoarthritis Right Knee s/p right total knee in 2016. Now with instability of prosthesis referred for revision 72 yr old male with hx of HTN, Obesity, BPH, Unilateral osteoarthritis Right Knee s/p right total knee in 2016. Now with instability of prosthesis referred for revision    3/10 s/p R TKA revision

## 2023-03-11 NOTE — CONSULT NOTE ADULT - PROBLEM SELECTOR RECOMMENDATION 9
s/p revision of right knee arthroplasty. Wound vac on.   -f/u OR cx  -PT eval- home PT/OT  -management per primary ortho

## 2023-03-11 NOTE — DISCHARGE NOTE PROVIDER - HOSPITAL COURSE
History of Present Illness:  72 yr old male with hx of HTN, BPH, Unilateral osteoarthritis Right Knee s/p right total knee in 2016. Now with instability of prosthesis, failed conservative treatments and arthroscopic lateral release with continued patellar clunk syndrome, evaluated by Dr. Schofield referred for revision right total knee replacement.     PAST MEDICAL HISTORY:  BPH (benign prostatic hyperplasia)   GERD (gastroesophageal reflux disease)  Hyperlipidemia   Hypothyroid   Instability of internal right knee prosthesis   Osteoarthritis   Sleep apnea CPAP.     PAST SURGICAL HISTORY:  S/P TKR (total knee replacement), right 2016  Status post left knee replacement 2018.     Hospital Course:  After admission on 3/10/23 and receiving pre-operative parenteral prophylactic antibiotics, the patient underwent an revision right total knee replacement (poly exchange, scar tissue excision).     Patient tolerated the procedure well and was transferred to the recovery room in stable condition, with a stable neuro / vascular exam of the operated extremity.  Prevena incision vac placed onto the incision.     Patient was placed on ecotrin 81mg BID x 4 weeks for DVT ppx, and was placed on Protonix for GI protection.   Patient was made weight bearing as tolerated with the operative leg.     Bandage Instructions:  Prevena wound vac placed to incision. Prevena wound vac to be removed on POD #6 (3/16/23) by home care nursing, and replaced with aquacel dressing (supplied to the patient at discharge).   Aquacel dressing to remain intact, and will be removed by Dr. Schofield or his team at the 1st post operative appointment.     Patient evaluated by PT/OT and recommended for disposition for home with home PT/OT.     Discharge and Orthopedic Care instructions were delineated in the Discharge Plan and reviewed with the patient. All medications were delineated in the medication reconciliation tool and key points were reviewed with the patient. They were deemed stable from an Orthopedic & medical standpoint for discharge.

## 2023-03-11 NOTE — DISCHARGE NOTE PROVIDER - NSDCMRMEDTOKEN_GEN_ALL_CORE_FT
acetaminophen 325 mg oral tablet: 3 tab(s) orally every 8 hours  aspirin 81 mg oral delayed release tablet: 1 tab(s) orally 2 times a day  atorvastatin 40 mg oral tablet: 1 tab(s) orally once a day (at bedtime) MDD:1  Centrum oral tablet: orally 2 times a day  finasteride 5 mg oral tablet: 1 tab(s) orally once a day  L THYROXINE ROCHE: 75 microgram(s) orally once a day  Multiple Vitamins oral tablet: 1 tab(s) orally once a day  naproxen 500 mg oral tablet: 1 tab(s) orally 2 times a day standing x 1 week, then as needed for inflammation  omeprazole 40 mg oral delayed release capsule: 1 cap(s) orally once a day  oxyCODONE 5 mg oral tablet: 1 tab(s) orally every 4 hours, As needed, Moderate Pain (4 - 6)  polyethylene glycol 3350 oral powder for reconstitution: 17 gram(s) orally once a day (at bedtime)  senna leaf extract oral tablet: 2 tab(s) orally once a day (at bedtime)  tamsulosin 0.4 mg oral capsule: 1 cap(s) orally 2 times a day  traMADol 50 mg oral tablet: 1 tab(s) orally every 6 hours, As needed, Mild Pain (1 - 3)  Vitamin B-12 1000 mcg oral tablet: 1 tab(s) orally once a day (at bedtime)  Vitamin C 1000 mg oral tablet: 1 tab(s) orally once a day  Vitamin D3 25 mcg (1000 intl units) oral tablet: 1 tab(s) orally once a day   acetaminophen 325 mg oral tablet: 3 tab(s) orally every 8 hours  atorvastatin 40 mg oral tablet: 1 tab(s) orally once a day (at bedtime) MDD:1  Centrum oral tablet: orally 2 times a day  Colace 100 mg oral capsule: 1 cap(s) orally 2 times a day, As Needed -for constipation   Ecotrin Adult Low Strength 81 mg oral delayed release tablet: 1 tab(s) orally 2 times a day x 4 weeks for blood clot prevention, then return to home dose of 81mg once daily at bedtime  finasteride 5 mg oral tablet: 1 tab(s) orally once a day  L THYROXINE ROCHE: 75 microgram(s) orally once a day  Multiple Vitamins oral tablet: 1 tab(s) orally once a day  naloxone 4 mg/0.1 mL nasal spray: 4 milligram(s) intranasally 1 spray q2-3 min, alternating between nostrils  naproxen 500 mg oral tablet: 1 tab(s) orally 2 times a day standing x 1 week, then as needed for inflammation  omeprazole 40 mg oral delayed release capsule: 1 cap(s) orally once a day  oxyCODONE 5 mg oral tablet: 1 tab(s) orally every 4 hours, As needed, severe pain ( 7 - 10) MDD:006  polyethylene glycol 3350 oral powder for reconstitution: 17 gram(s) orally once a day (at bedtime)  senna leaf extract oral tablet: 2 tab(s) orally once a day (at bedtime), As Needed -for constipation   tamsulosin 0.4 mg oral capsule: 1 cap(s) orally 2 times a day  traMADol 50 mg oral tablet: 1 tab(s) orally every 6 hours, As needed, Moderate Pain (4 - 6) MDD:004  Vitamin B-12 1000 mcg oral tablet: 1 tab(s) orally once a day (at bedtime)  Vitamin C 1000 mg oral tablet: 1 tab(s) orally once a day  Vitamin D3 25 mcg (1000 intl units) oral tablet: 1 tab(s) orally once a day

## 2023-03-11 NOTE — OCCUPATIONAL THERAPY INITIAL EVALUATION ADULT - LIVES WITH, PROFILE
Lives in house with wife, 4 steps to enter with no handrail, 1 step down into living room area, +tub shower and sliding doors with shower chair

## 2023-03-11 NOTE — DISCHARGE NOTE PROVIDER - CARE PROVIDER_API CALL
Kane Schofield)  Orthopaedic Surgery  [address], [address]  [place] [address]  Phone: [phone]  Fax: [phone]  Established Patient  Scheduled Appointment: 03/24/2023   Kane Schofield)  Orthopaedic Surgery  [address] [address], [address]  [place] [address]  Phone: [phone]  Fax: [phone]  Established Patient  Scheduled Appointment: 03/24/2023 02:30 PM

## 2023-03-11 NOTE — DISCHARGE NOTE PROVIDER - NSDCHHNEEDSERVICEOTHER_GEN_ALL_CORE_FT
Bandage Instructions:  Prevena wound vac placed to incision. Prevena wound vac to be removed on POD #6(3/16/23) by home care nursing, and replaced with aquacel dressing (supplied to the patient at discharge).   Aquacel dressing to remain intact, and will be removed by Dr. Schofield or his team at the 1st post operative appointment.  Bandage Instructions:  Prevena wound vac placed to incision. Prevena wound vac to be removed on POD #6(3/16/23) by home care nursing, and replaced with Aquacel dressing (supplied to the patient at discharge).   Aquacel dressing to remain intact, and will be removed by Dr. Schofield or his team at the 1st post operative appointment.

## 2023-03-11 NOTE — DISCHARGE NOTE PROVIDER - NSDCFUADDINST_GEN_ALL_CORE_FT
Please follow up with Dr. Schofield at your pre-scheduled post-operative follow up appointment. (Call to confirm)  Bandage Instructions:  Prevena wound vac placed to incision. Keep clean dry and intact. Do not get wet.   Prevena wound vac to be removed on POD #6(3/16/23) by home care nursing, and replaced with aquacel dressing (supplied to the patient at discharge).   Aquacel dressing to remain intact, and will be removed by Dr. Schofield or his team at the 1st post operative appointment.   Continue to ambulate as tolerated to the operative leg.     Pain medications:   Standing:         -Acetaminophen 500mg - 2 tabs every 8 hours        -Naproxen 500mg - 1 tab every 12 hours  As needed:        -Tramadol 50mg - 1 tab every 8 hours - Take only if needed for MODERATE pain       -oxycodone 5mg - 1 tab every 4-6 hours - Take only if needed for SEVERE or BREAKTHROUGH pain    Other Medications: (Standing)  -Aspirin (Enteric Coated) 81mg every 12 hours - to prevent blood clots (for 4 weeks post operatively, then may return to normal home dosing of Aspirin (ecotrin) 81mg daily)  -Protonix 40mg - 1 tab every 24 hours - to prevent stomach irritation/ulcers  -Senna 8.6mg - 2 pills every 24 hours - stool softener  -colace 100mg - 1 pill every 8 hours - stool softener.     Recommended to follow up with your primary care provider within 1-2 months of hospital discharge to discuss your recent surgery and any change to your medications.  Please follow up with Dr. Schofield at your pre-scheduled post-operative follow up appointment. (Call to confirm)  Bandage Instructions:  Prevena wound vac placed to incision. Keep clean dry and intact. Do not get wet.   Prevena wound vac to be removed on POD #6(3/16/23) by home care nursing, and replaced with aquacel dressing (supplied to the patient at discharge).   Aquacel dressing to remain intact, and will be removed by Dr. Schofield or his team at the 1st post operative appointment.   Continue to ambulate as tolerated to the operative leg.     Pain medications:   Standing:         -Acetaminophen 500mg - 2 tabs every 8 hours        -Naproxen 500mg - 1 tab every 12 hours  As needed:        -Tramadol 50mg - 1 tab every 6-8 hours - Take only if needed for MODERATE pain       -oxycodone 5mg - 1 tab every 4-6 hours - Take only if needed for SEVERE or BREAKTHROUGH pain    Other Medications: (Standing)  -Aspirin (Enteric Coated) 81mg every 12 hours - to prevent blood clots (for 4 weeks post operatively, then may return to normal home dosing of Aspirin (ecotrin) 81mg daily)  -Protonix 40mg - 1 tab every 24 hours - to prevent stomach irritation/ulcers  -Senna 8.6mg - 2 pills every 24 hours - stool softener  -colace 100mg - 1 pill every 8 hours - stool softener.     Recommended to follow up with your primary care provider within 1-2 months of hospital discharge to discuss your recent surgery and any change to your medications.

## 2023-03-11 NOTE — CONSULT NOTE ADULT - SUBJECTIVE AND OBJECTIVE BOX
GLENNY GARCIAS  72y  Male      Patient is a 72y old  Male who presents with a chief complaint of Revision right total knee replacement (11 Mar 2023 06:44)    Subjective: 71yoM with CAD, CAD, GILSON, HLD and pre-diabetes who is here for elective knee surgery s/p revision right knee arthroplasty POD1. Patient seen and examined at bedside. He reports mild pain at the RLE surgical site (wound vac) with swelling. He worked with PT and is tolerating his diet. Denies chest pain, sob, weakness, fevers, chills, ab pain, N/V, or rashes.       PAST MEDICAL/SURGICAL HISTORY  PAST MEDICAL & SURGICAL HISTORY:  Hypothyroid      Hyperlipidemia      BPH (benign prostatic hyperplasia)      Sleep apnea  CPAP      Class 1 obesity with body mass index (BMI) of 32.0 to 32.9 in adult      Osteoarthritis      GERD (gastroesophageal reflux disease)      Instability of internal right knee prosthesis      Status post left knee replacement  2018      S/P TKR (total knee replacement), right  2016          REVIEW OF SYSTEMS:  -see HPI    T(C): 36.6 (03-11-23 @ 12:30), Max: 36.8 (03-10-23 @ 13:41)  HR: 68 (03-11-23 @ 12:30) (55 - 94)  BP: 122/71 (03-11-23 @ 12:30) (109/58 - 152/88)  RR: 18 (03-11-23 @ 12:30) (16 - 18)  SpO2: 94% (03-11-23 @ 12:30) (90% - 100%)  Wt(kg): --Vital Signs Last 24 Hrs  T(C): 36.6 (11 Mar 2023 12:30), Max: 36.8 (10 Mar 2023 13:41)  T(F): 97.9 (11 Mar 2023 12:30), Max: 98.2 (11 Mar 2023 08:16)  HR: 68 (11 Mar 2023 12:30) (55 - 94)  BP: 122/71 (11 Mar 2023 12:30) (109/58 - 152/88)  BP(mean): 80 (10 Mar 2023 21:30) (79 - 95)  RR: 18 (11 Mar 2023 12:30) (16 - 18)  SpO2: 94% (11 Mar 2023 12:30) (90% - 100%)    Parameters below as of 11 Mar 2023 12:30  Patient On (Oxygen Delivery Method): room air        PHYSICAL EXAM:  GENERAL: NAD, well-groomed, well-developed  HEAD:  Atraumatic, Normocephalic  EYES: EOMI, conjunctiva and sclera clear  ENMT:  Moist mucous membranes, no white plaque  NECK: Supple, No JVD  NERVOUS SYSTEM:  Alert & Oriented X3, Good concentration; Motor Strength 5/5 B/L upper and lower extremities;   CHEST/LUNG: Clear bilaterally; No rales, rhonchi, wheezing, or rubs  HEART: Regular rate and rhythm; No murmurs, rubs, or gallops  ABDOMEN: Soft, Nontender, Nondistended; Bowel sounds present  EXTREMITIES:  2+ Peripheral Pulses, 1+ edema b/l LE. Right knee surgical wound with vac in place, no leakage  LYMPH: No lymphadenopathy noted  SKIN: No rashes or lesions    Consultant(s) Notes Reviewed:  [x ] YES  [ ] NO  Care Discussed with Consultants/Other Providers [ x] YES  [ ] NO    LABS:  CBC   03-11-23 @ 06:06  Hematcorit 40.8  Hemoglobin 12.8  Mean Cell Hemoglobin 26.6  Platelet Count-Automated 284  RBC Count 4.81  Red Cell Distrib Width 14.6  Wbc Count 9.55      BMP  03-11-23 @ 06:06  Anion Gap. Serum 9  Blood Urea Nitrogen,Serm 15  Calcium, Total Serum 9.0  Carbon Dioxide, Serum 27  Chloride, Serum 102  Creatinine, Serum 0.97  eGFR in  --  eGFR in Non Afican American --  Gloucose, serum 157  Potassium, Serum 4.9  Sodium, Serum 138                  CMP  03-11-23 @ 06:06  Avelina Aminotransferase(ALT/SGPT)--  Albumin, Serum --  Alkaline Phosphatase, Serum --  Anion Gap, Serum 9  Aspartate Aminotransferase (AST/SGOT)--  Bilirubin Total, Serum --  Blood Urea Nitrogen, Serum 15  Calcium,Total Serum 9.0  Carbon Dioxide, Serum 27  Chloride, Serum 102  Creatinine, Serum 0.97  eGFR if  --  eGFR if Non African American --  Glucose, Serum 157  Potassium, Serum 4.9  Protein Total, Serum --  Sodium, Serum 138                          PT/INR      Amylase/Lipase            RADIOLOGY & ADDITIONAL TESTS:  < from: Xray Knee 1 or 2 Views, Right (03.10.23 @ 16:45) >  Findings:    The patient is status-post right total knee replacement with patellar   resurfacing and cemented components in the usual position. The hardware   is intact. There is no evidence of anna-prosthetic fracture.   Post-operative changes include skin staples, soft tissue swelling and   subcutaneous emphysema.    Impression:    Status-post right total knee arthroplasty.    --- End of Report ---    < end of copied text >    Imaging Personally Reviewed:  [ ] YES  [ ] NO
Cardiology    Mr Ugarte is a very pleasant 72yoM here for elective knee surgery.  He was pre-opped by Dr Elizondo, and is now doing well post op with no angina, palpitations, orthopnea or shortness of breath.  He is up and walking with a rollator with PT supervision.  A 10 pt ROS is otherwise negative.    Prior office note:  I had the pleasure of seeing your patient, Mr. Main Ugarte, in the office today for cardiac followup. As you know, he is a very  pleasant 71-year-old male with past medical history of hyperlipidemia, BPH, hypothyroidism, prediabetes, obstructive sleep apnea  with nonobstructive coronary artery disease, a former smoker who comes to the office for a cardiology evaluation.  The patient states that he may be in need of preoperative assessment from the cardiovascular perspective prior to elective right knee  surgery in March 2023. Otherwise, the patient states that he feels well without symptoms of dyspnea on exertion.  The patient denies chest pain, pleuritic chest pain, exertional chest pain, or syncope.    PMHX: GILSON; Prediabetes; Hypertension; BPH; Nonobstructive coronary artery disease.; Hypothyroidism    PSHX: Cardiac cath on 9/2021: 40% stenosis in RCA and distal circumflex, mild atherosclerosis in in LAD; Status post right knee  arthroscopic surgery.; s/p right knee surgery    Social HX: Alcohol - SOCIAL DRINKER - former, quits in 1995; Drugs - NO HISTORY OF DRUG ABUSE; Smoking -  FORMER SMOKER - smoked 5-6 cig per day for 5 years, quits 25 yoa  Fam HX: Father() - MI at age of 70 - Healthy    Allergies: No Known Drug Allergies  Medications: aspirin 81 mg tablet,delayed release 1 TABLET DAILY  atorvastatin 40 mg tablet 1 TABLET QHS  finasteride 5 mg tablet 1 TABLET DAILY  levothyroxine 50 mcg tablet 1 TABLET DAILY  omeprazole 40 mg capsule,delayed release 1 CAPSULE DAILY  tamsulosin 0.4 mg capsule 1 CAPSULE DAILY    acetaminophen     Tablet .. 975 milliGRAM(s) Oral every 8 hours  acetaminophen   IVPB .. 1000 milliGRAM(s) IV Intermittent once  aluminum hydroxide/magnesium hydroxide/simethicone Suspension 30 milliLiter(s) Oral four times a day PRN  ascorbic acid 500 milliGRAM(s) Oral two times a day  aspirin enteric coated 81 milliGRAM(s) Oral two times a day  atorvastatin 40 milliGRAM(s) Oral at bedtime  calcium carbonate 1250 mG  + Vitamin D (OsCal 500 + D) 1 Tablet(s) Oral three times a day  celecoxib 200 milliGRAM(s) Oral every 12 hours  cyanocobalamin 1000 MICROGram(s) Oral at bedtime  finasteride 5 milliGRAM(s) Oral daily  folic acid 1 milliGRAM(s) Oral daily  HYDROmorphone  Injectable 0.5 milliGRAM(s) IV Push once  ketorolac   Injectable 15 milliGRAM(s) IV Push every 6 hours  levothyroxine 75 MICROGram(s) Oral daily  magnesium hydroxide Suspension 30 milliLiter(s) Oral daily PRN  multivitamin 1 Tablet(s) Oral daily  ondansetron Injectable 4 milliGRAM(s) IV Push every 6 hours PRN  oxyCODONE    IR 5 milliGRAM(s) Oral every 4 hours PRN  oxyCODONE    IR 10 milliGRAM(s) Oral every 4 hours PRN  pantoprazole    Tablet 40 milliGRAM(s) Oral before breakfast  polyethylene glycol 3350 17 Gram(s) Oral at bedtime  senna 2 Tablet(s) Oral at bedtime  tamsulosin 0.4 milliGRAM(s) Oral two times a day  traMADol 50 milliGRAM(s) Oral every 6 hours PRN                            12.8   9.55  )-----------( 284      ( 11 Mar 2023 06:06 )             40.8       03-11    138  |  102  |  15  ----------------------------<  157<H>  4.9   |  27  |  0.97    Ca    9.0      11 Mar 2023 06:06      T(C): 36.8 (03-11-23 @ 08:16), Max: 36.8 (03-10-23 @ 12:56)  HR: 77 (03-11-23 @ 10:01) (55 - 94)  BP: 139/78 (03-11-23 @ 08:42) (109/58 - 152/88)  RR: 18 (03-11-23 @ 08:16) (16 - 18)  SpO2: 95% (03-11-23 @ 10:01) (90% - 100%)  Wt(kg): --    I&O's Summary    10 Mar 2023 07:01  -  11 Mar 2023 07:00  --------------------------------------------------------  IN: 1980 mL / OUT: 750 mL / NET: 1230 mL    General: Well nourished, no acute distress, alert and oriented x 3  Head: normocephalic, no trauma  Neck: no JVD, no bruit, supple, not enlarged  CV: S1S2, no S3, regular rate, rhythm is SINUS, no murmurs.    Lungs: clear BL, no rales or wheezes  Abdomen: bowel sounds +, soft, nontender, nondistended  Extremities: no clubbing, cyanosis or edema  Neuro: Moves all 4 extremities, sensation intact x 4 extremities  Skin: warm and moist, normal turgor  Psych: Mood and affect are appropriate for circumstances  MSK: normal range of motion and strength x4 extremities.    Pre Op Echo 2/2023 with normal biventricular size and function.  Stress test 11/2022 with normal LV EF and perfusion.    Assessment and Plan:  Mr Ugarte is a very pleasant 71yoM with CAD, GILSON, HLD and pre-diabetes who is here for elective knee surgery.  His pre-op testing was uneventful, and he is now seen recovering well from surgery.    Prior to discharge, be sure to continue aspirin 81mg daily, atorvastatin for CAD prevention.  Caution with use of celebrex, aspirin, and ketorolac re: GI bleed risk.  Will follow with you.  Will need followup scheduled w/ Dr Elizondo in 2-3months post op.    Lewis Haq M.D.  Cardiac Electrophysiology  [phone]

## 2023-03-11 NOTE — DISCHARGE NOTE PROVIDER - NSDCCPCAREPLAN_GEN_ALL_CORE_FT
PRINCIPAL DISCHARGE DIAGNOSIS  Diagnosis: Instability of internal right knee prosthesis, subsequent encounter  Assessment and Plan of Treatment:

## 2023-03-11 NOTE — PROGRESS NOTE ADULT - ASSESSMENT
A/P:  Pt is a 72 yr old male s/p revision right Knee Arthroplasty, POD #1    - Pain control/ Analgesia  - DVT ppx ASA 81 BID, foot pumps  - PT/OT - wbat/oob    - Incentive Spirometer  - GI prophylaxis: Protonix  - FU OR Cx  - notify Ortho for questions   - Dispo planning to home with home PT.   - Will discuss with attending, Dr. Schofield, and update with any changes to the above plan    Nelida Mesa PA-C  Orthopedic Surgery  Team Pager #[phone] A/P:  Pt is a 72 yr old male s/p revision right Knee Arthroplasty, POD #1    - Pain control/ Analgesia - avoid narcotics.   - DVT ppx ASA 81 BID, foot pumps  - PT/OT - wbat/oob    - Incentive Spirometer  - GI prophylaxis: Protonix  - FU OR Cx  - notify Ortho for questions   - Dispo planning to home with home PT.   - Will discuss with attending, Dr. Schofield, and update with any changes to the above plan    Nelida Mesa PA-C  Orthopedic Surgery  Team Pager #[phone]

## 2023-03-11 NOTE — DISCHARGE NOTE PROVIDER - PROVIDER TOKENS
PROVIDER:[TOKEN:[[id number]],SCHEDULEDAPPT:[03/24/2023],ESTABLISHEDPATIENT:[T]] PROVIDER:[TOKEN:[[id number]],SCHEDULEDAPPT:[03/24/2023],SCHEDULEDAPPTTIME:[02:30 PM],ESTABLISHEDPATIENT:[T]]

## 2023-03-11 NOTE — DISCHARGE NOTE PROVIDER - NSDCFUSCHEDAPPT_GEN_ALL_CORE_FT
[facility] Physician [facility]  ORTHOSURG [address]  Scheduled Appointment: 03/24/2023

## 2023-03-11 NOTE — DISCHARGE NOTE PROVIDER - NSDCCPTREATMENT_GEN_ALL_CORE_FT
PRINCIPAL PROCEDURE  Procedure: Revision, total arthroplasty, knee, robot-assisted, using ALLYSON system  Findings and Treatment: synovectomy with poly exchange

## 2023-03-11 NOTE — CONSULT NOTE ADULT - ASSESSMENT
Mr. Ugarte is a 73yo man with pmhx of obesity, karl, CAD, prediabetes, right osteoarithtis s/p right total knee replacement 2016, admitted for elective revision. s/p revision of R knee arthroplasty POD1 with pain controlled.

## 2023-03-11 NOTE — PROGRESS NOTE ADULT - SUBJECTIVE AND OBJECTIVE BOX
***INCOMPLETE NOTE*****  ORTHO PROGRESS NOTE     Patient is status post revision right total knee replacement, POD #1  Pt seen and examined at bedside, denies SOB, CP, Dizziness. N/V/D/HA.    No significant overnight events. Pain well controlled. Patient ambulated with PT, recommended for dispo to home PT pending clearance    Vital Signs Last 24 Hrs  T(C): 36.3 (11 Mar 2023 04:47), Max: 36.8 (10 Mar 2023 12:56)  T(F): 97.3 (11 Mar 2023 04:47), Max: 98.2 (10 Mar 2023 12:56)  HR: 59 (11 Mar 2023 04:47) (55 - 94)  BP: 115/62 (11 Mar 2023 04:47) (109/58 - 152/88)  BP(mean): 80 (10 Mar 2023 21:30) (79 - 95)  RR: 18 (11 Mar 2023 04:47) (16 - 18)  SpO2: 97% (11 Mar 2023 04:47) (90% - 100%)    Parameters below as of 11 Mar 2023 04:47  Patient On (Oxygen Delivery Method): nasal cannula  O2 Flow (L/min): 2      Exam:   Gen: No apparent distress  RLE: Prevena incisional vac to the right knee, intact and holding suction  BLE: motor intact EHL/FHL/TA/GS .  Sensation is grossly intact to light touch in the distal extremities.    Compartments are soft bilaterally.  Extremities are warm .  DP 2+ BLE     Labs:  CBC Full  -  ( 11 Mar 2023 06:06 )  WBC Count : 9.55 K/uL  RBC Count : 4.81 M/uL  Hemoglobin : 12.8 g/dL  Hematocrit : 40.8 %  Platelet Count - Automated : 284 K/uL  Mean Cell Volume : 84.8 fl  Mean Cell Hemoglobin : 26.6 pg  Mean Cell Hemoglobin Concentration : 31.4 gm/dL        03-11    138  |  102  |  15  ----------------------------<  157<H>  4.9   |  27  |  0.97    Ca    9.0      11 Mar 2023 06:06        ORTHO PROGRESS NOTE     Patient is status post revision right total knee replacement, POD #1  Pt seen and examined at bedside, denies SOB, CP, Dizziness. N/V/D/HA.    No significant overnight events. Pain well controlled. Patient ambulated with PT, recommended for dispo to home PT pending clearance    Vital Signs Last 24 Hrs  T(C): 36.3 (11 Mar 2023 04:47), Max: 36.8 (10 Mar 2023 12:56)  T(F): 97.3 (11 Mar 2023 04:47), Max: 98.2 (10 Mar 2023 12:56)  HR: 59 (11 Mar 2023 04:47) (55 - 94)  BP: 115/62 (11 Mar 2023 04:47) (109/58 - 152/88)  BP(mean): 80 (10 Mar 2023 21:30) (79 - 95)  RR: 18 (11 Mar 2023 04:47) (16 - 18)  SpO2: 97% (11 Mar 2023 04:47) (90% - 100%)    Parameters below as of 11 Mar 2023 04:47  Patient On (Oxygen Delivery Method): nasal cannula  O2 Flow (L/min): 2      Exam:   Gen: No apparent distress  RLE: Prevena incisional vac to the right knee, intact and holding suction  BLE: motor intact EHL/FHL/TA/GS .  Sensation is grossly intact to light touch in the distal extremities.    Compartments are soft bilaterally.  Extremities are warm .  DP 2+ BLE     Labs:  CBC Full  -  ( 11 Mar 2023 06:06 )  WBC Count : 9.55 K/uL  RBC Count : 4.81 M/uL  Hemoglobin : 12.8 g/dL  Hematocrit : 40.8 %  Platelet Count - Automated : 284 K/uL  Mean Cell Volume : 84.8 fl  Mean Cell Hemoglobin : 26.6 pg  Mean Cell Hemoglobin Concentration : 31.4 gm/dL        03-11    138  |  102  |  15  ----------------------------<  157<H>  4.9   |  27  |  0.97    Ca    9.0      11 Mar 2023 06:06

## 2023-03-12 ENCOUNTER — TRANSCRIPTION ENCOUNTER (OUTPATIENT)
Age: 73
End: 2023-03-12

## 2023-03-12 VITALS
DIASTOLIC BLOOD PRESSURE: 75 MMHG | HEART RATE: 67 BPM | RESPIRATION RATE: 18 BRPM | TEMPERATURE: 97 F | SYSTOLIC BLOOD PRESSURE: 134 MMHG | OXYGEN SATURATION: 96 %

## 2023-03-12 DIAGNOSIS — Z29.9 ENCOUNTER FOR PROPHYLACTIC MEASURES, UNSPECIFIED: ICD-10-CM

## 2023-03-12 LAB
ANION GAP SERPL CALC-SCNC: 13 MMOL/L — SIGNIFICANT CHANGE UP (ref 5–17)
BUN SERPL-MCNC: 24 MG/DL — HIGH (ref 7–23)
CALCIUM SERPL-MCNC: 8.9 MG/DL — SIGNIFICANT CHANGE UP (ref 8.4–10.5)
CHLORIDE SERPL-SCNC: 98 MMOL/L — SIGNIFICANT CHANGE UP (ref 96–108)
CO2 SERPL-SCNC: 22 MMOL/L — SIGNIFICANT CHANGE UP (ref 22–31)
CREAT SERPL-MCNC: 1.29 MG/DL — SIGNIFICANT CHANGE UP (ref 0.5–1.3)
EGFR: 59 ML/MIN/1.73M2 — LOW
GLUCOSE SERPL-MCNC: 127 MG/DL — HIGH (ref 70–99)
HCT VFR BLD CALC: 37.2 % — LOW (ref 39–50)
HGB BLD-MCNC: 11.7 G/DL — LOW (ref 13–17)
MCHC RBC-ENTMCNC: 26.4 PG — LOW (ref 27–34)
MCHC RBC-ENTMCNC: 31.5 GM/DL — LOW (ref 32–36)
MCV RBC AUTO: 83.8 FL — SIGNIFICANT CHANGE UP (ref 80–100)
NRBC # BLD: 0 /100 WBCS — SIGNIFICANT CHANGE UP (ref 0–0)
PLATELET # BLD AUTO: 270 K/UL — SIGNIFICANT CHANGE UP (ref 150–400)
POTASSIUM SERPL-MCNC: 4.2 MMOL/L — SIGNIFICANT CHANGE UP (ref 3.5–5.3)
POTASSIUM SERPL-SCNC: 4.2 MMOL/L — SIGNIFICANT CHANGE UP (ref 3.5–5.3)
RBC # BLD: 4.44 M/UL — SIGNIFICANT CHANGE UP (ref 4.2–5.8)
RBC # FLD: 14.7 % — HIGH (ref 10.3–14.5)
SODIUM SERPL-SCNC: 133 MMOL/L — LOW (ref 135–145)
WBC # BLD: 11.37 K/UL — HIGH (ref 3.8–10.5)
WBC # FLD AUTO: 11.37 K/UL — HIGH (ref 3.8–10.5)

## 2023-03-12 PROCEDURE — 82962 GLUCOSE BLOOD TEST: CPT

## 2023-03-12 PROCEDURE — 86900 BLOOD TYPING SEROLOGIC ABO: CPT

## 2023-03-12 PROCEDURE — 87116 MYCOBACTERIA CULTURE: CPT

## 2023-03-12 PROCEDURE — 87015 SPECIMEN INFECT AGNT CONCNTJ: CPT

## 2023-03-12 PROCEDURE — 86901 BLOOD TYPING SEROLOGIC RH(D): CPT

## 2023-03-12 PROCEDURE — U0003: CPT

## 2023-03-12 PROCEDURE — U0005: CPT

## 2023-03-12 PROCEDURE — 87205 SMEAR GRAM STAIN: CPT

## 2023-03-12 PROCEDURE — 86850 RBC ANTIBODY SCREEN: CPT

## 2023-03-12 PROCEDURE — 87075 CULTR BACTERIA EXCEPT BLOOD: CPT

## 2023-03-12 PROCEDURE — 87070 CULTURE OTHR SPECIMN AEROBIC: CPT

## 2023-03-12 PROCEDURE — C1713: CPT

## 2023-03-12 PROCEDURE — 87102 FUNGUS ISOLATION CULTURE: CPT

## 2023-03-12 PROCEDURE — 97530 THERAPEUTIC ACTIVITIES: CPT

## 2023-03-12 PROCEDURE — 80048 BASIC METABOLIC PNL TOTAL CA: CPT

## 2023-03-12 PROCEDURE — 36415 COLL VENOUS BLD VENIPUNCTURE: CPT

## 2023-03-12 PROCEDURE — 97161 PT EVAL LOW COMPLEX 20 MIN: CPT

## 2023-03-12 PROCEDURE — 97116 GAIT TRAINING THERAPY: CPT

## 2023-03-12 PROCEDURE — 99232 SBSQ HOSP IP/OBS MODERATE 35: CPT

## 2023-03-12 PROCEDURE — C1776: CPT

## 2023-03-12 PROCEDURE — 87206 SMEAR FLUORESCENT/ACID STAI: CPT

## 2023-03-12 PROCEDURE — 97165 OT EVAL LOW COMPLEX 30 MIN: CPT

## 2023-03-12 PROCEDURE — S2900: CPT

## 2023-03-12 PROCEDURE — 85027 COMPLETE CBC AUTOMATED: CPT

## 2023-03-12 PROCEDURE — C9803: CPT

## 2023-03-12 PROCEDURE — 73560 X-RAY EXAM OF KNEE 1 OR 2: CPT

## 2023-03-12 RX ORDER — OMEPRAZOLE 10 MG/1
1 CAPSULE, DELAYED RELEASE ORAL
Qty: 0 | Refills: 0 | DISCHARGE

## 2023-03-12 RX ORDER — ACETAMINOPHEN 500 MG
3 TABLET ORAL
Qty: 126 | Refills: 0
Start: 2023-03-12 | End: 2023-03-25

## 2023-03-12 RX ORDER — SENNA PLUS 8.6 MG/1
2 TABLET ORAL
Qty: 14 | Refills: 0
Start: 2023-03-12 | End: 2023-03-18

## 2023-03-12 RX ORDER — OMEPRAZOLE 10 MG/1
1 CAPSULE, DELAYED RELEASE ORAL
Qty: 30 | Refills: 0
Start: 2023-03-12 | End: 2023-04-10

## 2023-03-12 RX ORDER — TRAMADOL HYDROCHLORIDE 50 MG/1
1 TABLET ORAL
Qty: 28 | Refills: 0
Start: 2023-03-12 | End: 2023-03-18

## 2023-03-12 RX ORDER — DOCUSATE SODIUM 100 MG
1 CAPSULE ORAL
Qty: 14 | Refills: 0
Start: 2023-03-12 | End: 2023-03-18

## 2023-03-12 RX ORDER — ASPIRIN/CALCIUM CARB/MAGNESIUM 324 MG
1 TABLET ORAL
Qty: 60 | Refills: 0
Start: 2023-03-12 | End: 2023-04-10

## 2023-03-12 RX ORDER — OXYCODONE HYDROCHLORIDE 5 MG/1
1 TABLET ORAL
Qty: 42 | Refills: 0
Start: 2023-03-12 | End: 2023-03-18

## 2023-03-12 RX ORDER — NALOXONE HYDROCHLORIDE 4 MG/.1ML
4 SPRAY NASAL
Qty: 1 | Refills: 0
Start: 2023-03-12

## 2023-03-12 RX ADMIN — Medication 975 MILLIGRAM(S): at 08:40

## 2023-03-12 RX ADMIN — TAMSULOSIN HYDROCHLORIDE 0.4 MILLIGRAM(S): 0.4 CAPSULE ORAL at 08:40

## 2023-03-12 RX ADMIN — Medication 1 MILLIGRAM(S): at 11:03

## 2023-03-12 RX ADMIN — Medication 975 MILLIGRAM(S): at 09:30

## 2023-03-12 RX ADMIN — Medication 81 MILLIGRAM(S): at 08:40

## 2023-03-12 RX ADMIN — Medication 1 TABLET(S): at 13:42

## 2023-03-12 RX ADMIN — FINASTERIDE 5 MILLIGRAM(S): 5 TABLET, FILM COATED ORAL at 11:04

## 2023-03-12 RX ADMIN — Medication 1 TABLET(S): at 11:04

## 2023-03-12 RX ADMIN — Medication 75 MICROGRAM(S): at 05:13

## 2023-03-12 RX ADMIN — CELECOXIB 200 MILLIGRAM(S): 200 CAPSULE ORAL at 11:51

## 2023-03-12 RX ADMIN — Medication 500 MILLIGRAM(S): at 05:14

## 2023-03-12 RX ADMIN — CELECOXIB 200 MILLIGRAM(S): 200 CAPSULE ORAL at 11:04

## 2023-03-12 RX ADMIN — PANTOPRAZOLE SODIUM 40 MILLIGRAM(S): 20 TABLET, DELAYED RELEASE ORAL at 05:14

## 2023-03-12 RX ADMIN — Medication 1 TABLET(S): at 05:13

## 2023-03-12 NOTE — PROGRESS NOTE ADULT - SUBJECTIVE AND OBJECTIVE BOX
Patient is a 72y old  Male who presents with a chief complaint of Revision right total knee replacement (12 Mar 2023 06:26)        SUBJECTIVE / OVERNIGHT EVENTS: Patient had no acute events overnight. Patient seen and examined at bedside this morning. Icing RLE    ROS: [ - ] Fever [ - ] Chills [ - ] Nausea/Vomiting [ - ] Chest Pain [ - ] Shortness of breath     MEDICATIONS  (STANDING):  acetaminophen     Tablet .. 975 milliGRAM(s) Oral every 8 hours  ascorbic acid 500 milliGRAM(s) Oral two times a day  aspirin enteric coated 81 milliGRAM(s) Oral two times a day  atorvastatin 40 milliGRAM(s) Oral at bedtime  calcium carbonate 1250 mG  + Vitamin D (OsCal 500 + D) 1 Tablet(s) Oral three times a day  celecoxib 200 milliGRAM(s) Oral every 12 hours  cyanocobalamin 1000 MICROGram(s) Oral at bedtime  finasteride 5 milliGRAM(s) Oral daily  folic acid 1 milliGRAM(s) Oral daily  HYDROmorphone  Injectable 0.5 milliGRAM(s) IV Push once  levothyroxine 75 MICROGram(s) Oral daily  multivitamin 1 Tablet(s) Oral daily  pantoprazole    Tablet 40 milliGRAM(s) Oral before breakfast  polyethylene glycol 3350 17 Gram(s) Oral at bedtime  senna 2 Tablet(s) Oral at bedtime  tamsulosin 0.4 milliGRAM(s) Oral two times a day    MEDICATIONS  (PRN):  aluminum hydroxide/magnesium hydroxide/simethicone Suspension 30 milliLiter(s) Oral four times a day PRN Indigestion  bisacodyl Suppository 10 milliGRAM(s) Rectal once PRN Constipation  magnesium hydroxide Suspension 30 milliLiter(s) Oral daily PRN Constipation  ondansetron Injectable 4 milliGRAM(s) IV Push every 6 hours PRN Nausea and/or Vomiting  oxyCODONE    IR 5 milliGRAM(s) Oral every 4 hours PRN Moderate Pain (4 - 6)  oxyCODONE    IR 10 milliGRAM(s) Oral every 4 hours PRN Severe Pain (7 - 10)  traMADol 50 milliGRAM(s) Oral every 6 hours PRN Mild Pain (1 - 3)      Vital Signs Last 24 Hrs  T(C): 36.3 (12 Mar 2023 12:00), Max: 36.7 (12 Mar 2023 04:52)  T(F): 97.4 (12 Mar 2023 12:00), Max: 98.1 (12 Mar 2023 04:52)  HR: 67 (12 Mar 2023 12:00) (59 - 74)  BP: 134/75 (12 Mar 2023 12:00) (114/65 - 138/73)  BP(mean): --  RR: 18 (12 Mar 2023 12:00) (18 - 18)  SpO2: 96% (12 Mar 2023 12:00) (96% - 99%)    Parameters below as of 12 Mar 2023 12:00  Patient On (Oxygen Delivery Method): room air      CAPILLARY BLOOD GLUCOSE        I&O's Summary    11 Mar 2023 06:01  -  12 Mar 2023 07:00  --------------------------------------------------------  IN: 500 mL / OUT: 600 mL / NET: -100 mL        PHYSICAL EXAM  GENERAL: NAD, sitting in chair  HEENT:  Atraumatic, Normocephalic, EOMI  CHEST/LUNG: Clear to auscultation bilaterally; No wheeze  HEART: RRR, S1 and S2 No murmurs, rubs, or gallops  ABDOMEN: Soft, Nontender, Nondistended; Bowel sounds present  EXTREMITIES:  Right wound vac on, swelling at the surgical site.  NEURO: AAOx3, non-focal  SKIN: No rashes or lesions    LABS:                        11.7   11.37 )-----------( 270      ( 12 Mar 2023 06:52 )             37.2     03-12    133<L>  |  98  |  24<H>  ----------------------------<  127<H>  4.2   |  22  |  1.29    Ca    8.9      12 Mar 2023 06:53                  RADIOLOGY & ADDITIONAL TESTS:      Labs Personally Reviewed  Imaging Personally Reviewed  Consultant(s) Notes Reviewed

## 2023-03-12 NOTE — PROGRESS NOTE ADULT - PROBLEM SELECTOR PLAN 1
s/p revision of right knee arthroplasty. Wound vac on.   -f/u OR cx  -PT eval- home PT/OT  -management per primary ortho.

## 2023-03-12 NOTE — PROGRESS NOTE ADULT - ASSESSMENT
Mr. Ugarte is a 73yo man with pmhx of obesity, karl, CAD, prediabetes, right osteoarithtis s/p right total knee replacement 2016, admitted for elective revision. s/p revision of R knee arthroplasty POD2 with pain controlled.

## 2023-03-12 NOTE — PROGRESS NOTE ADULT - SUBJECTIVE AND OBJECTIVE BOX
ORTHO PROGRESS NOTE     Patient is status post revision right total knee replacement, POD #2  Pt seen and examined at bedside, denies SOB, CP, Dizziness. N/V/D/HA.    No significant overnight events. Pain well controlled. Patient ambulated with PT, recommended for home with home PT. Cleared from PT standpoint     Vital Signs Last 24 Hrs  T(C): 36.7 (12 Mar 2023 04:52), Max: 36.8 (11 Mar 2023 08:16)  T(F): 98.1 (12 Mar 2023 04:52), Max: 98.2 (11 Mar 2023 08:16)  HR: 67 (12 Mar 2023 05:28) (59 - 77)  BP: 119/68 (12 Mar 2023 04:52) (114/65 - 139/78)  BP(mean): --  RR: 18 (12 Mar 2023 04:52) (18 - 18)  SpO2: 97% (12 Mar 2023 05:28) (94% - 99%)    Parameters below as of 12 Mar 2023 04:52  Patient On (Oxygen Delivery Method): room air        Exam:   Gen: No apparent distress  RLE: Prevena clean dry and intact to the right knee. holding suction  BLE: motor intact EHL/FHL/TA/GS .  Sensation is grossly intact to light touch in the distal extremities.    Compartments are soft bilaterally.  Extremities are warm .  DP 2+ BLE     Labs:  CBC Full  -  ( 11 Mar 2023 06:06 )  WBC Count : 9.55 K/uL  RBC Count : 4.81 M/uL  Hemoglobin : 12.8 g/dL  Hematocrit : 40.8 %  Platelet Count - Automated : 284 K/uL  Mean Cell Volume : 84.8 fl  Mean Cell Hemoglobin : 26.6 pg  Mean Cell Hemoglobin Concentration : 31.4 gm/dL        03-11    138  |  102  |  15  ----------------------------<  157<H>  4.9   |  27  |  0.97    Ca    9.0      11 Mar 2023 06:06

## 2023-03-12 NOTE — DISCHARGE NOTE NURSING/CASE MANAGEMENT/SOCIAL WORK - PATIENT PORTAL LINK FT
You can access the FollowMyHealth Patient Portal offered by [facility] by registering at the following website: [email or web address]. By joining [facility]’s FollowMyHealth portal, you will also be able to view your health information using other applications (apps) compatible with our system.

## 2023-03-12 NOTE — DISCHARGE NOTE NURSING/CASE MANAGEMENT/SOCIAL WORK - NSDCVIVACCINE_GEN_ALL_CORE_FT
influenza, injectable, quadrivalent, preservative free; 26-Oct-2018 15:35; Jovana Bolden (RN); [employer]; [id number] (Exp. Date: 30-Jun-2019); IntraMuscular; Deltoid Right.; 0.5 milliLiter(s); VIS (VIS Published: 07-Aug-2015, VIS Presented: 26-Oct-2018);   
none

## 2023-03-12 NOTE — DISCHARGE NOTE NURSING/CASE MANAGEMENT/SOCIAL WORK - NSDCPEFALRISK_GEN_ALL_CORE
For information on Fall & Injury Prevention, visit: [email or web address] OR  [email or web address] OR  https://www.cdc.gov/steadi/patient.html

## 2023-03-12 NOTE — PROGRESS NOTE ADULT - SUBJECTIVE AND OBJECTIVE BOX
Cardiology    Mr Ugarte is a very pleasant 72yoM here for elective knee surgery.  He was pre-opped by Dr Elizondo, and is now doing well post op with no angina, palpitations, orthopnea or shortness of breath.  He is up and walking with a rollator with PT supervision.  A 10 pt ROS is otherwise negative.    Prior office note:  I had the pleasure of seeing your patient, Mr. Main Ugarte, in the office today for cardiac followup. As you know, he is a very  pleasant 71-year-old male with past medical history of hyperlipidemia, BPH, hypothyroidism, prediabetes, obstructive sleep apnea  with nonobstructive coronary artery disease, a former smoker who comes to the office for a cardiology evaluation.  The patient states that he may be in need of preoperative assessment from the cardiovascular perspective prior to elective right knee  surgery in March 2023. Otherwise, the patient states that he feels well without symptoms of dyspnea on exertion.  The patient denies chest pain, pleuritic chest pain, exertional chest pain, or syncope.    PMHX: GILSON; Prediabetes; Hypertension; BPH; Nonobstructive coronary artery disease.; Hypothyroidism    PSHX: Cardiac cath on 9/2021: 40% stenosis in RCA and distal circumflex, mild atherosclerosis in in LAD; Status post right knee  arthroscopic surgery.; s/p right knee surgery    Social HX: Alcohol - SOCIAL DRINKER - former, quits in 1995; Drugs - NO HISTORY OF DRUG ABUSE; Smoking -  FORMER SMOKER - smoked 5-6 cig per day for 5 years, quits 25 yoa  Fam HX: Father() - MI at age of 70 - Healthy    Allergies: No Known Drug Allergies  Medications: aspirin 81 mg tablet,delayed release 1 TABLET DAILY  atorvastatin 40 mg tablet 1 TABLET QHS  finasteride 5 mg tablet 1 TABLET DAILY  levothyroxine 50 mcg tablet 1 TABLET DAILY  omeprazole 40 mg capsule,delayed release 1 CAPSULE DAILY  tamsulosin 0.4 mg capsule 1 CAPSULE DAILY    Date of service 3/12- feeling well, [facility] with [facility].     acetaminophen     Tablet .. 975 milliGRAM(s) Oral every 8 hours  aluminum hydroxide/magnesium hydroxide/simethicone Suspension 30 milliLiter(s) Oral four times a day PRN  ascorbic acid 500 milliGRAM(s) Oral two times a day  aspirin enteric coated 81 milliGRAM(s) Oral two times a day  atorvastatin 40 milliGRAM(s) Oral at bedtime  bisacodyl Suppository 10 milliGRAM(s) Rectal once PRN  calcium carbonate 1250 mG  + Vitamin D (OsCal 500 + D) 1 Tablet(s) Oral three times a day  celecoxib 200 milliGRAM(s) Oral every 12 hours  cyanocobalamin 1000 MICROGram(s) Oral at bedtime  finasteride 5 milliGRAM(s) Oral daily  folic acid 1 milliGRAM(s) Oral daily  HYDROmorphone  Injectable 0.5 milliGRAM(s) IV Push once  levothyroxine 75 MICROGram(s) Oral daily  magnesium hydroxide Suspension 30 milliLiter(s) Oral daily PRN  multivitamin 1 Tablet(s) Oral daily  ondansetron Injectable 4 milliGRAM(s) IV Push every 6 hours PRN  oxyCODONE    IR 5 milliGRAM(s) Oral every 4 hours PRN  oxyCODONE    IR 10 milliGRAM(s) Oral every 4 hours PRN  pantoprazole    Tablet 40 milliGRAM(s) Oral before breakfast  polyethylene glycol 3350 17 Gram(s) Oral at bedtime  senna 2 Tablet(s) Oral at bedtime  tamsulosin 0.4 milliGRAM(s) Oral two times a day  traMADol 50 milliGRAM(s) Oral every 6 hours PRN                            11.7   11.37 )-----------( 270      ( 12 Mar 2023 06:52 )             37.2       03-12    133<L>  |  98  |  24<H>  ----------------------------<  127<H>  4.2   |  22  |  1.29    Ca    8.9      12 Mar 2023 06:53      T(C): 36.5 (03-12-23 @ 08:00), Max: 36.7 (03-12-23 @ 04:52)  HR: 68 (03-12-23 @ 09:52) (59 - 74)  BP: 138/73 (03-12-23 @ 08:00) (114/65 - 138/73)  RR: 18 (03-12-23 @ 08:00) (18 - 18)  SpO2: 97% (03-12-23 @ 09:52) (94% - 99%)  Wt(kg): --    I&O's Summary    11 Mar 2023 06:01  -  12 Mar 2023 07:00  --------------------------------------------------------  IN: 500 mL / OUT: 600 mL / NET: -100 mL        General: Well nourished, no acute distress, alert and oriented x 3  Head: normocephalic, no trauma  Neck: no JVD, no bruit, supple, not enlarged  CV: S1S2, no S3, regular rate, rhythm is SINUS, no murmurs.    Lungs: clear BL, no rales or wheezes  Abdomen: bowel sounds +, soft, nontender, nondistended  Extremities: no clubbing, cyanosis or edema  Neuro: Moves all 4 extremities, sensation intact x 4 extremities  Skin: warm and moist, normal turgor  Psych: Mood and affect are appropriate for circumstances  MSK: normal range of motion and strength x4 extremities.    Pre Op Echo 2/2023 with normal biventricular size and function.  Stress test 11/2022 with normal LV EF and perfusion.    Assessment and Plan:  Mr Ugarte is a very pleasant 71yoM with CAD, GILSON, HLD and pre-diabetes who is here for elective knee surgery.  His pre-op testing was uneventful, and he is now seen recovering well from surgery.    Prior to discharge, be sure to continue aspirin 81mg daily, atorvastatin for CAD prevention.  Caution with use of celebrex, aspirin, and ketorolac re: GI bleed risk.  Will follow with you.  Will have our office call to set up 2-3mo followup for routine visit.    Lewis Haq M.D.  Cardiac Electrophysiology  [phone]

## 2023-03-12 NOTE — PROGRESS NOTE ADULT - ASSESSMENT
A/P:  Pt is a 72 yr old male s/p revision right Knee Arthroplasty, POD #2    - Pain control/ Analgesia  - DVT ppx ASA 81 BID x 4 weeks, foot pumps  - Prevena incisional vac to the right knee - to be left intact, plan for removal on POD #6 and replaced with aquacel dressing. (supplied to patient at discharge)  - PT/OT - wbat/oob    - Incentive Spirometer  - GI prophylaxis: Protonix  - notify Ortho for questions   - Dispo: home today with home PT/nursing for prevena dressing change     Nelida Mesa PA-C  Orthopedic Surgery  Team Pager #[phone]

## 2023-03-13 ENCOUNTER — TRANSCRIPTION ENCOUNTER (OUTPATIENT)
Age: 73
End: 2023-03-13

## 2023-03-13 ENCOUNTER — NON-APPOINTMENT (OUTPATIENT)
Age: 73
End: 2023-03-13

## 2023-03-14 ENCOUNTER — TRANSCRIPTION ENCOUNTER (OUTPATIENT)
Age: 73
End: 2023-03-14

## 2023-03-14 ENCOUNTER — NON-APPOINTMENT (OUTPATIENT)
Age: 73
End: 2023-03-14

## 2023-03-15 ENCOUNTER — TRANSCRIPTION ENCOUNTER (OUTPATIENT)
Age: 73
End: 2023-03-15

## 2023-03-16 ENCOUNTER — TRANSCRIPTION ENCOUNTER (OUTPATIENT)
Age: 73
End: 2023-03-16

## 2023-03-16 LAB
CULTURE RESULTS: SIGNIFICANT CHANGE UP
SPECIMEN SOURCE: SIGNIFICANT CHANGE UP

## 2023-03-24 ENCOUNTER — APPOINTMENT (OUTPATIENT)
Dept: ORTHOPEDIC SURGERY | Facility: CLINIC | Age: 73
End: 2023-03-24
Payer: MEDICARE

## 2023-03-24 PROCEDURE — 99024 POSTOP FOLLOW-UP VISIT: CPT

## 2023-03-24 PROCEDURE — 73564 X-RAY EXAM KNEE 4 OR MORE: CPT | Mod: 26,RT

## 2023-04-08 LAB
CULTURE RESULTS: SIGNIFICANT CHANGE UP
SPECIMEN SOURCE: SIGNIFICANT CHANGE UP

## 2023-04-21 ENCOUNTER — APPOINTMENT (OUTPATIENT)
Dept: ORTHOPEDIC SURGERY | Facility: CLINIC | Age: 73
End: 2023-04-21
Payer: MEDICARE

## 2023-04-21 VITALS
TEMPERATURE: 97.7 F | BODY MASS INDEX: 30.48 KG/M2 | OXYGEN SATURATION: 98 % | WEIGHT: 172 LBS | SYSTOLIC BLOOD PRESSURE: 113 MMHG | HEIGHT: 63 IN | HEART RATE: 67 BPM | DIASTOLIC BLOOD PRESSURE: 71 MMHG

## 2023-04-21 PROCEDURE — 99024 POSTOP FOLLOW-UP VISIT: CPT

## 2023-04-21 NOTE — PHYSICAL EXAM
[de-identified] : Well developed, well nourished in no apparent distress, awake, alert and orientated to person, place and time with appropriate mood and affect\par Respirations are even and unlabored. Gait evaluation does not reveal a limp. There is no inguinal adenopathy. The affected limb is well-perfused with palpable pedal pulse, without skin lesions, shows a grossly normal motor and sensory examination. Incision is healed. Knee motion is 0-120

## 2023-04-21 NOTE — HISTORY OF PRESENT ILLNESS
[de-identified] : Status-post right  total knee  arthroplasty here for routine postoperative evaluation. Excellent progress is noted in terms of pain and restoration of function. Pain is well controlled with oral medications. There has been no change in medical health since discharge. The patient does not require assistive devices.

## 2023-04-21 NOTE — DISCUSSION/SUMMARY
[de-identified] : The patient is doing well after joint replacement surgery. WBAT. Return around the 6 month anniversary from surgery for follow-up evaluation.

## 2023-04-25 NOTE — ASU PREOP CHECKLIST - SITE MARKED BY SURGEON
SUBJECTIVE: Mihai Donaldson is a 63 y.o. female who returns to the office with chief complaint of painful fungal toenails. Patient relates toe nails are thickened/difficult to trim as well as painful with ambulation and with shoe gear. Chief Complaint   Patient presents with    Nail Problem     Nail trim/last saw Natividad Murrell CNP 1/19/23    Diabetes     Last a1c 5.6     Review of Systems   Constitutional:  Negative for activity change, appetite change, chills, diaphoresis, fatigue and fever. Respiratory:  Negative for shortness of breath. Cardiovascular:  Negative for leg swelling. Gastrointestinal:  Negative for diarrhea and nausea. Endocrine: Negative for cold intolerance, heat intolerance and polyuria. Musculoskeletal:  Positive for arthralgias. Negative for back pain, gait problem, joint swelling and myalgias. Skin:  Negative for color change, pallor, rash and wound. Allergic/Immunologic: Negative for environmental allergies and food allergies. Neurological:  Negative for dizziness, weakness, light-headedness and numbness. Hematological:  Does not bruise/bleed easily. Psychiatric/Behavioral:  Negative for behavioral problems, confusion and self-injury. The patient is not nervous/anxious. OBJECTIVE: Clinical evaluation of patient reveals nails 1,2,3,4,5 of the right foot and nails 1,2,3,4,5 of the left foot to present with thickness, elongation, discoloration, brittleness, and subungual debris. There was pain with palpation and debridement of the toenails of the bilateral feet. No open lesions noted to either foot today. Class A Findings (1 needed)   [] Non-traumatic amputation of foot or integral skeleton portion thereof. [] Q7.      Class B Findings (2 needed)   1. [] Absent posterior tibial pulse   2. [] Absent dorsalis pedis pulse   3.  [] Advanced trophic changes; three of the following are required:   ·         [] hair growth (decrease or absence)   ·         [] nail changes
yes

## 2023-04-26 ENCOUNTER — APPOINTMENT (OUTPATIENT)
Dept: ORTHOPEDIC SURGERY | Facility: CLINIC | Age: 73
End: 2023-04-26
Payer: MEDICARE

## 2023-04-26 DIAGNOSIS — M25.561 PAIN IN RIGHT KNEE: ICD-10-CM

## 2023-04-26 LAB
CULTURE RESULTS: SIGNIFICANT CHANGE UP
SPECIMEN SOURCE: SIGNIFICANT CHANGE UP

## 2023-04-26 PROCEDURE — 73562 X-RAY EXAM OF KNEE 3: CPT | Mod: RT

## 2023-04-26 PROCEDURE — 99213 OFFICE O/P EST LOW 20 MIN: CPT | Mod: 24

## 2023-04-27 NOTE — DISCUSSION/SUMMARY
[de-identified] : 72-year-old gentleman with right anterior knee traumatic hematoma after a fall today, 4/26/23.\par -X-ray and physical exam findings were discussed with the patient\par -Weightbearing as tolerated right knee\par -ACE bandage provided for compression\par -Ice and elevation for swelling\par -Allowed to continue Physical Therapy as tolerated\par -Follow-up in 1 week with Dr. Schofield to assess resolution of the hematoma\par -Findings were discussed with Dr. Schofield\par -All the patient's questions and concerns were addressed during this visit\par

## 2023-04-27 NOTE — HISTORY OF PRESENT ILLNESS
[de-identified] : GLENNY Chi is a 72 y.o. gentleman who presents to the office with right knee pain. he has a history of TKR first in 2016 and then he had a revision right total knee replacement with Dr. Schofield on 3/10/23. He had been doing well after his post-op appointment and has been participating in PT twice a week. This morning he missed a step and fell on his right knee onto the floor today 4/26/23. He noticed an increase in swelling of his right knee. He has been walking on his right leg since the fall without an assistive device but notes a burning pain at the anterior aspect of his knee. He did not take any pain medication today. \par \par Referred by Dr. Schofield.

## 2023-04-27 NOTE — PHYSICAL EXAM
[de-identified] : The patient is sitting comfortably in the exam room. \par Right leg.\par -Skin is intact, swelling and ecchymosis over the anterior aspect of the knee\par -Range of motion knee 0-120\par -Tenderness to palpation over the anterior aspect of the knee where the swelling and ecchymoses is\par -Sensation is intact L1-S1\par -5/5 EHL, FHL, TA, GS, quadriceps, hamstrings\par -Foot is warm and well-perfused, palpable dorsalis pedis pulse\par  [de-identified] : Xrays of the right knee were taken in the office today, including AP oblique and lateral.  X-rays show good overall alignment of the knee with good positions of the femoral and tibial components.  The patella is in good position without any evidence of fracture.  No periprosthetic fractures in the proximal tibia or distal femur.

## 2023-05-03 ENCOUNTER — APPOINTMENT (OUTPATIENT)
Dept: ORTHOPEDIC SURGERY | Facility: CLINIC | Age: 73
End: 2023-05-03
Payer: MEDICARE

## 2023-05-03 VITALS — BODY MASS INDEX: 30.48 KG/M2 | WEIGHT: 172 LBS | HEIGHT: 63 IN

## 2023-05-03 PROCEDURE — 99024 POSTOP FOLLOW-UP VISIT: CPT

## 2023-05-03 PROCEDURE — 20610 DRAIN/INJ JOINT/BURSA W/O US: CPT | Mod: 58,RT

## 2023-05-03 NOTE — PROCEDURE
[de-identified] : Informed consent for the right knee prepatellar bursa aspiration was obtained. All risks, benefits and alternatives were discussed. These included but were not limited to bleeding, infection, allergic reaction and reaccumulation of fluid.  All questions were answered. A time out was performed. The right knee was prepped and draped in sterile fashion. Using sterile technique, the right knee prepatellar bursa was aspirated of approximately 2 cc of blood using a 18-gauge needle. A sterile dressing was applied. Post aspiration instructions were reviewed. The patient tolerated the procedure well.\par

## 2023-05-03 NOTE — HISTORY OF PRESENT ILLNESS
[de-identified] : Status-post right total knee arthroplasty here for routine postoperative evaluation. Excellent progress is noted in terms of pain and restoration of function.  He unfortunately had a fall last week onto his operative knee which was a mechanical fall when he tripped and fell.  He saw Dr. Schwartz at that time ruled out complication of fracture but he had a large hematoma.  He has been holding on his blood thinner since.  Pain is well controlled with oral medications. There has been no change in medical health since discharge. The patient does require assistive devices. \par

## 2023-05-03 NOTE — DISCUSSION/SUMMARY
[de-identified] : This patient is recovering from a revision right total knee arthroplasty now status post fall.  He has prepatellar hemorrhagic bursitis.  I attempted to aspirate this today was only able to pull out 2 cc of fluid from this.  For the superficial skin epidermal lysis I dressed this with Xeroform, Telfa, Kerlix and Ace wrap.  I gave him supplies to change this once a day for the next week.  He should then switch back to mupirocin coverage for this after that.  Follow-up me in 2 weeks for wound check.  Ice and elevate.

## 2023-05-03 NOTE — PHYSICAL EXAM
[de-identified] : Patient is well nourished, well-developed, in no acute distress, with appropriate mood and affect. The patient is oriented to time, place, and person. Respirations are even and unlabored. Examination reveals satisfactory wound healing.  Lateral to the wound there fluctuant swelling in the prepatellar bursa with some superficial skin epidermolysis where there was a blister from the fall.  No surrounding erythema.  Is no surrounding erythema. Motion is good and relatively pain free. Active knee range of motion is 0-110.

## 2023-05-18 ENCOUNTER — APPOINTMENT (OUTPATIENT)
Dept: ORTHOPEDIC SURGERY | Facility: CLINIC | Age: 73
End: 2023-05-18
Payer: MEDICARE

## 2023-05-18 VITALS — HEIGHT: 63 IN | BODY MASS INDEX: 30.48 KG/M2 | WEIGHT: 172 LBS

## 2023-05-18 DIAGNOSIS — M70.41 PREPATELLAR BURSITIS, RIGHT KNEE: ICD-10-CM

## 2023-05-18 PROCEDURE — 99214 OFFICE O/P EST MOD 30 MIN: CPT | Mod: 24,25

## 2023-05-18 PROCEDURE — 20610 DRAIN/INJ JOINT/BURSA W/O US: CPT | Mod: 79,RT

## 2023-05-18 NOTE — HISTORY OF PRESENT ILLNESS
[de-identified] : Status-post right total knee arthroplasty here for routine postoperative evaluation.  Postoperatively he developed a hemorrhagic prepatellar bursitis after mechanical fall.  His skin has been healing.

## 2023-05-18 NOTE — PROCEDURE
[de-identified] : Informed consent for the right knee prepatellar bursa aspiration was obtained. All risks, benefits and alternatives were discussed. These included but were not limited to bleeding, infection, allergic reaction and reaccumulation of fluid.  All questions were answered. A time out was performed. The right knee was prepped and draped in sterile fashion. Using sterile technique, the right knee prepatellar bursa was aspirated of approximately 6 cc of blood using a 18-gauge needle. A sterile dressing was applied. Post aspiration instructions were reviewed. The patient tolerated the procedure well.\par

## 2023-05-18 NOTE — PHYSICAL EXAM
[de-identified] : Patient is well nourished, well-developed, in no acute distress, with appropriate mood and affect. The patient is oriented to time, place, and person. Respirations are even and unlabored. Examination reveals satisfactory wound healing.  There is prepatellar bursitis noted without surrounding erythema and the skin is now healed from the previous epidermal lysis.  No surrounding erythema.  Is no surrounding erythema. Motion is good and relatively pain free. Active knee range of motion is 0-120

## 2023-05-18 NOTE — DISCUSSION/SUMMARY
[de-identified] : This patient is recovering from a revision right total knee arthroplasty now status post fall.  He has prepatellar hemorrhagic bursitis.  Again, I attempted to aspirate this today was only able to pull out 6 cc of fluid from this.  Follow-up is recommended in 1 month.  Continue to ice and elevate.

## 2023-06-15 ENCOUNTER — APPOINTMENT (OUTPATIENT)
Dept: ORTHOPEDIC SURGERY | Facility: CLINIC | Age: 73
End: 2023-06-15
Payer: MEDICARE

## 2023-06-15 VITALS — HEART RATE: 65 BPM | WEIGHT: 172 LBS | TEMPERATURE: 98 F | BODY MASS INDEX: 31.65 KG/M2 | HEIGHT: 62 IN

## 2023-06-15 PROCEDURE — 99213 OFFICE O/P EST LOW 20 MIN: CPT

## 2023-06-15 NOTE — PROCEDURE
[de-identified] : Informed consent for the right knee prepatellar bursa aspiration was obtained. All risks, benefits and alternatives were discussed. These included but were not limited to bleeding, infection, allergic reaction and reaccumulation of fluid.  All questions were answered. A time out was performed. The right knee was prepped and draped in sterile fashion. Using sterile technique, the right knee prepatellar bursa was aspirated of approximately 6 cc of blood using a 18-gauge needle. A sterile dressing was applied. Post aspiration instructions were reviewed. The patient tolerated the procedure well.\par

## 2023-06-15 NOTE — HISTORY OF PRESENT ILLNESS
[de-identified] : Status-post right total knee arthroplasty here for routine postoperative evaluation.  Postoperatively he developed a hemorrhagic prepatellar bursitis after mechanical fall which has healed and looks great today

## 2023-06-15 NOTE — PHYSICAL EXAM
[de-identified] : Well developed, well nourished in no apparent distress, awake, alert and orientated to person, place and time with appropriate mood and affect\par Respirations are even and unlabored. Gait evaluation does not reveal a limp. There is no inguinal adenopathy. The affected limb is well-perfused with palpable pedal pulse, without skin lesions, shows a grossly normal motor and sensory examination. Incision is healed. Bursitis is resolved Knee motion is 0-120

## 2023-06-15 NOTE — DISCUSSION/SUMMARY
[de-identified] : The patient is doing well after joint replacement surgery. Continue to be weight bearing as tolerated without restriction. We recommend to continue long term exercise program and stretching exercises Follow up is recommended at the 6 month anniversary from surgery.

## 2023-09-17 ENCOUNTER — NON-APPOINTMENT (OUTPATIENT)
Age: 73
End: 2023-09-17

## 2023-09-21 ENCOUNTER — APPOINTMENT (OUTPATIENT)
Dept: ORTHOPEDIC SURGERY | Facility: CLINIC | Age: 73
End: 2023-09-21

## 2023-11-07 NOTE — ASU DISCHARGE PLAN (ADULT/PEDIATRIC) - PATIENT BELONGINGS
Thank you for your visit today.    Please complete your labs prior to follow up with Dr. Petersen in      If you need a refill on your prescription, please call your pharmacy and let them know. Please be proactive and call before your medication runs out. The pharmacy will then contact us for the refill. Please allow 24-48 hours for the refill to be processed.   Patients are responsible to complete lab work before every office visit.  If Dr. Petersen has ordered additional laboratory or radiology testing to be done before your next scheduled office visit, those results will be discussed with you at that upcoming visit. This will allow you the opportunity to go over the results in person with Dr. Petersen.   If your results require immediate intervention, you will be contacted sooner by phone call.      Patient's belongings returned

## 2023-12-10 ENCOUNTER — NON-APPOINTMENT (OUTPATIENT)
Age: 73
End: 2023-12-10

## 2024-01-03 ENCOUNTER — APPOINTMENT (OUTPATIENT)
Dept: ORTHOPEDIC SURGERY | Facility: CLINIC | Age: 74
End: 2024-01-03
Payer: MEDICARE

## 2024-01-03 VITALS — HEIGHT: 63 IN | WEIGHT: 172 LBS | BODY MASS INDEX: 30.48 KG/M2

## 2024-01-03 DIAGNOSIS — Z96.651 PRESENCE OF RIGHT ARTIFICIAL KNEE JOINT: ICD-10-CM

## 2024-01-03 PROCEDURE — 99213 OFFICE O/P EST LOW 20 MIN: CPT

## 2024-01-03 PROCEDURE — 73564 X-RAY EXAM KNEE 4 OR MORE: CPT | Mod: RT

## 2024-01-03 NOTE — DISCUSSION/SUMMARY
[de-identified] : The patient is doing well after right total knee arthroplasty revision. Continue to be weight bearing as tolerated without restriction. Daily home exercise program recommended. Follow up is recommended in 2 years.

## 2024-01-03 NOTE — PHYSICAL EXAM
[de-identified] : Patient is well nourished, well-developed, in no acute distress, with appropriate mood and affect. The patient is oriented to time, place, and person. Respirations are even and unlabored. Gait evaluation does not reveal a limp. There is no inguinal adenopathy. Examination of the contralateral knee shows normal range of motion, strength, no tenderness, and intact skin. The operative limb is well-perfused, has a well appearing surgical scar, and shows a grossly normal motor and sensory examination. Knee motion is painless and the right knee moves from 0 to 125 degrees. The knee is stable within that range-of-motion to AP and ML stress. The alignment of the knee is neutral. Muscle strength is normal. Quadriceps and hamstring muscle strength is normal bilaterally. Pedal pulses are palpable.   [de-identified] :  AP, lateral, tunnel, and sunrise knee x-rays of the right knee were ordered and obtained in the office and demonstrate satisfactory position and alignment of the components are present. No signs of loosening are seen.

## 2024-01-03 NOTE — HISTORY OF PRESENT ILLNESS
[de-identified] : This is very nice 72-year-old gentleman here for interim evaluation of right total knee arthroplasty revision. The patient reports good pain relief since surgery in the replaced joint and satisfactory restoration of function in terms of activities of daily living. The patient no longer requires an assistive device for ambulation, does not require pain medication, and completed postoperative physical therapy. They report unlimited activities of daily living and unlimited walking tolerance. The patient is thrilled with their progress from surgery and ultimate outcome.

## 2024-04-11 NOTE — H&P PST ADULT - FUNCTIONAL ASSESSMENT - DAILY ACTIVITY 2.
Detail Level: Detailed Quality 431: Preventive Care And Screening: Unhealthy Alcohol Use - Screening: Patient not identified as an unhealthy alcohol user when screened for unhealthy alcohol use using a systematic screening method Quality 226: Preventive Care And Screening: Tobacco Use: Screening And Cessation Intervention: Patient screened for tobacco use and is an ex/non-smoker 4 = No assist / stand by assistance

## 2024-05-30 NOTE — OCCUPATIONAL THERAPY INITIAL EVALUATION ADULT - LIGHT TOUCH SENSATION, LLE, REHAB EVAL
Patient attended her first session of cardiac rehab today. She mentioned that her blood pressures at her appointment with Dr. Durdevic this morning were on the lower side, and that Dr. Durdevic wanted to know if they were low while at rehab.     Prior to exercise, her resting BP taken automatically was 121/62, and 126/52 taken manually. While walking on the treadmill for a few minutes, it increased to 158/52. And, her recovery BP was 108/46. Patient tolerated exercise well.    Patient also completed some questionnaires at her admission appointment, including the PHQ-9 depression screening. She scored a 12, which ranks her in the 'moderate depression' category. She answered \"Not at all\" to all of the questions except:   Little interest or pleasure in doing things (answered \"Nearly Everyday\")  Trouble falling/staying asleep (answered \"Nearly Everyday\")  Feeling tired or having little energy (answered \"Nearly Everyday\")  Poor appetite (answered \"Nearly Everyday\")    After speaking with patient more, she does not report feeling depressed. It seems that her answers are more so based on her current circumstances and her current physical health conditions.    Please review and follow-up with patient if necessary. Thank you!   within normal limits

## 2024-12-12 NOTE — ASU PREOP CHECKLIST - SIDE RAILS UP
Order/Referral Request    Who is requesting: Wilmer    Orders being requested: Cpap suppllies    Reason service is needed/diagnosis: apnea    When are orders needed by: asap    Has this been discussed with Provider: No    Does patient have a preference on a Group/Provider/Facility? [facility]     Does patient have an appointment scheduled?: No    Where to send orders: Place orders within Epic    Could we send this information to you in [facility] or would you prefer to receive a phone call?:   Patient would prefer a phone call   Okay to leave a detailed message?: Yes at Home number on file [phone] (home)     done

## 2025-02-03 ENCOUNTER — NON-APPOINTMENT (OUTPATIENT)
Age: 75
End: 2025-02-03

## 2025-02-04 ENCOUNTER — NON-APPOINTMENT (OUTPATIENT)
Age: 75
End: 2025-02-04

## 2025-02-04 ENCOUNTER — APPOINTMENT (OUTPATIENT)
Dept: CARDIOLOGY | Facility: CLINIC | Age: 75
End: 2025-02-04

## 2025-02-10 ENCOUNTER — NON-APPOINTMENT (OUTPATIENT)
Age: 75
End: 2025-02-10

## 2025-02-10 ENCOUNTER — APPOINTMENT (OUTPATIENT)
Dept: CARDIOLOGY | Facility: CLINIC | Age: 75
End: 2025-02-10
Payer: MEDICARE

## 2025-02-10 VITALS
BODY MASS INDEX: 31 KG/M2 | OXYGEN SATURATION: 97 % | WEIGHT: 175 LBS | DIASTOLIC BLOOD PRESSURE: 70 MMHG | SYSTOLIC BLOOD PRESSURE: 117 MMHG | HEART RATE: 69 BPM

## 2025-02-10 DIAGNOSIS — Z00.00 ENCOUNTER FOR GENERAL ADULT MEDICAL EXAMINATION W/OUT ABNORMAL FINDINGS: ICD-10-CM

## 2025-02-10 DIAGNOSIS — T84.82XD FIBROSIS DUE TO INTERNAL ORTHOPEDIC PROSTHETIC DEVICES, IMPLANTS AND GRAFTS, SUBSEQUENT ENCOUNTER: ICD-10-CM

## 2025-02-10 PROCEDURE — 93798 PHYS/QHP OP CAR RHAB W/ECG: CPT

## 2025-02-10 PROCEDURE — 99204 OFFICE O/P NEW MOD 45 MIN: CPT | Mod: 25

## 2025-02-10 PROCEDURE — 93000 ELECTROCARDIOGRAM COMPLETE: CPT | Mod: 59

## 2025-02-11 ENCOUNTER — APPOINTMENT (OUTPATIENT)
Dept: ORTHOPEDIC SURGERY | Facility: CLINIC | Age: 75
End: 2025-02-11
Payer: MEDICARE

## 2025-02-11 VITALS — HEIGHT: 63 IN | BODY MASS INDEX: 31.18 KG/M2 | WEIGHT: 176 LBS

## 2025-02-11 DIAGNOSIS — Z96.651 PRESENCE OF RIGHT ARTIFICIAL KNEE JOINT: ICD-10-CM

## 2025-02-11 PROCEDURE — 99213 OFFICE O/P EST LOW 20 MIN: CPT

## 2025-02-11 PROCEDURE — G2211 COMPLEX E/M VISIT ADD ON: CPT

## 2025-02-12 ENCOUNTER — APPOINTMENT (OUTPATIENT)
Dept: CARDIOLOGY | Facility: CLINIC | Age: 75
End: 2025-02-12
Payer: MEDICARE

## 2025-02-12 PROCEDURE — 93798 PHYS/QHP OP CAR RHAB W/ECG: CPT

## 2025-02-13 ENCOUNTER — APPOINTMENT (OUTPATIENT)
Dept: CARDIOLOGY | Facility: CLINIC | Age: 75
End: 2025-02-13
Payer: MEDICARE

## 2025-02-13 PROCEDURE — 93797 PHYS/QHP OP CAR RHAB WO ECG: CPT

## 2025-02-19 ENCOUNTER — APPOINTMENT (OUTPATIENT)
Dept: CARDIOLOGY | Facility: CLINIC | Age: 75
End: 2025-02-19
Payer: MEDICARE

## 2025-02-19 PROCEDURE — 93797 PHYS/QHP OP CAR RHAB WO ECG: CPT

## 2025-02-20 ENCOUNTER — APPOINTMENT (OUTPATIENT)
Dept: CARDIOLOGY | Facility: CLINIC | Age: 75
End: 2025-02-20
Payer: MEDICARE

## 2025-02-20 PROCEDURE — 93797 PHYS/QHP OP CAR RHAB WO ECG: CPT

## 2025-02-24 ENCOUNTER — APPOINTMENT (OUTPATIENT)
Dept: CARDIOLOGY | Facility: CLINIC | Age: 75
End: 2025-02-24
Payer: MEDICARE

## 2025-02-24 PROCEDURE — 93797 PHYS/QHP OP CAR RHAB WO ECG: CPT

## 2025-02-26 ENCOUNTER — APPOINTMENT (OUTPATIENT)
Dept: CARDIOLOGY | Facility: CLINIC | Age: 75
End: 2025-02-26
Payer: MEDICARE

## 2025-02-26 PROCEDURE — 93797 PHYS/QHP OP CAR RHAB WO ECG: CPT

## 2025-02-27 ENCOUNTER — APPOINTMENT (OUTPATIENT)
Dept: CARDIOLOGY | Facility: CLINIC | Age: 75
End: 2025-02-27
Payer: MEDICARE

## 2025-02-27 PROCEDURE — 93797 PHYS/QHP OP CAR RHAB WO ECG: CPT

## 2025-03-03 ENCOUNTER — APPOINTMENT (OUTPATIENT)
Dept: CARDIOLOGY | Facility: CLINIC | Age: 75
End: 2025-03-03
Payer: MEDICARE

## 2025-03-03 PROCEDURE — 93797 PHYS/QHP OP CAR RHAB WO ECG: CPT

## 2025-03-05 ENCOUNTER — APPOINTMENT (OUTPATIENT)
Dept: CARDIOLOGY | Facility: CLINIC | Age: 75
End: 2025-03-05
Payer: MEDICARE

## 2025-03-05 PROCEDURE — 93797 PHYS/QHP OP CAR RHAB WO ECG: CPT

## 2025-03-06 ENCOUNTER — APPOINTMENT (OUTPATIENT)
Dept: CARDIOLOGY | Facility: CLINIC | Age: 75
End: 2025-03-06
Payer: MEDICARE

## 2025-03-06 PROCEDURE — 93797 PHYS/QHP OP CAR RHAB WO ECG: CPT

## 2025-03-10 ENCOUNTER — APPOINTMENT (OUTPATIENT)
Dept: CARDIOLOGY | Facility: CLINIC | Age: 75
End: 2025-03-10
Payer: MEDICARE

## 2025-03-10 PROCEDURE — 93797 PHYS/QHP OP CAR RHAB WO ECG: CPT

## 2025-03-12 ENCOUNTER — APPOINTMENT (OUTPATIENT)
Dept: CARDIOLOGY | Facility: CLINIC | Age: 75
End: 2025-03-12
Payer: MEDICARE

## 2025-03-12 PROCEDURE — 93797 PHYS/QHP OP CAR RHAB WO ECG: CPT

## 2025-03-13 ENCOUNTER — APPOINTMENT (OUTPATIENT)
Dept: CARDIOLOGY | Facility: CLINIC | Age: 75
End: 2025-03-13
Payer: MEDICARE

## 2025-03-13 PROCEDURE — 93797 PHYS/QHP OP CAR RHAB WO ECG: CPT

## 2025-03-17 ENCOUNTER — APPOINTMENT (OUTPATIENT)
Dept: CARDIOLOGY | Facility: CLINIC | Age: 75
End: 2025-03-17
Payer: MEDICARE

## 2025-03-17 PROCEDURE — 93797 PHYS/QHP OP CAR RHAB WO ECG: CPT

## 2025-03-19 ENCOUNTER — APPOINTMENT (OUTPATIENT)
Dept: CARDIOLOGY | Facility: CLINIC | Age: 75
End: 2025-03-19
Payer: MEDICARE

## 2025-03-19 PROCEDURE — 93797 PHYS/QHP OP CAR RHAB WO ECG: CPT

## 2025-03-20 ENCOUNTER — APPOINTMENT (OUTPATIENT)
Dept: CARDIOLOGY | Facility: CLINIC | Age: 75
End: 2025-03-20
Payer: MEDICARE

## 2025-03-20 PROCEDURE — 93797 PHYS/QHP OP CAR RHAB WO ECG: CPT

## 2025-03-24 ENCOUNTER — APPOINTMENT (OUTPATIENT)
Dept: CARDIOLOGY | Facility: CLINIC | Age: 75
End: 2025-03-24
Payer: MEDICARE

## 2025-03-24 PROCEDURE — 93797 PHYS/QHP OP CAR RHAB WO ECG: CPT

## 2025-03-26 ENCOUNTER — APPOINTMENT (OUTPATIENT)
Dept: CARDIOLOGY | Facility: CLINIC | Age: 75
End: 2025-03-26
Payer: MEDICARE

## 2025-03-26 PROCEDURE — 93797 PHYS/QHP OP CAR RHAB WO ECG: CPT

## 2025-03-27 ENCOUNTER — APPOINTMENT (OUTPATIENT)
Dept: CARDIOLOGY | Facility: CLINIC | Age: 75
End: 2025-03-27
Payer: MEDICARE

## 2025-03-27 PROCEDURE — 93797 PHYS/QHP OP CAR RHAB WO ECG: CPT

## 2025-03-31 ENCOUNTER — APPOINTMENT (OUTPATIENT)
Dept: CARDIOLOGY | Facility: CLINIC | Age: 75
End: 2025-03-31
Payer: MEDICARE

## 2025-03-31 PROCEDURE — 93797 PHYS/QHP OP CAR RHAB WO ECG: CPT

## 2025-04-02 ENCOUNTER — APPOINTMENT (OUTPATIENT)
Dept: CARDIOLOGY | Facility: CLINIC | Age: 75
End: 2025-04-02
Payer: MEDICARE

## 2025-04-02 PROCEDURE — 93797 PHYS/QHP OP CAR RHAB WO ECG: CPT

## 2025-04-03 ENCOUNTER — APPOINTMENT (OUTPATIENT)
Dept: CARDIOLOGY | Facility: CLINIC | Age: 75
End: 2025-04-03
Payer: MEDICARE

## 2025-04-03 PROCEDURE — 93797 PHYS/QHP OP CAR RHAB WO ECG: CPT

## 2025-04-07 ENCOUNTER — APPOINTMENT (OUTPATIENT)
Dept: CARDIOLOGY | Facility: CLINIC | Age: 75
End: 2025-04-07
Payer: MEDICARE

## 2025-04-07 PROCEDURE — 93797 PHYS/QHP OP CAR RHAB WO ECG: CPT

## 2025-04-09 ENCOUNTER — APPOINTMENT (OUTPATIENT)
Dept: CARDIOLOGY | Facility: CLINIC | Age: 75
End: 2025-04-09
Payer: MEDICARE

## 2025-04-09 PROCEDURE — 93797 PHYS/QHP OP CAR RHAB WO ECG: CPT

## 2025-04-10 ENCOUNTER — APPOINTMENT (OUTPATIENT)
Dept: CARDIOLOGY | Facility: CLINIC | Age: 75
End: 2025-04-10
Payer: MEDICARE

## 2025-04-10 PROCEDURE — 93797 PHYS/QHP OP CAR RHAB WO ECG: CPT

## 2025-04-14 ENCOUNTER — APPOINTMENT (OUTPATIENT)
Dept: CARDIOLOGY | Facility: CLINIC | Age: 75
End: 2025-04-14
Payer: MEDICARE

## 2025-04-14 PROCEDURE — 93797 PHYS/QHP OP CAR RHAB WO ECG: CPT

## 2025-04-16 ENCOUNTER — APPOINTMENT (OUTPATIENT)
Dept: CARDIOLOGY | Facility: CLINIC | Age: 75
End: 2025-04-16
Payer: MEDICARE

## 2025-04-16 PROCEDURE — 93797 PHYS/QHP OP CAR RHAB WO ECG: CPT

## 2025-04-17 ENCOUNTER — APPOINTMENT (OUTPATIENT)
Dept: CARDIOLOGY | Facility: CLINIC | Age: 75
End: 2025-04-17
Payer: MEDICARE

## 2025-04-17 PROCEDURE — 93797 PHYS/QHP OP CAR RHAB WO ECG: CPT

## 2025-04-21 ENCOUNTER — APPOINTMENT (OUTPATIENT)
Dept: CARDIOLOGY | Facility: CLINIC | Age: 75
End: 2025-04-21
Payer: MEDICARE

## 2025-04-21 PROCEDURE — 93797 PHYS/QHP OP CAR RHAB WO ECG: CPT

## 2025-04-23 ENCOUNTER — APPOINTMENT (OUTPATIENT)
Dept: CARDIOLOGY | Facility: CLINIC | Age: 75
End: 2025-04-23
Payer: MEDICARE

## 2025-04-23 PROCEDURE — 93797 PHYS/QHP OP CAR RHAB WO ECG: CPT

## 2025-04-28 ENCOUNTER — APPOINTMENT (OUTPATIENT)
Dept: CARDIOLOGY | Facility: CLINIC | Age: 75
End: 2025-04-28
Payer: MEDICARE

## 2025-04-28 PROCEDURE — 93797 PHYS/QHP OP CAR RHAB WO ECG: CPT

## 2025-04-30 ENCOUNTER — APPOINTMENT (OUTPATIENT)
Dept: CARDIOLOGY | Facility: CLINIC | Age: 75
End: 2025-04-30
Payer: MEDICARE

## 2025-04-30 PROCEDURE — 93797 PHYS/QHP OP CAR RHAB WO ECG: CPT

## 2025-05-01 ENCOUNTER — APPOINTMENT (OUTPATIENT)
Dept: CARDIOLOGY | Facility: CLINIC | Age: 75
End: 2025-05-01
Payer: MEDICARE

## 2025-05-01 PROCEDURE — 93797 PHYS/QHP OP CAR RHAB WO ECG: CPT

## 2025-05-05 ENCOUNTER — APPOINTMENT (OUTPATIENT)
Dept: CARDIOLOGY | Facility: CLINIC | Age: 75
End: 2025-05-05
Payer: MEDICARE

## 2025-05-05 PROCEDURE — 93797 PHYS/QHP OP CAR RHAB WO ECG: CPT

## 2025-05-08 ENCOUNTER — APPOINTMENT (OUTPATIENT)
Dept: CARDIOLOGY | Facility: CLINIC | Age: 75
End: 2025-05-08
Payer: MEDICARE

## 2025-05-08 PROCEDURE — 93797 PHYS/QHP OP CAR RHAB WO ECG: CPT

## 2025-06-17 NOTE — CONSULT NOTE ADULT - CONSULT REQUESTED DATE/TIME
12:19 PM    Reason for Call: Phone Call    Description: [facility] is calling on semaglutide (OZEMPIC) 2 MG/3ML pen.  sent in an increase a little while ago so they are questioning this RX. Should it be the higher dose.    Was an appointment offered for this call? No  If yes : Appointment type              Date    Preferred method for responding to this message: Telephone Call  What is your phone number ?  [phone]    If we cannot reach you directly, may we leave a detailed response at the number you provided? Yes    Can this message wait until your PCP/provider returns, if available today? YES, Provider is in    Vika Dodd   23-Oct-2018 18:52

## (undated) DEVICE — WARMING BLANKET UPPER ADULT

## (undated) DEVICE — SOL INJ NS 0.9% 1000ML

## (undated) DEVICE — SHAVER BLADE ULTRAGATOR 3.5MM

## (undated) DEVICE — MAKO VIZADISC KNEE TRACKING KIT

## (undated) DEVICE — ELCTR BOVIE PENCIL SMOKE EVACUATION

## (undated) DEVICE — POSITIONER CARDIAC BUMP

## (undated) DEVICE — HOOD FLYTE STRYKER HELMET SHIELD

## (undated) DEVICE — SUT STRATAFIX SYMMETRIC PDS PLUS 1 18" CTX VIOLET

## (undated) DEVICE — WAND COBLATION WEREWOLF FLOW 90

## (undated) DEVICE — MAKO BLADE STANDARD

## (undated) DEVICE — SOL IRR POUR NS 0.9% 500ML

## (undated) DEVICE — SUT QUILL MONODERM 0 1/2 CIRCLE TAPR 45CM 26MM

## (undated) DEVICE — CANISTER SUCTION LID GUARD 3000CC

## (undated) DEVICE — CANISTER SUCTION LINER 1500 CC

## (undated) DEVICE — SUT PDO 2 1/2 CIRCLE 40MM NDL 45CM

## (undated) DEVICE — WAND COBLATION WEREWOLF FLOW 50

## (undated) DEVICE — TUBING SET GRAVITY 4 SPIKE

## (undated) DEVICE — TOURNIQUET CUFF 34" DUAL PORT W PLC

## (undated) DEVICE — GLV 8 PROTEXIS (WHITE)

## (undated) DEVICE — SOL INJ NS 0.9% 500ML 1-PORT

## (undated) DEVICE — STRYKER INTERPULSE HANDPIECE W IRR SUCTION TUBE

## (undated) DEVICE — POSITIONER PATIENT SAFETY STRAP 3X60"

## (undated) DEVICE — GLV 8.5 PROTEXIS (WHITE)

## (undated) DEVICE — PACK MIS KNEE (1 PIECE)

## (undated) DEVICE — TUBING SUCTION 20FT

## (undated) DEVICE — WRAP COMPRESSION CALF MED

## (undated) DEVICE — DRSG AQUACEL 3.5 X 12"

## (undated) DEVICE — SUT VICRYL 1 27" CPX UNDYED

## (undated) DEVICE — BAG DECANTER IV STERILE

## (undated) DEVICE — NDL SPINAL 18G X 3.5"

## (undated) DEVICE — DRAPE SURGICAL #1010

## (undated) DEVICE — TUBING TUR 2 PRONG

## (undated) DEVICE — GLV 7.5 PROTEXIS (WHITE)

## (undated) DEVICE — GLV 8.5 PROTEXIS (BLUE)

## (undated) DEVICE — GLV 7.5 ESTEEM BLUE

## (undated) DEVICE — SOL BETADINE POUCH 0.75OZ STERILE

## (undated) DEVICE — SUT QUILL MONODERM 2-0 3/8 CIRCLE 45CM

## (undated) DEVICE — BOOT COVER NONSKID IMPERVIOUS

## (undated) DEVICE — MAKO DRAPE KIT

## (undated) DEVICE — MAKO BLADE NARROW

## (undated) DEVICE — GLV 7.5 PROTEXIS NEUTHERA

## (undated) DEVICE — SPONGE LAP X-RAY DETECTABLE 4X18"

## (undated) DEVICE — POSITIONER FOAM HEAD CRADLE

## (undated) DEVICE — SOL IRR BAG NS 0.9% 3000ML

## (undated) DEVICE — HOOD FLYTE STRYKER SURGICOOL W PEELAWAY

## (undated) DEVICE — SUT MONOSOF 4-0 18" C-13

## (undated) DEVICE — DVC SUCTION FLR PUDDLE GUPPY

## (undated) DEVICE — SAW BLADE STRYKER SAGITTAL DUAL CUT 64X35X.89MM

## (undated) DEVICE — SOLIDIFIER CANN EXPRESS 3K

## (undated) DEVICE — BLANKET WARMER UPPER ADULT

## (undated) DEVICE — DRSG DERMABOND 0.7ML

## (undated) DEVICE — POSITIONER FOAM EGG CRATE ULNAR 2PCS (PINK)

## (undated) DEVICE — CUFF TOURNIQUET 34" DUAL PORT W PLC

## (undated) DEVICE — PACK KNEE ARTHROSCOPY

## (undated) DEVICE — SUT MONOCRYL 2-0 27" SH UNDYED

## (undated) DEVICE — VENODYNE/SCD SLEEVE CALF LARGE

## (undated) DEVICE — DRAPE 3/4 SHEET 52X76"

## (undated) DEVICE — SOL IRR POUR H2O 250ML

## (undated) DEVICE — MAKO CHECKPOINT KIT FEMORAL / TIBIAL